# Patient Record
Sex: FEMALE | Race: WHITE | Employment: OTHER | URBAN - METROPOLITAN AREA
[De-identification: names, ages, dates, MRNs, and addresses within clinical notes are randomized per-mention and may not be internally consistent; named-entity substitution may affect disease eponyms.]

---

## 2017-01-12 ENCOUNTER — PATIENT OUTREACH (OUTPATIENT)
Dept: FAMILY MEDICINE CLINIC | Age: 67
End: 2017-01-12

## 2017-01-12 ENCOUNTER — OFFICE VISIT (OUTPATIENT)
Dept: ORTHOPEDIC SURGERY | Facility: CLINIC | Age: 67
End: 2017-01-12

## 2017-01-12 VITALS — DIASTOLIC BLOOD PRESSURE: 86 MMHG | SYSTOLIC BLOOD PRESSURE: 140 MMHG | HEART RATE: 70 BPM | TEMPERATURE: 98 F

## 2017-01-12 DIAGNOSIS — M25.512 LEFT SHOULDER PAIN, UNSPECIFIED CHRONICITY: ICD-10-CM

## 2017-01-12 DIAGNOSIS — M19.012 PRIMARY OSTEOARTHRITIS OF LEFT SHOULDER: ICD-10-CM

## 2017-01-12 DIAGNOSIS — M48.061 LUMBAR SPINAL STENOSIS: Primary | ICD-10-CM

## 2017-01-12 DIAGNOSIS — G89.29 CHRONIC BILATERAL LOW BACK PAIN WITHOUT SCIATICA: ICD-10-CM

## 2017-01-12 DIAGNOSIS — Z98.890 S/P ROTATOR CUFF REPAIR: ICD-10-CM

## 2017-01-12 DIAGNOSIS — M75.52 SHOULDER BURSITIS, LEFT: ICD-10-CM

## 2017-01-12 DIAGNOSIS — M25.552 LEFT HIP PAIN: ICD-10-CM

## 2017-01-12 DIAGNOSIS — Z98.1 S/P LUMBAR FUSION: ICD-10-CM

## 2017-01-12 DIAGNOSIS — M54.50 CHRONIC BILATERAL LOW BACK PAIN WITHOUT SCIATICA: ICD-10-CM

## 2017-01-12 RX ORDER — BUPIVACAINE HYDROCHLORIDE 2.5 MG/ML
4 INJECTION, SOLUTION EPIDURAL; INFILTRATION; INTRACAUDAL ONCE
Qty: 4 ML | Refills: 0
Start: 2017-01-12 | End: 2017-01-12

## 2017-01-12 RX ORDER — BETAMETHASONE SODIUM PHOSPHATE AND BETAMETHASONE ACETATE 3; 3 MG/ML; MG/ML
3 INJECTION, SUSPENSION INTRA-ARTICULAR; INTRALESIONAL; INTRAMUSCULAR; SOFT TISSUE ONCE
Qty: 0.5 ML | Refills: 0
Start: 2017-01-12 | End: 2017-01-12
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
## 2017-01-12 NOTE — PROGRESS NOTES
Patient call for status update. Mrs. Livia Carter states that she is still experiencing a lot of back and hip pain. She is scheduled to see an orthopedic physician today at 4:00 PM.  She is not yet able to exercise as she wishes and is using her walker to get around the house. Mrs. Livia Carter states that she would like to increase her mobility so that she can spend quality time with her grandchildren. The patient states that her blood sugars have been high over the past few weeks but are \"coming down\". She states her FBS this morning was 240 which she recognizes as still too high, but she feels that medications recently completed (prednisone) and her stress level due to chronic pain has kept them high. She states she is taking all of her medication as prescribed other than the Ellipta inhaler which insurance would not cover. Mrs. Livia Carter says that her breathing is fine. She is using her breathing medications and does not feel unusually short of breath. The patient asked about a referral for bariatric surgery that she discussed with her PCP at her visit on 12/30/2015. The referral is in place so I recommended she call the surgeon's office if she does not hear from them within another few days. She has had surgery with Dr. Natasha Morales in the past and I informed her that Dr. Natasha Morales does bariatric surgery so she will call his office to schedule an appointment. Mrs. Livia Carter agreed to another call in a few weeks for an update.

## 2017-01-12 NOTE — PATIENT INSTRUCTIONS
Learning About How to Have a Healthy Back  What causes back pain? Back pain is often caused by overuse, strain, or injury. For example, people often hurt their backs playing sports or working in the yard, being jolted in a car accident, or lifting something too heavy. Aging plays a part too. Your bones and muscles tend to lose strength as you age, which makes injury more likely. The spongy discs between the bones of the spine (vertebrae) may suffer from wear and tear and no longer provide enough cushion between the bones. A disc that bulges or breaks open (herniated disc) can press on nerves, causing back pain. In some people, back pain is the result of arthritis, broken vertebrae caused by bone loss (osteoporosis), illness, or a spine problem. Although most people have back pain at one time or another, there are steps you can take to make it less likely. How can you have a healthy back? Reduce stress on your back through good posture  Slumping or slouching alone may not cause low back pain. But after the back has been strained or injured, bad posture can make pain worse. · Sleep in a position that maintains your back's normal curves and on a mattress that feels comfortable. Sleep on your side with a pillow between your knees, or sleep on your back with a pillow under your knees. These positions can reduce strain on your back. · Stand and sit up straight. \"Good posture\" generally means your ears, shoulders, and hips are in a straight line. · If you must stand for a long time, put one foot on a stool, ledge, or box. Switch feet every now and then. · Sit in a chair that is low enough to let you place both feet flat on the floor with both knees nearly level with your hips. If your chair or desk is too high, use a footrest to raise your knees. Place a small pillow, a rolled-up towel, or a lumbar roll in the curve of your back if you need extra support.   · Try a kneeling chair, which helps tilt your hips forward. This takes pressure off your lower back. · Try sitting on an exercise ball. It can rock from side to side, which helps keep your back loose. · When driving, keep your knees nearly level with your hips. Sit straight, and drive with both hands on the steering wheel. Your arms should be in a slightly bent position. Reduce stress on your back through careful lifting  · Squat down, bending at the hips and knees only. If you need to, put one knee to the floor and extend your other knee in front of you, bent at a right angle (half kneeling). · Press your chest straight forward. This helps keep your upper back straight while keeping a slight arch in your low back. · Hold the load as close to your body as possible, at the level of your belly button (navel). · Use your feet to change direction, taking small steps. · Lead with your hips as you change direction. Keep your shoulders in line with your hips as you move. · Set down your load carefully, squatting with your knees and hips only. Exercise and stretch your back  · Do some exercise on most days of the week, if your doctor says it is okay. You can walk, run, swim, or cycle. · Stretch your back muscles. Here are a few exercises to try:  Josephus Phlegm on your back, and gently pull one bent knee to your chest. Put that foot back on the floor, and then pull the other knee to your chest.  ¨ Do pelvic tilts. Lie on your back with your knees bent. Tighten your stomach muscles. Pull your belly button (navel) in and up toward your ribs. You should feel like your back is pressing to the floor and your hips and pelvis are slightly lifting off the floor. Hold for 6 seconds while breathing smoothly. ¨ Sit with your back flat against a wall. · Keep your core muscles strong. The muscles of your back, belly (abdomen), and buttocks support your spine. ¨ Pull in your belly and imagine pulling your navel toward your spine. Hold this for 6 seconds, then relax.  Remember to keep breathing normally as you tense your muscles. ¨ Do curl-ups. Always do them with your knees bent. Keep your low back on the floor, and curl your shoulders toward your knees using a smooth, slow motion. Keep your arms folded across your chest. If this bothers your neck, try putting your hands behind your neck (not your head), with your elbows spread apart. ¨ Lie on your back with your knees bent and your feet flat on the floor. Tighten your belly muscles, and then push with your feet and raise your buttocks up a few inches. Hold this position 6 seconds as you continue to breathe normally, then lower yourself slowly to the floor. Repeat 8 to 12 times. ¨ If you like group exercise, try Pilates or yoga. These classes have poses that strengthen the core muscles. Lead a healthy lifestyle  · Stay at a healthy weight to avoid strain on your back. · Do not smoke. Smoking increases the risk of osteoporosis, which weakens the spine. If you need help quitting, talk to your doctor about stop-smoking programs and medicines. These can increase your chances of quitting for good. Where can you learn more? Go to http://flores-kendell.info/. Enter L315 in the search box to learn more about \"Learning About How to Have a Healthy Back. \"  Current as of: May 23, 2016  Content Version: 11.1  © 7129-3423 [employer]. Care instructions adapted under license by [employer] (which disclaims liability or warranty for this information). If you have questions about a medical condition or this instruction, always ask your healthcare professional. [place] [address] any warranty or liability for your use of this information.

## 2017-01-16 ENCOUNTER — OFFICE VISIT (OUTPATIENT)
Dept: ORTHOPEDIC SURGERY | Age: 67
End: 2017-01-16

## 2017-01-16 VITALS
OXYGEN SATURATION: 97 % | HEIGHT: 61 IN | HEART RATE: 88 BPM | SYSTOLIC BLOOD PRESSURE: 137 MMHG | RESPIRATION RATE: 16 BRPM | BODY MASS INDEX: 32.1 KG/M2 | DIASTOLIC BLOOD PRESSURE: 76 MMHG | TEMPERATURE: 97.7 F | WEIGHT: 170 LBS

## 2017-01-16 DIAGNOSIS — Z98.1 S/P LUMBAR FUSION: Primary | ICD-10-CM

## 2017-01-16 DIAGNOSIS — M48.061 LUMBAR SPINAL STENOSIS: ICD-10-CM

## 2017-01-16 RX ORDER — HYDROCODONE BITARTRATE AND ACETAMINOPHEN 5; 325 MG/1; MG/1
1 TABLET ORAL
Qty: 60 TAB | Refills: 0 | Status: SHIPPED | OUTPATIENT
Start: 2017-01-16 | End: 2017-01-30 | Stop reason: ALTCHOICE
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
## 2017-01-16 NOTE — PATIENT INSTRUCTIONS

## 2017-01-20 ENCOUNTER — HOSPITAL ENCOUNTER (OUTPATIENT)
Dept: MRI IMAGING | Age: 67
Discharge: HOME | End: 2017-01-20
Attending: PHYSICAL MEDICINE & REHABILITATION
Payer: MEDICARE

## 2017-01-20 ENCOUNTER — DOCUMENTATION ONLY (OUTPATIENT)
Dept: ORTHOPEDIC SURGERY | Age: 67
End: 2017-01-20

## 2017-01-20 VITALS — BODY MASS INDEX: 32.88 KG/M2 | WEIGHT: 174 LBS

## 2017-01-20 DIAGNOSIS — M48.061 LUMBAR SPINAL STENOSIS: ICD-10-CM

## 2017-01-20 DIAGNOSIS — Z98.1 S/P LUMBAR FUSION: ICD-10-CM

## 2017-01-20 PROCEDURE — 74011250636 HC RX REV CODE- 250/636: Performed by: PHYSICAL MEDICINE & REHABILITATION

## 2017-01-20 PROCEDURE — 72158 MRI LUMBAR SPINE W/O & W/DYE: CPT

## 2017-01-20 PROCEDURE — A9585 GADOBUTROL INJECTION: HCPCS | Performed by: PHYSICAL MEDICINE & REHABILITATION

## 2017-01-20 RX ADMIN — GADOBUTROL 8 ML: 604.72 INJECTION INTRAVENOUS at 09:04

## 2017-01-20 NOTE — PROGRESS NOTES
Why does pt have an appt with me and Dr Mary Mullins in the same month? She needs to be seen for an MRI f/u so that needs to be done with Dr Mary Mullins. If not needed. Cancel the appt with me.

## 2017-01-24 ENCOUNTER — DOCUMENTATION ONLY (OUTPATIENT)
Dept: ORTHOPEDIC SURGERY | Age: 67
End: 2017-01-24

## 2017-01-25 ENCOUNTER — OFFICE VISIT (OUTPATIENT)
Dept: ORTHOPEDIC SURGERY | Age: 67
End: 2017-01-25

## 2017-01-25 VITALS
RESPIRATION RATE: 18 BRPM | WEIGHT: 176 LBS | BODY MASS INDEX: 33.23 KG/M2 | HEART RATE: 83 BPM | SYSTOLIC BLOOD PRESSURE: 132 MMHG | HEIGHT: 61 IN | DIASTOLIC BLOOD PRESSURE: 80 MMHG | TEMPERATURE: 97.9 F | OXYGEN SATURATION: 97 %

## 2017-01-25 DIAGNOSIS — E11.43 TYPE 2 DIABETES MELLITUS WITH AUTONOMIC NEUROPATHY (HCC): ICD-10-CM

## 2017-01-25 DIAGNOSIS — M81.0 OSTEOPOROSIS: ICD-10-CM

## 2017-01-25 DIAGNOSIS — Z98.1 S/P LUMBAR FUSION: ICD-10-CM

## 2017-01-25 DIAGNOSIS — S32.000A LUMBAR COMPRESSION FRACTURE, CLOSED, INITIAL ENCOUNTER (HCC): Primary | ICD-10-CM

## 2017-01-25 RX ORDER — FENTANYL 12.5 UG/1
1 PATCH TRANSDERMAL
Qty: 10 PATCH | Refills: 0 | Status: SHIPPED | OUTPATIENT
Start: 2017-01-25 | End: 2017-01-30 | Stop reason: ALTCHOICE

## 2017-01-25 RX ORDER — FENTANYL 12.5 UG/1
1 PATCH TRANSDERMAL
Qty: 10 PATCH | Refills: 0 | Status: SHIPPED | OUTPATIENT
Start: 2017-02-24 | End: 2017-07-17 | Stop reason: ALTCHOICE

## 2017-01-25 RX ORDER — HYDROCODONE BITARTRATE AND ACETAMINOPHEN 5; 325 MG/1; MG/1
1 TABLET ORAL
Qty: 60 TAB | Refills: 0 | Status: CANCELLED | OUTPATIENT
Start: 2017-02-15

## 2017-01-25 RX ORDER — HYDROCODONE BITARTRATE AND ACETAMINOPHEN 10; 325 MG/1; MG/1
TABLET ORAL
COMMUNITY
Start: 2017-01-08 | End: 2017-01-30 | Stop reason: ALTCHOICE

## 2017-01-25 RX ORDER — METFORMIN HYDROCHLORIDE 1000 MG/1
TABLET ORAL
Qty: 180 TAB | Refills: 0 | Status: SHIPPED | OUTPATIENT
Start: 2017-01-25 | End: 2017-04-14 | Stop reason: SDUPTHER

## 2017-01-25 NOTE — PATIENT INSTRUCTIONS
Compression Fracture of the Spine: Care Instructions  Your Care Instructions    A compression fracture happens when the front part of a spinal bone breaks and collapses. A fall or other accident can cause it. A minor injury or moving the wrong way can cause a break if you have thin or brittle bones (osteoporosis). These types of breaks will heal in 8 to 10 weeks. You will need rest and pain medicines. Your doctor may recommend physical therapy. Some doctors recommend that certain people with compression fractures wear braces. Your doctor also may treat thin or brittle bones. You may need surgery if you have lasting pain or if the bone presses on the spinal cord or nerves. You heal best when you take good care of yourself. Eat a variety of healthy foods, and don't smoke. Follow-up care is a key part of your treatment and safety. Be sure to make and go to all appointments, and call your doctor if you are having problems. It's also a good idea to know your test results and keep a list of the medicines you take. How can you care for yourself at home? · Be safe with medicines. Read and follow all instructions on the label. ¨ If the doctor gave you a prescription medicine for pain, take it as prescribed. ¨ If you are not taking a prescription pain medicine, ask your doctor if you can take an over-the-counter medicine. · Talk to your doctor about how to make your bones stronger. You may need medicines or a change in what you eat. · Be active only as directed by your doctor. When should you call for help? Call 911 anytime you think you may need emergency care. For example, call if:  · You are unable to move an arm or a leg at all. Call your doctor now or seek immediate medical care if:  · You have new or worse symptoms in your arms, chest, legs, belly, or buttocks. Symptoms may include:  ¨ Numbness or tingling. ¨ Weakness. ¨ Pain. · You lose bladder or bowel control.   · You have belly pain, bloating, vomiting, or nausea. Watch closely for changes in your health, and be sure to contact your doctor if:  · You are not getting better as expected. Where can you learn more? Go to http://flores-kendell.info/. Enter P445 in the search box to learn more about \"Compression Fracture of the Spine: Care Instructions. \"  Current as of: August 10, 2016  Content Version: 11.1  © 3827-2918 [employer]. Care instructions adapted under license by [employer] (which disclaims liability or warranty for this information). If you have questions about a medical condition or this instruction, always ask your healthcare professional. [address] any warranty or liability for your use of this information.
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
## 2017-01-25 NOTE — PROGRESS NOTES
WILBUR ENTERED PER DR. To Concepcion AS DOCUMENTED ON BLUE SHEET: REFERRAL TO ENDOCRINOLOGY FOR DR. GRAJEDA FOR EVAL FOR OSTEOPOROSIS, HX OF BREAST CANCER. FAXED [phone]. FAX CONFIRMATION RECEIVED.

## 2017-01-30 ENCOUNTER — PATIENT OUTREACH (OUTPATIENT)
Dept: FAMILY MEDICINE CLINIC | Age: 67
End: 2017-01-30

## 2017-01-30 ENCOUNTER — DOCUMENTATION ONLY (OUTPATIENT)
Dept: ORTHOPEDIC SURGERY | Age: 67
End: 2017-01-30

## 2017-01-30 ENCOUNTER — OFFICE VISIT (OUTPATIENT)
Dept: FAMILY MEDICINE CLINIC | Age: 67
End: 2017-01-30

## 2017-01-30 VITALS
SYSTOLIC BLOOD PRESSURE: 106 MMHG | DIASTOLIC BLOOD PRESSURE: 64 MMHG | HEART RATE: 84 BPM | RESPIRATION RATE: 20 BRPM | HEIGHT: 61 IN | TEMPERATURE: 98 F | OXYGEN SATURATION: 99 % | WEIGHT: 180.4 LBS | BODY MASS INDEX: 34.06 KG/M2

## 2017-01-30 DIAGNOSIS — Z79.4 TYPE 2 DIABETES MELLITUS WITH DIABETIC AUTONOMIC NEUROPATHY, WITH LONG-TERM CURRENT USE OF INSULIN (HCC): Primary | ICD-10-CM

## 2017-01-30 DIAGNOSIS — J40 BRONCHITIS: ICD-10-CM

## 2017-01-30 DIAGNOSIS — E11.43 TYPE 2 DIABETES MELLITUS WITH DIABETIC AUTONOMIC NEUROPATHY, WITH LONG-TERM CURRENT USE OF INSULIN (HCC): Primary | ICD-10-CM

## 2017-01-30 DIAGNOSIS — M81.0 OSTEOPOROSIS: ICD-10-CM

## 2017-01-30 DIAGNOSIS — S32.020B: ICD-10-CM

## 2017-01-30 RX ORDER — LIDOCAINE 50 MG/G
PATCH TOPICAL
Qty: 30 EACH | Refills: 0 | Status: ON HOLD | OUTPATIENT
Start: 2017-01-30 | End: 2017-02-02

## 2017-01-30 RX ORDER — AZITHROMYCIN 250 MG/1
TABLET, FILM COATED ORAL
Qty: 6 TAB | Refills: 0 | Status: SHIPPED | OUTPATIENT
Start: 2017-01-30 | End: 2017-02-04

## 2017-01-30 RX ORDER — ALENDRONATE SODIUM 70 MG/1
70 TABLET ORAL
Qty: 30 TAB | Refills: 0 | Status: SHIPPED | OUTPATIENT
Start: 2017-01-30 | End: 2017-11-13 | Stop reason: SDUPTHER

## 2017-01-30 NOTE — PROGRESS NOTES
Nolan Graff is a 73 y.o. female  Chief Complaint   Patient presents with    COPD     1 month follow up     1. Have you been to the ER, urgent care clinic since your last visit? Hospitalized since your last visit? No    2. Have you seen or consulted any other health care providers outside of the [facility] since your last visit? Include any pap smears or colon screening.  No

## 2017-01-30 NOTE — MR AVS SNAPSHOT
Visit Information Date & Time Provider Department Dept. Phone Encounter #  
 1/30/2017  7:45 AM Kameron Lynn, [facility] [phone] [id number] Your Appointments 3/1/2017  1:50 PM  
Follow Up with Justin Downs MD  
[facility] ([facility]) Appt Note: 1 month back fu  no copay [address] [facility] [id number] [phone]  
  
   
 [address] [facility] [address] Upcoming Health Maintenance Date Due  
 EYE EXAM RETINAL OR DILATED Q1 4/5/2015 GLAUCOMA SCREENING Q2Y 4/7/2016 FOOT EXAM Q1 4/18/2017 MICROALBUMIN Q1 4/18/2017 LIPID PANEL Q1 4/18/2017 HEMOGLOBIN A1C Q6M 6/30/2017 MEDICARE YEARLY EXAM 7/27/2017 BREAST CANCER SCRN MAMMOGRAM 8/11/2018 COLONOSCOPY 10/6/2019 DTaP/Tdap/Td series (2 - Td) 7/26/2026 Allergies as of 1/30/2017  Review Complete On: 1/30/2017 By: Kameron Lynn MD  
  
 Severity Noted Reaction Type Reactions Percocet [Oxycodone-acetaminophen]  10/08/2010    Rash, Itching Can Take Percocet but must take Benadryl for itching Perfume Ht52  02/05/2015    Rash Perfume specific:  MUSK Current Immunizations  Reviewed on 4/18/2016 Name Date Influenza High Dose Vaccine PF 10/19/2016  9:33 AM  
 Influenza Vaccine 10/19/2015  8:47 AM, 10/17/2013 Influenza Vaccine Whole 12/1/2008 Pneumococcal Polysaccharide (PPSV-23) 4/18/2016  1:50 PM  
 Pneumococcal Vaccine (Unspecified Type) 12/1/2008 Not reviewed this visit You Were Diagnosed With   
  
 Codes Comments Type 2 diabetes mellitus with diabetic autonomic neuropathy, with long-term current use of insulin (HCC)    -  Primary ICD-10-CM: E11.43, Z79.4 ICD-9-CM: 250.60, 337.1, V58.67 Compression fracture of L2, open, [facility] encounter [facility]     ICD-10-CM: S32.020B ICD-9-CM: 805.5 Osteoporosis     ICD-10-CM: M81.0 ICD-9-CM: 733.00   
 Bronchitis     ICD-10-CM: J40 ICD-9-CM: 084 Vitals BP Pulse Temp Resp Height(growth percentile) Weight(growth percentile) 106/64 (BP 1 Location: Right arm, BP Patient Position: Sitting) 84 98 °F (36.7 °C) 20 5' 1\" (1.549 m) 180 lb 6.4 oz (81.8 kg) SpO2 BMI OB Status Smoking Status 99% 34.09 kg/m2 Hysterectomy Current Every Day Smoker BMI and BSA Data Body Mass Index Body Surface Area 34.09 kg/m 2 1.88 m 2 Preferred Pharmacy Pharmacy Name Phone [facility] [address] [phone] Your Updated Medication List  
  
   
This list is accurate as of: 1/30/17  8:06 AM.  Always use your most recent med list.  
  
  
  
  
 albuterol 90 mcg/actuation inhaler Commonly known as:  PROVENTIL HFA, VENTOLIN HFA, PROAIR HFA Take 2 Puffs by inhalation every four (4) hours as needed for Wheezing. Indications: Chronic Obstructive Pulmonary Disease  
  
 alendronate 70 mg tablet Commonly known as:  FOSAMAX Take 1 Tab by mouth every seven (7) days. aspirin 81 mg chewable tablet Take 1 Tab by mouth daily. azithromycin 250 mg tablet Commonly known as:  Steffanie Brain Take 2 tablets today, then take 1 tablet daily CALCIUM-MAGNESIUM-ZINC PO Take  by mouth daily. fentaNYL 12 mcg/hr patch Commonly known as:  DURAGESIC  
1 Patch by TransDERmal route every seventy-two (72) hours. Max Daily Amount: 1 Patch. Start taking on:  2/24/2017  
  
 glipiZIDE 10 mg tablet Commonly known as:  Tamiko Limb Take 1 Tab by mouth two (2) times a day. insulin regular 100 unit/mL injection Commonly known as:  NOVOLIN R, HUMULIN R  
15 Units by SubCUTAneous route three (3) times daily. insulin syringe-needle U-100 Dispense ultrafine 0.5 mL syringes with 31 gauge needle  
  
 ipratropium 0.02 % nebulizer solution Commonly known as:  ATROVENT  
 2.5 mL by Nebulization route every four (4) hours (while awake). Indications: PREVENTION OF BRONCHOSPASM WITH CHRONIC BRONCHITIS Iron 325 mg (65 mg iron) tablet Generic drug:  ferrous sulfate Take  by mouth Daily (before breakfast). lidocaine 5 % Commonly known as:  Sheran Alert Apply patch to the affected area for 12 hours a day and remove for 12 hours a day. lisinopril-hydroCHLOROthiazide 20-25 mg per tablet Commonly known as:  Warner Spark Take 1 Tab by mouth daily. metFORMIN 1,000 mg tablet Commonly known as:  GLUCOPHAGE  
TAKE ONE TABLET BY MOUTH TWICE DAILY WITH FOOD  
  
 pravastatin 40 mg tablet Commonly known as:  PRAVACHOL Take 1 Tab by mouth daily. umeclidinium 62.5 mcg/actuation inhaler Commonly known as:  INCRUSE ELLIPTA Take 1 Puff by inhalation daily. Prescriptions Sent to Pharmacy Refills  
 alendronate (FOSAMAX) 70 mg tablet 0 Sig: Take 1 Tab by mouth every seven (7) days. Class: Normal  
 Pharmacy: [facility] [address] [place] [address] Ph #: [phone] Route: Oral  
 lidocaine (LIDODERM) 5 % 0 Sig: Apply patch to the affected area for 12 hours a day and remove for 12 hours a day. Class: Normal  
 Pharmacy: [facility] [address] [place] [address]  Ph #: [phone]  
 azithromycin (ZITHROMAX) 250 mg tablet 0 Sig: Take 2 tablets today, then take 1 tablet daily Class: Normal  
 Pharmacy: [facility] [address] Ph #: [phone] We Performed the Following REFERRAL TO INTERVENTIONAL RADIOLOGY [MIA36 Custom] Comments:  
 Please evaluate patient for L2 compression fracture. eval for kyphoplasty. REFERRAL TO OPHTHALMOLOGY [REF57 Custom] Comments:  
 Please evaluate patient for diabetic eye exam.  Dr. Mortimer Grade eye Referral Information Referral ID Referred By Referred To [id number] 104 Janie Lunsford Not Available Visits Status Start Date End Date 1 New Request 1/30/17 1/30/18 If your referral has a status of pending review or denied, additional information will be sent to support the outcome of this decision. Referral ID Referred By Referred To  
 [id number] Ronnie Gifford V Not Available Visits Status Start Date End Date 1 New Request 1/30/17 1/30/18 If your referral has a status of pending review or denied, additional information will be sent to support the outcome of this decision. Patient Instructions Compression Fracture of the Spine: Care Instructions Your Care Instructions A compression fracture happens when the front part of a spinal bone breaks and collapses. A fall or other accident can cause it. A minor injury or moving the wrong way can cause a break if you have thin or brittle bones (osteoporosis). These types of breaks will heal in 8 to 10 weeks. You will need rest and pain medicines. Your doctor may recommend physical therapy. Some doctors recommend that certain people with compression fractures wear braces. Your doctor also may treat thin or brittle bones. You may need surgery if you have lasting pain or if the bone presses on the spinal cord or nerves. You heal best when you take good care of yourself. Eat a variety of healthy foods, and don't smoke. Follow-up care is a key part of your treatment and safety. Be sure to make and go to all appointments, and call your doctor if you are having problems. It's also a good idea to know your test results and keep a list of the medicines you take. How can you care for yourself at home? · Be safe with medicines. Read and follow all instructions on the label. ¨ If the doctor gave you a prescription medicine for pain, take it as prescribed. ¨ If you are not taking a prescription pain medicine, ask your doctor if you can take an over-the-counter medicine. · Talk to your doctor about how to make your bones stronger. You may need medicines or a change in what you eat. · Be active only as directed by your doctor. When should you call for help? Call 911 anytime you think you may need emergency care. For example, call if: 
· You are unable to move an arm or a leg at all. Call your doctor now or seek immediate medical care if: 
· You have new or worse symptoms in your arms, chest, legs, belly, or buttocks. Symptoms may include: ¨ Numbness or tingling. ¨ Weakness. ¨ Pain. · You lose bladder or bowel control. · You have belly pain, bloating, vomiting, or nausea. Watch closely for changes in your health, and be sure to contact your doctor if: 
· You are not getting better as expected. Where can you learn more? Go to http://flores-kendell.info/. Enter P445 in the search box to learn more about \"Compression Fracture of the Spine: Care Instructions. \" Current as of: August 10, 2016 Content Version: 11.1 © 9255-4422 [employer]. Care instructions adapted under license by [employer] (which disclaims liability or warranty for this information). If you have questions about a medical condition or this instruction, always ask your healthcare professional. [address] any warranty or liability for your use of this information. Introducing [facility]! Dear Lazaro Alcala: 
Thank you for requesting a [employer] account. Our records indicate that you already have an active [employer] account. You can access your account anytime at [email or web address] Did you know that you can access your hospital and ER discharge instructions at any time in [employer]? You can also review all of your test results from your hospital stay or ER visit. Additional Information If you have questions, please visit the Frequently Asked Questions section of the [employer] website at [email or web address] [email or web address]. [email or web address] Remember, [employer] is NOT to be used for urgent needs. For medical emergencies, dial 911. Now available from your iPhone and Android! Please provide this summary of care documentation to your next provider. Your primary care clinician is listed as Norma Emanuel. If you have any questions after today's visit, please call [phone].

## 2017-01-30 NOTE — PROGRESS NOTES
Assessment/Plan:    1. Type 2 diabetes mellitus with diabetic autonomic neuropathy, with long-term current use of insulin ([facility]  -pt didn't bring bs log. Discussed I couldn't titrate meds b/c she hasn't brought bs log. F/u in 1 mo with bs log.  - REFERRAL TO OPHTHALMOLOGY    2. Compression fracture of L2, open, initial encounter ([facility]- will refer to IR for kyphoplasty eval.  - REFERRAL TO INTERVENTIONAL RADIOLOGY  - lidocaine (LIDODERM) 5 %; Apply patch to the affected area for 12 hours a day and remove for 12 hours a day. Dispense: 30 Each; Refill: 0    3. Osteoporosis  -start fosamax. Has referral to endo pending.  - alendronate (FOSAMAX) 70 mg tablet; Take 1 Tab by mouth every seven (7) days. Dispense: 30 Tab; Refill: 0    4. Bronchitis  -pt to get incruse that was previously prescribed. Stop smoking. zpack. - azithromycin (ZITHROMAX) 250 mg tablet; Take 2 tablets today, then take 1 tablet daily  Dispense: 6 Tab; Refill: 0    The plan was discussed with the patient. The patient verbalized understanding and is in agreement with the plan. All medication potential side effects were discussed with the patient.     Health Maintenance:   Health Maintenance   Topic Date Due    EYE EXAM RETINAL OR DILATED Q1  04/05/2015    GLAUCOMA SCREENING Q2Y  04/07/2016    FOOT EXAM Q1  04/18/2017    MICROALBUMIN Q1  04/18/2017    LIPID PANEL Q1  04/18/2017    HEMOGLOBIN A1C Q6M  06/30/2017    MEDICARE YEARLY EXAM  07/27/2017    BREAST CANCER SCRN MAMMOGRAM  08/11/2018    COLONOSCOPY  10/06/2019    DTaP/Tdap/Td series (2 - Td) 07/26/2026    Hepatitis C Screening  Completed    OSTEOPOROSIS SCREENING (DEXA)  Completed    ZOSTER VACCINE AGE 60>  Addressed    Pneumococcal 65+ Low/Medium Risk  Completed    INFLUENZA AGE 9 TO ADULT  Addressed       Iggy Gar is a 73 y.o. female and presents with COPD (1 month follow up)     Subjective:  DM2 - on metformin 1000 units bid, glipizide and regular insulin 15 units TID.  A1c last month was 10.3. She is not checking her bs as requested and didn't bring in her bs log. Hasn't had eye exam.    Notes worsening back pain since 12/2016. MRI this month showed L2 compression fracture. She has known osteoporosis from dexa 9/2016, but never responded to request to start fosamax. She is already taking calcium + D supplement. Pain management started her on a norco pain patch for this. Was treated for bronchitis last month with augmentin and prednisone. States she's still coughing stuff up. She was prescribed spiriva, but insurance didn't cover it so it was changed to incruze. Which she hasn't started. ROS:  Constitutional: No recent weight change. No weakness/fatigue. No f/c. Cardiovascular: No CP/palpitations. No NINA/orthopnea/PND. Respiratory: No cough/sputum, dyspnea, wheezing. Gastointestinal: No dysphagia, reflux. No n/v. No constipation/diarrhea. No melena/rectal bleeding. Neurological: No seizures/numbness/weakness. No paresthesias. The problem list was updated as a part of today's visit.   Patient Active Problem List   Diagnosis Code    Gastroparesis K31.84    Vitamin D deficiency E55.9    Hx of breast cancer Z85.3    Chronic pain syndrome G89.4    Osteoarthrosis, unspecified whether generalized or localized, unspecified site M19.90    Hepatomegaly R83.5    Diastolic congestive heart failure (HCC) I50.30    COPD (chronic obstructive pulmonary disease) (HCC) J44.9    GERD (gastroesophageal reflux disease) K21.9    Rectal bleeding K62.5    Lumbar radiculopathy M54.16    Tobacco dependence F17.200    Chronic radicular lumbar pain M54.16, G89.29    Scoliosis M41.9    Essential hypertension I10    Pure hypercholesterolemia E78.00    Type II diabetes mellitus (HCC) E11.9    CAD (coronary artery disease) I25.10    Spinal stenosis of lumbar region with neurogenic claudication M48.06    Lumbar spinal stenosis M48.06    S/P lumbar fusion Z98.1    Osteoporosis M81.0    Chronic bilateral low back pain without sciatica M54.5, G89.29    Left hip pain M25.552    Left upper arm pain M79.622    Lumbar compression fracture (HCC) S32.000A       The PSH,  were reviewed. SH:  Social History   Substance Use Topics    Smoking status: Current Every Day Smoker     Packs/day: 0.50     Years: 35.00     Types: Cigarettes    Smokeless tobacco: Never Used    Alcohol use No       Medications/Allergies:  Current Outpatient Prescriptions on File Prior to Visit   Medication Sig Dispense Refill    metFORMIN (GLUCOPHAGE) 1,000 mg tablet TAKE ONE TABLET BY MOUTH TWICE DAILY WITH FOOD 180 Tab 0    [START ON 2/24/2017] fentaNYL (DURAGESIC) 12 mcg/hr patch 1 Patch by TransDERmal route every seventy-two (72) hours. Max Daily Amount: 1 Patch. 10 Patch 0    insulin regular (NOVOLIN R, HUMULIN R) 100 unit/mL injection 15 Units by SubCUTAneous route three (3) times daily. 4 Vial 0    ipratropium (ATROVENT) 0.02 % nebulizer solution 2.5 mL by Nebulization route every four (4) hours (while awake). Indications: PREVENTION OF BRONCHOSPASM WITH CHRONIC BRONCHITIS 60 mL 1    albuterol (PROVENTIL HFA, VENTOLIN HFA, PROAIR HFA) 90 mcg/actuation inhaler Take 2 Puffs by inhalation every four (4) hours as needed for Wheezing. Indications: Chronic Obstructive Pulmonary Disease 1 Inhaler 1    umeclidinium (INCRUSE ELLIPTA) 62.5 mcg/actuation inhaler Take 1 Puff by inhalation daily. 1 Inhaler 1    lisinopril-hydrochlorothiazide (PRINZIDE, ZESTORETIC) 20-25 mg per tablet Take 1 Tab by mouth daily. 90 Tab 1    pravastatin (PRAVACHOL) 40 mg tablet Take 1 Tab by mouth daily. 90 Tab 1    glipiZIDE (GLUCOTROL) 10 mg tablet Take 1 Tab by mouth two (2) times a day. 180 Tab 0    insulin syringe-needle U-100 Dispense ultrafine 0.5 mL syringes with 31 gauge needle 100 Syringe 11    CALCIUM-MAGNESIUM-ZINC PO Take  by mouth daily.       ferrous sulfate (IRON) 325 mg (65 mg iron) tablet Take  by mouth Daily (before breakfast).  aspirin 81 mg chewable tablet Take 1 Tab by mouth daily.  HYDROcodone-acetaminophen (NORCO)  mg tablet       fentaNYL (DURAGESIC) 12 mcg/hr patch 1 Patch by TransDERmal route every seventy-two (72) hours. Max Daily Amount: 1 Patch. 10 Patch 0    HYDROcodone-acetaminophen (NORCO) 5-325 mg per tablet Take 1 Tab by mouth two (2) times daily as needed for Pain. Max Daily Amount: 2 Tabs. 60 Tab 0    clotrimazole (LOTRIMIN) 1 % topical cream Apply  to affected area two (2) times a day. 15 g 0     No current facility-administered medications on file prior to visit. Allergies   Allergen Reactions    Percocet [Oxycodone-Acetaminophen] Rash and Itching     Can Take Percocet but must take Benadryl for itching     Perfume Ht52 Rash     Perfume specific:  MUSK       Objective:  Visit Vitals    /64 (BP 1 Location: Right arm, BP Patient Position: Sitting)    Pulse 84    Temp 98 °F (36.7 °C)    Resp 20    Ht 5' 1\" (1.549 m)    Wt 180 lb 6.4 oz (81.8 kg)    SpO2 99%    BMI 34.09 kg/m2      Constitutional: Well developed, nourished, no distress, alert, obese habitus   CV: S1, S2.  RRR. No murmurs/rubs. No thrills palpated. No carotid bruits. Intact distal pulses. No edema. Pulm: No abnormalities on inspection. Clear to auscultation bilaterally. No wheezing/rhonchi. Normal effort. GI: Soft, nontender, nondistended. Normal active bowel sounds. Neuro: A/O x 3. No focal motor or sensory deficits. Speech normal.   Psych: Appropriate affect, judgement and insight. Short-term memory intact.        Labwork and Ancillary Studies:    CBC w/Diff  Lab Results   Component Value Date/Time    WBC 9.7 12/27/2016 08:24 AM    HGB 11.7 12/27/2016 08:24 AM    PLATELET 089 51/01/8053 08:24 AM         Basic Metabolic Profile/LFTs  Lab Results   Component Value Date/Time    Sodium 134 12/27/2016 08:24 AM    Potassium 4.4 12/27/2016 08:24 AM    Chloride 101 12/27/2016 08:24 AM    CO2 26 12/27/2016 08:24 AM    Anion gap 12 06/01/2016 11:05 AM    Glucose 264 12/27/2016 08:24 AM    BUN 28 12/27/2016 08:24 AM    Creatinine 1.1 12/27/2016 08:24 AM    BUN/Creatinine ratio 23 06/01/2016 11:05 AM    GFR est AA >60.0 12/27/2016 08:24 AM    GFR est non-AA 53 12/27/2016 08:24 AM    Calcium 8.8 12/27/2016 08:24 AM      Lab Results   Component Value Date/Time    ALT 22 12/27/2016 08:24 AM    AST 14 12/27/2016 08:24 AM    Alk.  phosphatase 138 12/27/2016 08:24 AM    Bilirubin, direct <0.1 04/28/2016 03:42 AM    Bilirubin, total 0.3 12/27/2016 08:24 AM       Cholesterol  Lab Results   Component Value Date/Time    Cholesterol, total 179 04/18/2016 02:02 PM    HDL Cholesterol 43 04/18/2016 02:02 PM    LDL, calculated 102.2 04/18/2016 02:02 PM    Triglyceride 169 04/18/2016 02:02 PM    CHOL/HDL Ratio 4.2 04/18/2016 02:02 PM

## 2017-01-30 NOTE — PROGRESS NOTES
Patient has completed 30 days since [facility] ED visit on 12/27/16 for c/o left flank pain. Ms. Mira Washington has attended visits with both orthopedics and PCP in the past 30 days. This episode is closed.

## 2017-01-30 NOTE — PATIENT INSTRUCTIONS
Compression Fracture of the Spine: Care Instructions  Your Care Instructions    A compression fracture happens when the front part of a spinal bone breaks and collapses. A fall or other accident can cause it. A minor injury or moving the wrong way can cause a break if you have thin or brittle bones (osteoporosis). These types of breaks will heal in 8 to 10 weeks. You will need rest and pain medicines. Your doctor may recommend physical therapy. Some doctors recommend that certain people with compression fractures wear braces. Your doctor also may treat thin or brittle bones. You may need surgery if you have lasting pain or if the bone presses on the spinal cord or nerves. You heal best when you take good care of yourself. Eat a variety of healthy foods, and don't smoke. Follow-up care is a key part of your treatment and safety. Be sure to make and go to all appointments, and call your doctor if you are having problems. It's also a good idea to know your test results and keep a list of the medicines you take. How can you care for yourself at home? · Be safe with medicines. Read and follow all instructions on the label. ¨ If the doctor gave you a prescription medicine for pain, take it as prescribed. ¨ If you are not taking a prescription pain medicine, ask your doctor if you can take an over-the-counter medicine. · Talk to your doctor about how to make your bones stronger. You may need medicines or a change in what you eat. · Be active only as directed by your doctor. When should you call for help? Call 911 anytime you think you may need emergency care. For example, call if:  · You are unable to move an arm or a leg at all. Call your doctor now or seek immediate medical care if:  · You have new or worse symptoms in your arms, chest, legs, belly, or buttocks. Symptoms may include:  ¨ Numbness or tingling. ¨ Weakness. ¨ Pain. · You lose bladder or bowel control.   · You have belly pain, bloating, vomiting, or nausea. Watch closely for changes in your health, and be sure to contact your doctor if:  · You are not getting better as expected. Where can you learn more? Go to http://flores-kendell.info/. Enter P445 in the search box to learn more about \"Compression Fracture of the Spine: Care Instructions. \"  Current as of: August 10, 2016  Content Version: 11.1  © 6893-7736 [employer]. Care instructions adapted under license by [employer] (which disclaims liability or warranty for this information). If you have questions about a medical condition or this instruction, always ask your healthcare professional. [address] any warranty or liability for your use of this information.

## 2017-02-02 ENCOUNTER — HOSPITAL ENCOUNTER (OUTPATIENT)
Dept: CARDIAC CATH/INVASIVE PROCEDURES | Age: 67
Discharge: HOME | End: 2017-02-02
Attending: RADIOLOGY | Admitting: RADIOLOGY
Payer: MEDICARE

## 2017-02-02 VITALS
BODY MASS INDEX: 33.7 KG/M2 | TEMPERATURE: 97.8 F | DIASTOLIC BLOOD PRESSURE: 83 MMHG | OXYGEN SATURATION: 94 % | WEIGHT: 178.5 LBS | HEIGHT: 61 IN | RESPIRATION RATE: 16 BRPM | SYSTOLIC BLOOD PRESSURE: 135 MMHG | HEART RATE: 100 BPM

## 2017-02-02 LAB
ANION GAP BLD CALC-SCNC: 10 MMOL/L (ref 3–18)
BUN SERPL-MCNC: 22 MG/DL (ref 7–18)
BUN/CREAT SERPL: 22 (ref 12–20)
CALCIUM SERPL-MCNC: 8.7 MG/DL (ref 8.5–10.1)
CHLORIDE SERPL-SCNC: 103 MMOL/L (ref 100–108)
CO2 SERPL-SCNC: 24 MMOL/L (ref 21–32)
CREAT SERPL-MCNC: 1 MG/DL (ref 0.6–1.3)
ERYTHROCYTE [DISTWIDTH] IN BLOOD BY AUTOMATED COUNT: 14.8 % (ref 11.6–14.5)
GLUCOSE BLD STRIP.AUTO-MCNC: 123 MG/DL (ref 70–110)
GLUCOSE BLD STRIP.AUTO-MCNC: 154 MG/DL (ref 70–110)
GLUCOSE SERPL-MCNC: 129 MG/DL (ref 74–99)
HCT VFR BLD AUTO: 32.3 % (ref 35–45)
HGB BLD-MCNC: 10.9 G/DL (ref 12–16)
INR PPP: 0.9 (ref 0.8–1.2)
MCH RBC QN AUTO: 29.3 PG (ref 24–34)
MCHC RBC AUTO-ENTMCNC: 33.7 G/DL (ref 31–37)
MCV RBC AUTO: 86.8 FL (ref 74–97)
PLATELET # BLD AUTO: 235 K/UL (ref 135–420)
PMV BLD AUTO: 8.7 FL (ref 9.2–11.8)
POTASSIUM SERPL-SCNC: 4.4 MMOL/L (ref 3.5–5.5)
PROTHROMBIN TIME: 12.3 SEC (ref 11.5–15.2)
RBC # BLD AUTO: 3.72 M/UL (ref 4.2–5.3)
SODIUM SERPL-SCNC: 137 MMOL/L (ref 136–145)
WBC # BLD AUTO: 8.6 K/UL (ref 4.6–13.2)

## 2017-02-02 PROCEDURE — 22514 PERQ VERTEBRAL AUGMENTATION: CPT

## 2017-02-02 PROCEDURE — 74011000250 HC RX REV CODE- 250: Performed by: RADIOLOGY

## 2017-02-02 PROCEDURE — 85027 COMPLETE CBC AUTOMATED: CPT | Performed by: RADIOLOGY

## 2017-02-02 PROCEDURE — 77030029562 MISC ANGIO PROCEDURE

## 2017-02-02 PROCEDURE — 80048 BASIC METABOLIC PNL TOTAL CA: CPT | Performed by: RADIOLOGY

## 2017-02-02 PROCEDURE — 85610 PROTHROMBIN TIME: CPT | Performed by: RADIOLOGY

## 2017-02-02 PROCEDURE — 74011250636 HC RX REV CODE- 250/636: Performed by: RADIOLOGY

## 2017-02-02 PROCEDURE — 82962 GLUCOSE BLOOD TEST: CPT

## 2017-02-02 PROCEDURE — 74011250637 HC RX REV CODE- 250/637: Performed by: RADIOLOGY

## 2017-02-02 PROCEDURE — 74011636320 HC RX REV CODE- 636/320: Performed by: RADIOLOGY

## 2017-02-02 RX ORDER — SODIUM CHLORIDE 0.9 % (FLUSH) 0.9 %
5-10 SYRINGE (ML) INJECTION EVERY 8 HOURS
Status: DISCONTINUED | OUTPATIENT
Start: 2017-02-02 | End: 2017-02-02 | Stop reason: HOSPADM

## 2017-02-02 RX ORDER — BUPIVACAINE HYDROCHLORIDE 2.5 MG/ML
1-20 INJECTION, SOLUTION EPIDURAL; INFILTRATION; INTRACAUDAL
Status: DISCONTINUED | OUTPATIENT
Start: 2017-02-02 | End: 2017-02-02 | Stop reason: HOSPADM

## 2017-02-02 RX ORDER — FENTANYL CITRATE 50 UG/ML
25-100 INJECTION, SOLUTION INTRAMUSCULAR; INTRAVENOUS
Status: DISCONTINUED | OUTPATIENT
Start: 2017-02-02 | End: 2017-02-02 | Stop reason: HOSPADM

## 2017-02-02 RX ORDER — SODIUM CHLORIDE 0.9 % (FLUSH) 0.9 %
5-10 SYRINGE (ML) INJECTION AS NEEDED
Status: DISCONTINUED | OUTPATIENT
Start: 2017-02-02 | End: 2017-02-02 | Stop reason: HOSPADM

## 2017-02-02 RX ORDER — HYDROCODONE BITARTRATE AND ACETAMINOPHEN 5; 325 MG/1; MG/1
1 TABLET ORAL
Status: DISCONTINUED | OUTPATIENT
Start: 2017-02-02 | End: 2017-02-02 | Stop reason: HOSPADM

## 2017-02-02 RX ORDER — MIDAZOLAM HYDROCHLORIDE 1 MG/ML
.5-2 INJECTION, SOLUTION INTRAMUSCULAR; INTRAVENOUS
Status: DISCONTINUED | OUTPATIENT
Start: 2017-02-02 | End: 2017-02-02 | Stop reason: HOSPADM

## 2017-02-02 RX ORDER — SODIUM CHLORIDE 9 MG/ML
50 INJECTION, SOLUTION INTRAVENOUS CONTINUOUS
Status: DISCONTINUED | OUTPATIENT
Start: 2017-02-02 | End: 2017-02-02 | Stop reason: HOSPADM

## 2017-02-02 RX ORDER — ACETAMINOPHEN 325 MG/1
650 TABLET ORAL
Status: DISCONTINUED | OUTPATIENT
Start: 2017-02-02 | End: 2017-02-02 | Stop reason: HOSPADM

## 2017-02-02 RX ORDER — CEFAZOLIN SODIUM 2 G/50ML
2 SOLUTION INTRAVENOUS ONCE
Status: COMPLETED | OUTPATIENT
Start: 2017-02-02 | End: 2017-02-02

## 2017-02-02 RX ADMIN — MIDAZOLAM HYDROCHLORIDE 1 MG: 1 INJECTION, SOLUTION INTRAMUSCULAR; INTRAVENOUS at 09:59

## 2017-02-02 RX ADMIN — FENTANYL CITRATE 25 MCG: 50 INJECTION, SOLUTION INTRAMUSCULAR; INTRAVENOUS at 10:24

## 2017-02-02 RX ADMIN — FENTANYL CITRATE 25 MCG: 50 INJECTION, SOLUTION INTRAMUSCULAR; INTRAVENOUS at 10:12

## 2017-02-02 RX ADMIN — IOPAMIDOL 20 ML: 408 INJECTION, SOLUTION INTRATHECAL at 10:46

## 2017-02-02 RX ADMIN — BUPIVACAINE HYDROCHLORIDE 40 ML: 2.5 INJECTION, SOLUTION EPIDURAL; INFILTRATION; INTRACAUDAL; PERINEURAL at 10:45

## 2017-02-02 RX ADMIN — MIDAZOLAM HYDROCHLORIDE 1 MG: 1 INJECTION, SOLUTION INTRAMUSCULAR; INTRAVENOUS at 10:07

## 2017-02-02 RX ADMIN — FENTANYL CITRATE 25 MCG: 50 INJECTION, SOLUTION INTRAMUSCULAR; INTRAVENOUS at 10:07

## 2017-02-02 RX ADMIN — FENTANYL CITRATE 25 MCG: 50 INJECTION, SOLUTION INTRAMUSCULAR; INTRAVENOUS at 09:59

## 2017-02-02 RX ADMIN — MIDAZOLAM HYDROCHLORIDE 1 MG: 1 INJECTION, SOLUTION INTRAMUSCULAR; INTRAVENOUS at 10:12

## 2017-02-02 RX ADMIN — HYDROCODONE BITARTRATE AND ACETAMINOPHEN 1 TABLET: 5; 325 TABLET ORAL at 14:07

## 2017-02-02 RX ADMIN — CEFAZOLIN SODIUM 2 G: 2 SOLUTION INTRAVENOUS at 09:53

## 2017-02-02 RX ADMIN — FENTANYL CITRATE 25 MCG: 50 INJECTION, SOLUTION INTRAMUSCULAR; INTRAVENOUS at 10:26

## 2017-02-02 RX ADMIN — SODIUM CHLORIDE 50 ML/HR: 900 INJECTION, SOLUTION INTRAVENOUS at 08:00

## 2017-02-02 NOTE — PROCEDURES
Neuroradiology Brief Procedure Note    Interventional Radiologist: Brett Borden    Pre-operative Diagnosis:  Osteoporosis, Acute Compression Fracture L2    Post-operative Diagnosis: Same as pre-operative diagnosis    Procedure(s) Performed:  Kyphoplasty    Anesthesia:  Local and conscious sedation    Findings:  Informed consent. Kyphoplasty performed at L2 level. Please see final dictated report for full details.     Complications: None immediate    Estimated Blood Loss:  Minimal    Specimens: None    Chelsy Flores MD  [facility]  Neuroradiology  2/2/2017

## 2017-02-02 NOTE — IP AVS SNAPSHOT
De Mcintosh 
 
 
 [address] 
[phone] Patient: Jovanna Cid MRN: [id number] BZP:[id number] You are allergic to the following Allergen Reactions Percocet (Oxycodone-Acetaminophen) Rash Itching Can Take Percocet but must take Benadryl for itching Perfume Ht52 Rash Perfume specific:  MUSK Recent Documentation Height Weight BMI OB Status Smoking Status 1.549 m 81 kg 33.73 kg/m2 Hysterectomy Current Every Day Smoker Emergency Contacts Name Discharge Info Relation Home Work Mobile [facility] DISCHARGE CAREGIVER [3] Spouse [3] [phone] About your hospitalization You were admitted on:  February 2, 2017 [facility] You were discharged on:  February 2, 2017 Unit phone number:  [phone] Why you were hospitalized Your primary diagnosis was:  Not on File Providers Seen During Your Hospitalizations Provider Role Specialty Primary office phone Marly Seymour MD Attending Provider Radiology [phone] Your Primary Care Physician (PCP) Primary Care Physician Office Phone Office Fax Neno Smallwood [phone] Follow-up Information None Your Appointments Tuesday February 28, 2017  7:45 AM EST Follow Up with Christy Bower MD  
[address] [place] [address]) [address] [place] [address] [address] [place] [id number] [phone] Wednesday March 01, 2017  1:50 PM EST Follow Up with Cristopher Miller MD  
[facility] [address] [place] [address]  
[phone] Current Discharge Medication List  
  
ASK your doctor about these medications Dose & Instructions Dispensing Information Comments Morning Noon Evening Bedtime  
 albuterol 90 mcg/actuation inhaler Commonly known as:  PROVENTIL HFA, VENTOLIN HFA, PROAIR HFA Your next dose is: Today, Tomorrow Other:  _________ Dose:  2 Puff Take 2 Puffs by inhalation every four (4) hours as needed for Wheezing. Indications: Chronic Obstructive Pulmonary Disease Quantity:  1 Inhaler Refills:  1  
 J44.9  
    
   
   
   
  
 alendronate 70 mg tablet Commonly known as:  FOSAMAX Your next dose is: Today, Tomorrow Other:  _________ Dose:  70 mg Take 1 Tab by mouth every seven (7) days. Quantity:  30 Tab Refills:  0  
     
   
   
   
  
 aspirin 81 mg chewable tablet Your next dose is: Today, Tomorrow Other:  _________ Dose:  81 mg Take 1 Tab by mouth daily. Refills:  0  
     
   
   
   
  
 azithromycin 250 mg tablet Commonly known as:  Unice Messing Your next dose is: Today, Tomorrow Other:  _________ Take 2 tablets today, then take 1 tablet daily Quantity:  6 Tab Refills:  0  
     
   
   
   
  
 CALCIUM-MAGNESIUM-ZINC PO Your next dose is: Today, Tomorrow Other:  _________ Take  by mouth daily. Refills:  0  
     
   
   
   
  
 fentaNYL 12 mcg/hr patch Commonly known as:  Nick Mote Start taking on:  2/24/2017 Your next dose is: Today, Tomorrow Other:  _________ Dose:  1 Patch 1 Patch by TransDERmal route every seventy-two (72) hours. Max Daily Amount: 1 Patch. Quantity:  10 Patch Refills:  0  
     
   
   
   
  
 glipiZIDE 10 mg tablet Commonly known as:  Mikel Wright Your next dose is: Today, Tomorrow Other:  _________ Dose:  10 mg Take 1 Tab by mouth two (2) times a day. Quantity:  180 Tab Refills:  0  
     
   
   
   
  
 insulin regular 100 unit/mL injection Commonly known as:  MICHAELLE Herron  
   
 Your next dose is: Today, Tomorrow Other:  _________ Dose:  15 Units 15 Units by SubCUTAneous route three (3) times daily. Quantity:  4 Vial  
Refills:  0  
     
   
   
   
  
 insulin syringe-needle U-100 Your next dose is: Today, Tomorrow Other:  _________ Dispense ultrafine 0.5 mL syringes with 31 gauge needle Quantity:  100 Syringe Refills:  11  
     
   
   
   
  
 ipratropium 0.02 % nebulizer solution Commonly known as:  ATROVENT Your next dose is: Today, Tomorrow Other:  _________ Dose:  0.5 mg  
2.5 mL by Nebulization route every four (4) hours (while awake). Indications: PREVENTION OF BRONCHOSPASM WITH CHRONIC BRONCHITIS Quantity:  60 mL Refills:  1  
 J44.9 Iron 325 mg (65 mg iron) tablet Generic drug:  ferrous sulfate Your next dose is: Today, Tomorrow Other:  _________ Take  by mouth Daily (before breakfast). Refills:  0  
     
   
   
   
  
 lisinopril-hydroCHLOROthiazide 20-25 mg per tablet Commonly known as:  Denae Schilder Your next dose is: Today, Tomorrow Other:  _________ Dose:  1 Tab Take 1 Tab by mouth daily. Quantity:  90 Tab Refills:  1  
     
   
   
   
  
 metFORMIN 1,000 mg tablet Commonly known as:  GLUCOPHAGE Your next dose is: Today, Tomorrow Other:  _________ TAKE ONE TABLET BY MOUTH TWICE DAILY WITH FOOD Quantity:  180 Tab Refills:  0  
     
   
   
   
  
 pravastatin 40 mg tablet Commonly known as:  PRAVACHOL Your next dose is: Today, Tomorrow Other:  _________ Dose:  40 mg Take 1 Tab by mouth daily. Quantity:  90 Tab Refills:  1  
     
   
   
   
  
 umeclidinium 62.5 mcg/actuation inhaler Commonly known as:  INCRUSE ELLIPTA Your next dose is: Today, Tomorrow Other:  _________ Dose:  1 Puff Take 1 Puff by inhalation daily. Quantity:  1 Inhaler Refills:  1 Discharge Instructions Kyphoplasty: What to Expect at [facility] Your Recovery After kyphoplasty to relieve pain from compression fractures, your back may feel sore where the hollow needle (trocar) went into your back. This should go away in a few days. Most people are able to return to their daily activities within a day after kyphoplasty. This care sheet gives you a general idea about how long it will take for you to recover. But each person recovers at a different pace. Follow the steps below to get better as quickly as possible. How can you care for yourself at home? Activity · Take it easy for the first 24 hours. Rest when you feel tired. Getting enough sleep will help you recover. · For the first day after the procedure, avoid lifting anything that would make you strain. This may include heavy grocery bags and milk containers, a heavy briefcase or backpack, cat litter or dog food bags, a vacuum , or a child. Diet · You can eat your normal diet. If your stomach is upset, try bland, low-fat foods like plain rice, broiled chicken, toast, and yogurt. Medicines · Your doctor will tell you if and when you can restart your medicines. He or she will also give you instructions about taking any new medicines. · If you take blood thinners, such as warfarin (Coumadin), clopidogrel (Plavix), or aspirin, be sure to talk to your doctor. He or she will tell you if and when to start taking those medicines again. Make sure that you understand exactly what your doctor wants you to do. · Be safe with medicines. Take pain medicines exactly as directed. ¨ If the doctor gave you a prescription medicine for pain, take it as prescribed. ¨ If you are not taking a prescription pain medicine, ask your doctor if you can take an over-the-counter medicine. ¨ Do not take two or more pain medicines at the same time unless your doctor told you to. Many pain medicines have acetaminophen, which is Tylenol. Too much acetaminophen (Tylenol) can be harmful. Incision care · You will have a dressing over the cut (incision). A dressing helps the incision heal and protects it. Your doctor will tell you how to take care of this. Ice 
· If you are sore where the needle was inserted, put ice or a cold pack on your back for 10 to 20 minutes at a time. Try to do this every 1 to 2 hours for the next 3 days (when you are awake) or until the swelling goes down. Put a thin cloth between the ice and your skin. Follow-up care is a key part of your treatment and safety. Be sure to make and go to all appointments, and call your doctor if you are having problems. It's also a good idea to know your test results and keep a list of the medicines you take. When should you call for help? Call 911 anytime you think you may need emergency care. For example, call if: 
· You passed out (lost consciousness). · You have severe trouble breathing. · You have sudden chest pain and shortness of breath, or you cough up blood. · You are unable to move a leg at all. Call your doctor now or seek immediate medical care if: 
· You have new or worse symptoms in your legs, belly, or buttocks. Symptoms may include: ¨ Numbness or tingling. ¨ Weakness. ¨ Pain. · You lose bladder or bowel control. · You have signs of infection, such as: 
¨ Increased pain, swelling, warmth, or redness. ¨ Red streaks leading from the incision. ¨ Pus draining from the incision. ¨ Swollen lymph nodes in your neck, armpits, or groin. ¨ A fever. Watch closely for any changes in your health, and be sure to contact your doctor if: 
· You do not get better as expected. Where can you learn more? Go to http://flores-kendell.info/.  
Enter [phone] in the search box to learn more about \"Kyphoplasty: What to Expect at Home. \" Current as of: June 30, 2016 Content Version: 11.1 © 7491-9127 [employer]. Care instructions adapted under license by [employer] (which disclaims liability or warranty for this information). If you have questions about a medical condition or this instruction, always ask your healthcare professional. [address] any warranty or liability for your use of this information. DISCHARGE SUMMARY from Nurse The following personal items are in your possession at time of discharge: 
 
Dental Appliances: Lowers, With patient Home Medications: None Jewelry: With patient Clothing: Pants, Shirt, Jacket/Coat, Footwear Other Valuables: Debi Carlos PATIENT INSTRUCTIONS: 
 
After general anesthesia or intravenous sedation, for 24 hours or while taking prescription Narcotics: · Limit your activities · Do not drive and operate hazardous machinery · Do not make important personal or business decisions · Do  not drink alcoholic beverages · If you have not urinated within 8 hours after discharge, please contact your surgeon on call. Report the following to your surgeon: 
· Excessive pain, swelling, redness or odor of or around the surgical area · Temperature over 100.5 · Nausea and vomiting lasting longer than 4 hours or if unable to take medications · Any signs of decreased circulation or nerve impairment to extremity: change in color, persistent  numbness, tingling, coldness or increase pain · Any questions What to do at Home: These are general instructions for a healthy lifestyle: No smoking/ No tobacco products/ Avoid exposure to second hand smoke Surgeon General's Warning:  Quitting smoking now greatly reduces serious risk to your health. Obesity, smoking, and sedentary lifestyle greatly increases your risk for illness A healthy diet, regular physical exercise & weight monitoring are important for maintaining a healthy lifestyle You may be retaining fluid if you have a history of heart failure or if you experience any of the following symptoms:  Weight gain of 3 pounds or more overnight or 5 pounds in a week, increased swelling in our hands or feet or shortness of breath while lying flat in bed. Please call your doctor as soon as you notice any of these symptoms; do not wait until your next office visit. Recognize signs and symptoms of STROKE: 
 
F-face looks uneven A-arms unable to move or move unevenly S-speech slurred or non-existent T-time-call 911 as soon as signs and symptoms begin-DO NOT go Back to bed or wait to see if you get better-TIME IS BRAIN. Warning Signs of HEART ATTACK Call 911 if you have these symptoms: 
? Chest discomfort. Most heart attacks involve discomfort in the center of the chest that lasts more than a few minutes, or that goes away and comes back. It can feel like uncomfortable pressure, squeezing, fullness, or pain. ? Discomfort in other areas of the upper body. Symptoms can include pain or discomfort in one or both arms, the back, neck, jaw, or stomach. ? Shortness of breath with or without chest discomfort. ? Other signs may include breaking out in a cold sweat, nausea, or lightheadedness. Don't wait more than five minutes to call [address]! Fast action can save your life. Calling 911 is almost always the fastest way to get lifesaving treatment. Emergency Medical Services staff can begin treatment when they arrive  up to an hour sooner than if someone gets to the hospital by car. The discharge information has been reviewed with the patient. The patient verbalized understanding. Discharge medications reviewed with the patient and appropriate educational materials and side effects teaching were provided. Patient armband removed and given to patient to take home.   Patient was informed of the privacy risks if armband lost or stolen Discharge Orders None Introducing [facility]! Dear Susan Du: 
Thank you for requesting a [employer] account. Our records indicate that you already have an active [employer] account. You can access your account anytime at [email or web address] Did you know that you can access your hospital and ER discharge instructions at any time in [employer]? You can also review all of your test results from your hospital stay or ER visit. Additional Information If you have questions, please visit the Frequently Asked Questions section of the [employer] website at [email or web address] Remember, [employer] is NOT to be used for urgent needs. For medical emergencies, dial 911. Now available from your iPhone and Android! General Information Please provide this summary of care documentation to your next provider. Patient Signature:  ____________________________________________________________ Date:  ____________________________________________________________  
  
[employer] Provider Signature:  ____________________________________________________________ Date:  ____________________________________________________________

## 2017-02-02 NOTE — ROUTINE PROCESS
TRANSFER - OUT REPORT:    Verbal report given to [address] [place] [address] on Yung Crespo  being transferred to [facility] for routine progression of care       Report consisted of patients Situation, Background, Assessment and   Recommendations(SBAR). Information from the following report(s) SBAR was reviewed with the receiving nurse. Lines:   Peripheral IV 05/01/16 Right Arm (Active)       Peripheral IV 02/02/17 Right Arm (Active)   Site Assessment Clean, dry, & intact 2/2/2017  7:00 AM   Phlebitis Assessment 0 2/2/2017  7:00 AM   Infiltration Assessment 0 2/2/2017  7:00 AM   Dressing Status Clean, dry, & intact 2/2/2017  7:00 AM   Dressing Type Transparent;Tape 2/2/2017  7:00 AM   Hub Color/Line Status Pink; Infusing 2/2/2017  7:00 AM        Opportunity for questions and clarification was provided.       Patient transported with:   [employer]

## 2017-02-02 NOTE — DISCHARGE INSTRUCTIONS
Kyphoplasty: What to Expect at [address] [place] [address]  After kyphoplasty to relieve pain from compression fractures, your back may feel sore where the hollow needle (trocar) went into your back. This should go away in a few days. Most people are able to return to their daily activities within a day after kyphoplasty. This care sheet gives you a general idea about how long it will take for you to recover. But each person recovers at a different pace. Follow the steps below to get better as quickly as possible. How can you care for yourself at home? Activity  · Take it easy for the first 24 hours. Rest when you feel tired. Getting enough sleep will help you recover. · For the first day after the procedure, avoid lifting anything that would make you strain. This may include heavy grocery bags and milk containers, a heavy briefcase or backpack, cat litter or dog food bags, a vacuum , or a child. Diet  · You can eat your normal diet. If your stomach is upset, try bland, low-fat foods like plain rice, broiled chicken, toast, and yogurt. Medicines  · Your doctor will tell you if and when you can restart your medicines. He or she will also give you instructions about taking any new medicines. · If you take blood thinners, such as warfarin (Coumadin), clopidogrel (Plavix), or aspirin, be sure to talk to your doctor. He or she will tell you if and when to start taking those medicines again. Make sure that you understand exactly what your doctor wants you to do. · Be safe with medicines. Take pain medicines exactly as directed. ¨ If the doctor gave you a prescription medicine for pain, take it as prescribed. ¨ If you are not taking a prescription pain medicine, ask your doctor if you can take an over-the-counter medicine. ¨ Do not take two or more pain medicines at the same time unless your doctor told you to. Many pain medicines have acetaminophen, which is Tylenol.  Too much acetaminophen (Tylenol) can be harmful. Incision care  · You will have a dressing over the cut (incision). A dressing helps the incision heal and protects it. Your doctor will tell you how to take care of this. Ice  · If you are sore where the needle was inserted, put ice or a cold pack on your back for 10 to 20 minutes at a time. Try to do this every 1 to 2 hours for the next 3 days (when you are awake) or until the swelling goes down. Put a thin cloth between the ice and your skin. Follow-up care is a key part of your treatment and safety. Be sure to make and go to all appointments, and call your doctor if you are having problems. It's also a good idea to know your test results and keep a list of the medicines you take. When should you call for help? Call 911 anytime you think you may need emergency care. For example, call if:  · You passed out (lost consciousness). · You have severe trouble breathing. · You have sudden chest pain and shortness of breath, or you cough up blood. · You are unable to move a leg at all. Call your doctor now or seek immediate medical care if:  · You have new or worse symptoms in your legs, belly, or buttocks. Symptoms may include:  ¨ Numbness or tingling. ¨ Weakness. ¨ Pain. · You lose bladder or bowel control. · You have signs of infection, such as:  ¨ Increased pain, swelling, warmth, or redness. ¨ Red streaks leading from the incision. ¨ Pus draining from the incision. ¨ Swollen lymph nodes in your neck, armpits, or groin. ¨ A fever. Watch closely for any changes in your health, and be sure to contact your doctor if:  · You do not get better as expected. Where can you learn more? Go to http://flores-kendell.info/. Enter [phone] in the search box to learn more about \"Kyphoplasty: What to Expect at Home. \"  Current as of: June 30, 2016  Content Version: 11.1  © 3580-5007 [employer].  Care instructions adapted under license by [employer] (which disclaims liability or warranty for this information). If you have questions about a medical condition or this instruction, always ask your healthcare professional. [place] [address] any warranty or liability for your use of this information. DISCHARGE SUMMARY from Nurse    The following personal items are in your possession at time of discharge:    Dental Appliances: Lowers, With patient        Home Medications: None  Jewelry: With patient  Clothing: Pants, Shirt, Jacket/Coat, Footwear  Other Valuables: Cane, Eyeglasses             PATIENT INSTRUCTIONS:    After general anesthesia or intravenous sedation, for 24 hours or while taking prescription Narcotics:  · Limit your activities  · Do not drive and operate hazardous machinery  · Do not make important personal or business decisions  · Do  not drink alcoholic beverages  · If you have not urinated within 8 hours after discharge, please contact your surgeon on call. Report the following to your surgeon:  · Excessive pain, swelling, redness or odor of or around the surgical area  · Temperature over 100.5  · Nausea and vomiting lasting longer than 4 hours or if unable to take medications  · Any signs of decreased circulation or nerve impairment to extremity: change in color, persistent  numbness, tingling, coldness or increase pain  · Any questions        What to do at Home:  These are general instructions for a healthy lifestyle:    No smoking/ No tobacco products/ Avoid exposure to second hand smoke    Surgeon General's Warning:  Quitting smoking now greatly reduces serious risk to your health.     Obesity, smoking, and sedentary lifestyle greatly increases your risk for illness    A healthy diet, regular physical exercise & weight monitoring are important for maintaining a healthy lifestyle    You may be retaining fluid if you have a history of heart failure or if you experience any of the following symptoms:  Weight gain of 3 pounds or more overnight or 5 pounds in a week, increased swelling in our hands or feet or shortness of breath while lying flat in bed. Please call your doctor as soon as you notice any of these symptoms; do not wait until your next office visit. Recognize signs and symptoms of STROKE:    F-face looks uneven    A-arms unable to move or move unevenly    S-speech slurred or non-existent    T-time-call 911 as soon as signs and symptoms begin-DO NOT go       Back to bed or wait to see if you get better-TIME IS BRAIN. Warning Signs of HEART ATTACK     Call 911 if you have these symptoms:   Chest discomfort. Most heart attacks involve discomfort in the center of the chest that lasts more than a few minutes, or that goes away and comes back. It can feel like uncomfortable pressure, squeezing, fullness, or pain.  Discomfort in other areas of the upper body. Symptoms can include pain or discomfort in one or both arms, the back, neck, jaw, or stomach.  Shortness of breath with or without chest discomfort.  Other signs may include breaking out in a cold sweat, nausea, or lightheadedness. Don't wait more than five minutes to call 911 - MINUTES MATTER! Fast action can save your life. Calling 911 is almost always the fastest way to get lifesaving treatment. Emergency Medical Services staff can begin treatment when they arrive -- up to an hour sooner than if someone gets to the hospital by car. The discharge information has been reviewed with the patient. The patient verbalized understanding. Discharge medications reviewed with the patient and appropriate educational materials and side effects teaching were provided. Patient armband removed and given to patient to take home.   Patient was informed of the privacy risks if armband lost or stolen
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
## 2017-02-03 ENCOUNTER — PATIENT OUTREACH (OUTPATIENT)
Dept: FAMILY MEDICINE CLINIC | Age: 67
End: 2017-02-03

## 2017-02-03 ENCOUNTER — TELEPHONE (OUTPATIENT)
Dept: FAMILY MEDICINE CLINIC | Age: 67
End: 2017-02-03

## 2017-02-03 NOTE — TELEPHONE ENCOUNTER
Pt called to inform Dr. Levert Opitz that she had the Kyphoplasty procedure done yesterday and her blood sugars are doing good.

## 2017-02-17 ENCOUNTER — PATIENT OUTREACH (OUTPATIENT)
Dept: FAMILY MEDICINE CLINIC | Age: 67
End: 2017-02-17

## 2017-02-17 NOTE — PROGRESS NOTES
Contacted patient to follow up kyphoplasty completed at [facility] on 2/2/17. Mrs. South Dickson states she is still experiencing a lot of back pain although it has changed since prior to the procedure. She states that is was excruciating- to the point where she could not walk, and now is still very painful but not near the pre procedure level. She states that using the fentanyl patch helps control the pain. I advised that she call her surgeon to let her know what she is experiencing. She states she will see Dr. Ever Leong on 3/1/17 and will tell her then. Mrs. South Dickson states that she is coughing a lot and it has been going on for about two months. I advised that she call [facility] to schedule a visit to be seen for the cough. Her primary care provider does not have any appointments available early next week and she will need to be seen by another provider. She agreed to call and schedule an appointment. She is also scheduled for a follow up on 2/28/2017 with Dr. Libertad Carcamo.

## 2017-02-18 PROBLEM — I63.9 CVA (CEREBRAL VASCULAR ACCIDENT) (HCC): Status: ACTIVE | Noted: 2017-02-18

## 2017-02-18 PROBLEM — H53.47 HEMIANOPSIA: Status: ACTIVE | Noted: 2017-02-18

## 2017-02-18 PROBLEM — I63.9 OCCIPITAL STROKE (HCC): Status: ACTIVE | Noted: 2017-02-18

## 2017-02-22 ENCOUNTER — PATIENT OUTREACH (OUTPATIENT)
Dept: FAMILY MEDICINE CLINIC | Age: 67
End: 2017-02-22
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
## 2017-02-28 ENCOUNTER — OFFICE VISIT (OUTPATIENT)
Dept: FAMILY MEDICINE CLINIC | Age: 67
End: 2017-02-28

## 2017-02-28 VITALS
HEIGHT: 61 IN | BODY MASS INDEX: 33.3 KG/M2 | OXYGEN SATURATION: 97 % | DIASTOLIC BLOOD PRESSURE: 80 MMHG | TEMPERATURE: 97.6 F | SYSTOLIC BLOOD PRESSURE: 138 MMHG | RESPIRATION RATE: 20 BRPM | WEIGHT: 176.4 LBS | HEART RATE: 106 BPM

## 2017-02-28 DIAGNOSIS — F32.9 REACTIVE DEPRESSION: ICD-10-CM

## 2017-02-28 DIAGNOSIS — E11.43 TYPE 2 DIABETES MELLITUS WITH DIABETIC AUTONOMIC NEUROPATHY, WITH LONG-TERM CURRENT USE OF INSULIN (HCC): ICD-10-CM

## 2017-02-28 DIAGNOSIS — I63.9 OCCIPITAL STROKE (HCC): ICD-10-CM

## 2017-02-28 DIAGNOSIS — Z09 HOSPITAL DISCHARGE FOLLOW-UP: Primary | ICD-10-CM

## 2017-02-28 DIAGNOSIS — Z79.4 TYPE 2 DIABETES MELLITUS WITH DIABETIC AUTONOMIC NEUROPATHY, WITH LONG-TERM CURRENT USE OF INSULIN (HCC): ICD-10-CM

## 2017-02-28 DIAGNOSIS — H65.93 MIDDLE EAR EFFUSION, BILATERAL: ICD-10-CM

## 2017-02-28 NOTE — PROGRESS NOTES
Assessment/Plan:    1. Hospital discharge follow-up    2. Occipital stroke (HCC)  -cont plavix, statin. 3. Type 2 diabetes mellitus with diabetic autonomic neuropathy, with long-term current use of insulin (HCC)  -bs reported appear to be controlled. F/u in 1 mo for a1c.    4. Middle ear effusion, bilateral  -start flonase. 5. Depression  -reactive. Monitor over next few months. If persists, will consider wellbutrin. The plan was discussed with the patient. The patient verbalized understanding and is in agreement with the plan. All medication potential side effects were discussed with the patient. Health Maintenance:   Health Maintenance   Topic Date Due    EYE EXAM RETINAL OR DILATED Q1  04/05/2015    GLAUCOMA SCREENING Q2Y  04/07/2016    FOOT EXAM Q1  04/18/2017    MICROALBUMIN Q1  04/18/2017    MEDICARE YEARLY EXAM  07/27/2017    HEMOGLOBIN A1C Q6M  08/19/2017    LIPID PANEL Q1  02/19/2018    BREAST CANCER SCRN MAMMOGRAM  08/11/2018    COLONOSCOPY  10/06/2019    DTaP/Tdap/Td series (2 - Td) 07/26/2026    Hepatitis C Screening  Completed    OSTEOPOROSIS SCREENING (DEXA)  Completed    ZOSTER VACCINE AGE 60>  Addressed    Pneumococcal 65+ Low/Medium Risk  Completed    INFLUENZA AGE 9 TO ADULT  Addressed       Delfina Mckoy is a 73 y.o. female and presents with Hospital Follow Up     Subjective:  Ms. Miranda Welsh is a 73y.o. year old female, she is seen today for Transition of Care services following a hospital discharge for occipital stroke on 2/20  Our office Nurse Navigator performed an outreach to Ms. Raul Almonte on 2/22 (within 2 business days of discharge) to complete medication reconciliation and a telephonic assessment of her condition. She was started on plavix for occipital stroke causing R sided homonymous hemianopsia. She hasn't had any improvement in her visual field. She stopped smoking during the hospitalization. Notes worsening depression sx since the stroke. States she's been unable to work b/c she needs work note. She used to be on prozac in the past.     DM2 - uncontrolled. She hadn't been monitoring bs regularly at last visit and was instructed to bring in bs log to next visit. She again failed to bring in her bs log. She states her bs in am is 140. States her pre-lunch and dinner bs are in 140s. She is following a diabetic diet and compliant with meds. Notes ear fullness x several weeks. States she can't hear well. Has popping in her ears. ROS:  Constitutional: No recent weight change. No weakness/fatigue. No f/c. HENT: No HA, dizziness. No hearing loss/tinnitus. No nasal congestion/discharge. Eyes: + change in vision,no  double/blurred vision or eye pain/redness. Cardiovascular: No CP/palpitations. No NINA/orthopnea/PND. Respiratory: No cough/sputum, dyspnea, wheezing. Gastointestinal: No dysphagia, reflux. No n/v. No constipation/diarrhea. No melena/rectal bleeding. Genitourinary: No dysuria, urinary hesitancy, nocturia, hematuria. No incontinence. Psychiatric:  + depression, no anxiety. The problem list was updated as a part of today's visit.   Patient Active Problem List   Diagnosis Code    Gastroparesis K31.84    Vitamin D deficiency E55.9    Hx of breast cancer Z85.3    Chronic pain syndrome G89.4    Osteoarthrosis, unspecified whether generalized or localized, unspecified site J64.19    Diastolic congestive heart failure (HCC) I50.30    COPD (chronic obstructive pulmonary disease) (HCC) J44.9    GERD (gastroesophageal reflux disease) K21.9    Tobacco dependence F17.200    Chronic radicular lumbar pain M54.16, G89.29    Scoliosis M41.9    Essential hypertension I10    Pure hypercholesterolemia E78.00    Type II diabetes mellitus (HCC) E11.9    CAD (coronary artery disease) I25.10    Spinal stenosis of lumbar region with neurogenic claudication M48.06    Lumbar spinal stenosis M48.06    S/P lumbar fusion Z98.1    Osteoporosis M81.0    Left hip pain M25.552    Lumbar compression fracture (HCC) S32.000A    Hemianopsia H53.47    Occipital stroke (HCC) I63.9       The PSH, FH were reviewed. SH:  Social History   Substance Use Topics    Smoking status: Former Smoker     Packs/day: 0.50     Years: 35.00     Types: Cigarettes     Quit date: 2/20/2017    Smokeless tobacco: Never Used    Alcohol use No       Medications/Allergies:  Current Outpatient Prescriptions on File Prior to Visit   Medication Sig Dispense Refill    clopidogrel (PLAVIX) 75 mg tab Take 1 Tab by mouth daily. 30 Tab 0    cyanocobalamin (VITAMIN B12) 2,500 mcg sublingual tablet Take 1 Tab by mouth daily. 100 Tab 3    ergocalciferol (ERGOCALCIFEROL) 50,000 unit capsule Take 1 Cap by mouth every Sunday. 4 Cap 3    HYDROcodone-acetaminophen (NORCO) 5-325 mg per tablet Take 1 Tab by mouth every eight (8) hours as needed. Max Daily Amount: 3 Tabs. 20 Tab 0    alendronate (FOSAMAX) 70 mg tablet Take 1 Tab by mouth every seven (7) days. 30 Tab 0    metFORMIN (GLUCOPHAGE) 1,000 mg tablet TAKE ONE TABLET BY MOUTH TWICE DAILY WITH FOOD 180 Tab 0    fentaNYL (DURAGESIC) 12 mcg/hr patch 1 Patch by TransDERmal route every seventy-two (72) hours. Max Daily Amount: 1 Patch. 10 Patch 0    insulin regular (NOVOLIN R, HUMULIN R) 100 unit/mL injection 15 Units by SubCUTAneous route three (3) times daily. 4 Vial 0    ipratropium (ATROVENT) 0.02 % nebulizer solution 2.5 mL by Nebulization route every four (4) hours (while awake). Indications: PREVENTION OF BRONCHOSPASM WITH CHRONIC BRONCHITIS 60 mL 1    albuterol (PROVENTIL HFA, VENTOLIN HFA, PROAIR HFA) 90 mcg/actuation inhaler Take 2 Puffs by inhalation every four (4) hours as needed for Wheezing. Indications: Chronic Obstructive Pulmonary Disease 1 Inhaler 1    umeclidinium (INCRUSE ELLIPTA) 62.5 mcg/actuation inhaler Take 1 Puff by inhalation daily.  1 Inhaler 1    lisinopril-hydrochlorothiazide (PRINZIDE, ZESTORETIC) 20-25 mg per tablet Take 1 Tab by mouth daily. 90 Tab 1    pravastatin (PRAVACHOL) 40 mg tablet Take 1 Tab by mouth daily. 90 Tab 1    glipiZIDE (GLUCOTROL) 10 mg tablet Take 1 Tab by mouth two (2) times a day. 180 Tab 0    insulin syringe-needle U-100 Dispense ultrafine 0.5 mL syringes with 31 gauge needle 100 Syringe 11    CALCIUM-MAGNESIUM-ZINC PO Take  by mouth daily.  ferrous sulfate (IRON) 325 mg (65 mg iron) tablet Take  by mouth Daily (before breakfast).  aspirin 81 mg chewable tablet Take 1 Tab by mouth daily. No current facility-administered medications on file prior to visit. Allergies   Allergen Reactions    Percocet [Oxycodone-Acetaminophen] Rash and Itching     Can Take Percocet but must take Benadryl for itching     Perfume Ht52 Rash     Perfume specific:  MUSK       Objective:  Visit Vitals    /80 (BP 1 Location: Right arm, BP Patient Position: Sitting)    Pulse (!) 106    Temp 97.6 °F (36.4 °C) (Temporal)    Resp 20    Ht 5' 1\" (1.549 m)    Wt 176 lb 6.4 oz (80 kg)    SpO2 97%    BMI 33.33 kg/m2      Constitutional: Well developed, nourished, no distress, alert, obese habitus   HENT: Exterior ears and tympanic membranes normal bilaterally. Supple neck. No thyromegaly or lymphadenopathy. Oropharynx clear and moist mucous membranes. Eyes: Conjunctiva normal. PERRL. CV: S1, S2.  RRR. No murmurs/rubs. No thrills palpated. No carotid bruits. Intact distal pulses. No edema. Pulm: No abnormalities on inspection. Clear to auscultation bilaterally. No wheezing/rhonchi. Normal effort. Neuro: A/O x 3. No focal motor or sensory deficits.  Speech normal.     Labwork and Ancillary Studies:    CBC w/Diff  Lab Results   Component Value Date/Time    WBC 9.3 02/19/2017 05:01 AM    HGB 10.6 02/19/2017 05:01 AM    PLATELET 812 16/69/8034 05:01 AM         Basic Metabolic Profile/LFTs  Lab Results   Component Value Date/Time    Sodium 136 02/20/2017 05:39 AM    Potassium 3.8 02/20/2017 05:39 AM    Chloride 102 02/20/2017 05:39 AM    CO2 24 02/20/2017 05:39 AM    Anion gap 10 02/02/2017 07:58 AM    Glucose 209 02/20/2017 05:39 AM    BUN 20 02/20/2017 05:39 AM    Creatinine 0.9 02/20/2017 05:39 AM    BUN/Creatinine ratio 22 02/02/2017 07:58 AM    GFR est AA >60.0 02/20/2017 05:39 AM    GFR est non-AA >60 02/20/2017 05:39 AM    Calcium 8.3 02/20/2017 05:39 AM      Lab Results   Component Value Date/Time    ALT (SGPT) 22 02/19/2017 05:01 AM    AST (SGOT) 17 02/19/2017 05:01 AM    Alk.  phosphatase 136 02/19/2017 05:01 AM    Bilirubin, direct <0.1 04/28/2016 03:42 AM    Bilirubin, total 0.6 02/19/2017 05:01 AM       Cholesterol  Lab Results   Component Value Date/Time    Cholesterol, total 146 02/19/2017 05:01 AM    HDL Cholesterol 39 02/19/2017 05:01 AM    LDL, calculated 64 02/19/2017 05:01 AM    Triglyceride 216 02/19/2017 05:01 AM    CHOL/HDL Ratio 3.7 02/19/2017 05:01 AM

## 2017-02-28 NOTE — MR AVS SNAPSHOT
Visit Information Date & Time Provider Department Dept. Phone Encounter #  
 2/28/2017  2:35 PM Angelica De MD [address] [place] [phone] [id number] Your Appointments 3/30/2017 10:45 AM  
Follow Up with Venancio Goodwin MD  
[facility] ([facility]) Appt Note: 1 month back fu  no copay; PT CALLED AND RS 03/01/17 FOR THIS NEW DATE. [address] [facility] [address]  
[phone]  
  
   
 [address] [facility] [address] Upcoming Health Maintenance Date Due  
 EYE EXAM RETINAL OR DILATED Q1 4/5/2015 GLAUCOMA SCREENING Q2Y 4/7/2016 FOOT EXAM Q1 4/18/2017 MICROALBUMIN Q1 4/18/2017 MEDICARE YEARLY EXAM 7/27/2017 HEMOGLOBIN A1C Q6M 8/19/2017 LIPID PANEL Q1 2/19/2018 BREAST CANCER SCRN MAMMOGRAM 8/11/2018 COLONOSCOPY 10/6/2019 DTaP/Tdap/Td series (2 - Td) 7/26/2026 Allergies as of 2/28/2017  Review Complete On: 2/28/2017 By: Angelica De MD  
  
 Severity Noted Reaction Type Reactions Percocet [Oxycodone-acetaminophen]  10/08/2010    Rash, Itching Can Take Percocet but must take Benadryl for itching Perfume Ht52  02/05/2015    Rash Perfume specific:  MUSK Current Immunizations  Reviewed on 4/18/2016 Name Date Influenza High Dose Vaccine PF 10/19/2016  9:33 AM  
 Influenza Vaccine 10/19/2015  8:47 AM, 10/17/2013 Influenza Vaccine Whole 12/1/2008 Pneumococcal Polysaccharide (PPSV-23) 4/18/2016  1:50 PM  
 Pneumococcal Vaccine (Unspecified Type) 12/1/2008 Not reviewed this visit You Were Diagnosed With   
  
 Codes Comments Hospital discharge follow-up    -  Primary ICD-10-CM: [address] ICD-9-CM: V67.59 Occipital stroke ([facility] [address]     ICD-10-CM: I63.9 ICD-9-CM: 434.91 Type 2 diabetes mellitus with diabetic autonomic neuropathy, with long-term current use of insulin (HCC)     ICD-10-CM: E11.43, Z79.4 ICD-9-CM: 250.60, 337.1, V58.67 Middle ear effusion, bilateral     ICD-10-CM: H65.93 
ICD-9-CM: 381.4 Reactive depression     ICD-10-CM: F32.9 ICD-9-CM: 300.4 Vitals BP  
  
  
  
  
  
 138/80 (BP 1 Location: Right arm, BP Patient Position: Sitting) BMI and BSA Data Body Mass Index Body Surface Area  
 33.33 kg/m 2 1.86 m 2 Preferred Pharmacy Pharmacy Name Phone [facility]  [phone] Your Updated Medication List  
  
   
This list is accurate as of: 2/28/17  3:16 PM.  Always use your most recent med list.  
  
  
  
  
 albuterol 90 mcg/actuation inhaler Commonly known as:  PROVENTIL HFA, VENTOLIN HFA, PROAIR HFA Take 2 Puffs by inhalation every four (4) hours as needed for Wheezing. Indications: Chronic Obstructive Pulmonary Disease  
  
 alendronate 70 mg tablet Commonly known as:  FOSAMAX Take 1 Tab by mouth every seven (7) days. aspirin 81 mg chewable tablet Take 1 Tab by mouth daily. clopidogrel 75 mg Tab Commonly known as:  PLAVIX Take 1 Tab by mouth daily. cyanocobalamin 2,500 mcg sublingual tablet Commonly known as:  VITAMIN B12 Take 1 Tab by mouth daily. ergocalciferol 50,000 unit capsule Commonly known as:  ERGOCALCIFEROL Take 1 Cap by mouth every Sunday. fentaNYL 12 mcg/hr patch Commonly known as:  DURAGESIC  
1 Patch by TransDERmal route every seventy-two (72) hours. Max Daily Amount: 1 Patch. glipiZIDE 10 mg tablet Commonly known as:  Mikayla Nordmann Take 1 Tab by mouth two (2) times a day. HYDROcodone-acetaminophen 5-325 mg per tablet Commonly known as:  Екатерина Ludwig Take 1 Tab by mouth every eight (8) hours as needed. Max Daily Amount: 3 Tabs. insulin regular 100 unit/mL injection Commonly known as:  NOVOLIN R, HUMULIN R  
15 Units by SubCUTAneous route three (3) times daily. insulin syringe-needle U-100 Dispense ultrafine 0.5 mL syringes with 31 gauge needle  
  
 ipratropium 0.02 % nebulizer solution Commonly known as:  ATROVENT  
2.5 mL by Nebulization route every four (4) hours (while awake). Indications: PREVENTION OF BRONCHOSPASM WITH CHRONIC BRONCHITIS  
  
 lisinopril-hydroCHLOROthiazide 20-25 mg per tablet Commonly known as:  Ashley Mcardle Take 1 Tab by mouth daily. metFORMIN 1,000 mg tablet Commonly known as:  GLUCOPHAGE  
TAKE ONE TABLET BY MOUTH TWICE DAILY WITH FOOD  
  
 pravastatin 40 mg tablet Commonly known as:  PRAVACHOL Take 1 Tab by mouth daily. umeclidinium 62.5 mcg/actuation inhaler Commonly known as:  INCRUSE ELLIPTA Take 1 Puff by inhalation daily. Patient Instructions Learning About Antiplatelet Medicines After a Stroke Introduction If you have had a stroke, you may have concerns about having another one. You want to do all you can do to avoid this. If your stroke was caused by a blood clot, one of the best things you can do is to take antiplatelet medicines. They can help prevent another stroke. In most cases, you don't take them if you had a stroke caused by a leak in an artery. These medicines are often called blood thinners. But they don't thin your blood. They work to keep platelets from sticking together and forming blood clots. (A platelet is a type of blood cell.) Blood clots can cause a stroke if they block a blood vessel in the brain. So by preventing blood clots, you are helping to prevent a stroke. Examples · Aspirin (Beata, Bufferin, Ecotrin) · Aspirin with dipyridamole (Aggrenox) · Clopidogrel (Plavix) Possible side effects These medicines make your blood take longer than normal to clot. This can cause bleeding, and you may bruise easily. In rare cases, they can cause you to bleed inside your body without an injury. If you have an injury, you might have bleeding that is hard to control. These medicines may have other side effects. Depending on which one you take, you may: 
· Have diarrhea. · Feel sick to your stomach. · Have a headache. · Have some mild belly pain. You may have other side effects or reactions not listed here. Check the information that comes with your medicine. What to know about taking this medicine · Be sure you get instructions about how to take your medicine safely. Blood thinners can cause serious bleeding problems. · Be safe with medicines. Take your medicines exactly as prescribed. Call your doctor if you think you are having a problem with your medicine. · Check with your doctor or pharmacist before you use any other medicines, including over-the-counter medicines. Make sure your doctor knows all of the medicines, vitamins, herbal products, and supplements you take. Taking some medicines together can cause problems. Where can you learn more? Go to http://flores-kendell.info/. Enter B386 in the search box to learn more about \"Learning About Antiplatelet Medicines After a Stroke. \" Current as of: January 27, 2016 Content Version: 11.1 © 4635-6633 Opbeat. Care instructions adapted under license by InStore Audio Network (which disclaims liability or warranty for this information). If you have questions about a medical condition or this instruction, always ask your healthcare professional. [address] any warranty or liability for your use of this information. Introducing [facility] & HEALTH SERVICES! Dear Tyler Mcghee: 
Thank you for requesting a Sqor Sports account. Our records indicate that you already have an active Sqor Sports account. You can access your account anytime at [email or web address] Did you know that you can access your hospital and ER discharge instructions at any time in Sqor Sports? You can also review all of your test results from your hospital stay or ER visit. Additional Information If you have questions, please visit the Frequently Asked Questions section of the Cook Taste Eatt website at [email or web address] Remember, ZenHub is NOT to be used for urgent needs. For medical emergencies, dial 911. Now available from your iPhone and Android! Please provide this summary of care documentation to your next provider. Your primary care clinician is listed as Norma Emanuel. If you have any questions after today's visit, please call [phone].

## 2017-02-28 NOTE — LETTER
NOTIFICATION RETURN TO WORK / SCHOOL 
 
2/28/2017 3:11 PM 
 
Ms. Sandy Panchal [address] To Whom It May Concern: 
 
Sandy Panchal is currently under the care of [address] [place] [address]. She will return to work/school on: 2/29/17. If there are questions or concerns please have the patient contact our office. Sincerely, Brandon Arnold MD

## 2017-02-28 NOTE — PATIENT INSTRUCTIONS
Learning About Antiplatelet Medicines After a Stroke  Introduction  If you have had a stroke, you may have concerns about having another one. You want to do all you can do to avoid this. If your stroke was caused by a blood clot, one of the best things you can do is to take antiplatelet medicines. They can help prevent another stroke. In most cases, you don't take them if you had a stroke caused by a leak in an artery. These medicines are often called blood thinners. But they don't thin your blood. They work to keep platelets from sticking together and forming blood clots. (A platelet is a type of blood cell.) Blood clots can cause a stroke if they block a blood vessel in the brain. So by preventing blood clots, you are helping to prevent a stroke. Examples  · Aspirin (Beata, Bufferin, Ecotrin)  · Aspirin with dipyridamole (Aggrenox)  · Clopidogrel (Plavix)  Possible side effects  These medicines make your blood take longer than normal to clot. This can cause bleeding, and you may bruise easily. In rare cases, they can cause you to bleed inside your body without an injury. If you have an injury, you might have bleeding that is hard to control. These medicines may have other side effects. Depending on which one you take, you may:  · Have diarrhea. · Feel sick to your stomach. · Have a headache. · Have some mild belly pain. You may have other side effects or reactions not listed here. Check the information that comes with your medicine. What to know about taking this medicine  · Be sure you get instructions about how to take your medicine safely. Blood thinners can cause serious bleeding problems. · Be safe with medicines. Take your medicines exactly as prescribed. Call your doctor if you think you are having a problem with your medicine. · Check with your doctor or pharmacist before you use any other medicines, including over-the-counter medicines.  Make sure your doctor knows all of the medicines, vitamins, herbal products, and supplements you take. Taking some medicines together can cause problems. Where can you learn more? Go to http://flores-kendell.info/. Enter P526 in the search box to learn more about \"Learning About Antiplatelet Medicines After a Stroke. \"  Current as of: January 27, 2016  Content Version: 11.1  © 5293-0325 TownSquared. Care instructions adapted under license by Sanivation (which disclaims liability or warranty for this information). If you have questions about a medical condition or this instruction, always ask your healthcare professional. [place] [address] any warranty or liability for your use of this information.

## 2017-03-01 ENCOUNTER — PATIENT OUTREACH (OUTPATIENT)
Dept: FAMILY MEDICINE CLINIC | Age: 67
End: 2017-03-01

## 2017-03-01 ENCOUNTER — TELEPHONE (OUTPATIENT)
Dept: FAMILY MEDICINE CLINIC | Age: 67
End: 2017-03-01

## 2017-03-01 NOTE — PROGRESS NOTES
Incoming call from Mrs. Nalini Castellanos requesting additional information for her work return letter. Her employers require the letter to state that there are no restrictions before she will be allowed to return to her job. Message sent to Dr. Alexis Hackett with the request- also advised Mrs. Nalini Castellanos to call the office directly to make the request.    Mrs. Nalini Castellanos attended her post hospital visit with Dr. Alexis Hackett yesterday.

## 2017-03-01 NOTE — TELEPHONE ENCOUNTER
Pt called today; states she needs the letter to return to work to say \"without limitations\";    Odessa Beebe  Fax # [phone]

## 2017-03-08 DIAGNOSIS — E11.43 TYPE 2 DIABETES MELLITUS WITH DIABETIC AUTONOMIC NEUROPATHY, WITH LONG-TERM CURRENT USE OF INSULIN (HCC): ICD-10-CM

## 2017-03-08 DIAGNOSIS — Z79.4 TYPE 2 DIABETES MELLITUS WITH DIABETIC AUTONOMIC NEUROPATHY, WITH LONG-TERM CURRENT USE OF INSULIN (HCC): ICD-10-CM

## 2017-03-08 RX ORDER — GLIPIZIDE 10 MG/1
TABLET ORAL
Qty: 180 TAB | Refills: 0 | Status: SHIPPED | OUTPATIENT
Start: 2017-03-08 | End: 2017-05-18 | Stop reason: SDUPTHER

## 2017-03-20 ENCOUNTER — PATIENT OUTREACH (OUTPATIENT)
Dept: FAMILY MEDICINE CLINIC | Age: 67
End: 2017-03-20

## 2017-03-20 RX ORDER — FLUOXETINE HYDROCHLORIDE 20 MG/1
20 CAPSULE ORAL DAILY
Qty: 30 CAP | Refills: 0 | Status: SHIPPED | OUTPATIENT
Start: 2017-03-20 | End: 2017-07-17 | Stop reason: ALTCHOICE

## 2017-03-20 NOTE — PROGRESS NOTES
Incoming call from Mrs. Castro Ramirez requesting a prescription for Prozac. She states that she and Dr. Beverly Stahl talked about something for depression at her last office visit and she had been on Prozac in the past.  Mrs. Castro Ramirez describes feeling very anxious with her work situation- both in terms of driving to and from the site as well as having to adjust to a new work site where the conditions are different than what she had been used to. The patient states she is very nervous about driving and then bursts into tears when she arrives at her destination, even driving to and from work. Mrs. Castro Ramirez states she is checking her blood pressure and it averages between 120-130 over 80-85. She questions the accuracy of her blood pressure cuff and I suggested she stop at a local fire department and request they check her blood pressure. Mrs. Castro Ramirez saw her eye doctor recently. He told her she had some bleeding in one eye and that she would need cataract surgery in the future. The doctor wants to wait at least six months before the surgery to give the eye time to recover. She also saw Dr. 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 She will be seeing a neuro specialist in [place] on 3/28 for follow up. Dr. Stefania Shea said she would need a stent. Patient has completed 30 days since [facility] discharge on 2/20/17 for CVA. Patient attended PCP follow up visit on 2/28/17. [facility] nurse navigator notified of ongoing issues and potential for case management. This episode is closed.

## 2017-03-28 ENCOUNTER — OFFICE VISIT (OUTPATIENT)
Dept: FAMILY MEDICINE CLINIC | Age: 67
End: 2017-03-28

## 2017-03-28 VITALS
DIASTOLIC BLOOD PRESSURE: 72 MMHG | OXYGEN SATURATION: 96 % | WEIGHT: 176 LBS | SYSTOLIC BLOOD PRESSURE: 110 MMHG | HEIGHT: 61 IN | BODY MASS INDEX: 33.23 KG/M2 | RESPIRATION RATE: 22 BRPM | TEMPERATURE: 97.8 F | HEART RATE: 107 BPM

## 2017-03-28 DIAGNOSIS — E11.49 TYPE 2 DIABETES MELLITUS WITH OTHER NEUROLOGIC COMPLICATION, WITH LONG-TERM CURRENT USE OF INSULIN (HCC): Primary | ICD-10-CM

## 2017-03-28 DIAGNOSIS — Z79.4 TYPE 2 DIABETES MELLITUS WITH OTHER NEUROLOGIC COMPLICATION, WITH LONG-TERM CURRENT USE OF INSULIN (HCC): Primary | ICD-10-CM

## 2017-03-28 DIAGNOSIS — I65.22 STENOSIS OF LEFT CAROTID ARTERY: ICD-10-CM

## 2017-03-28 DIAGNOSIS — G56.01 CARPAL TUNNEL SYNDROME OF RIGHT WRIST: ICD-10-CM

## 2017-03-28 DIAGNOSIS — K52.9 GASTROENTERITIS: ICD-10-CM

## 2017-03-28 DIAGNOSIS — I10 ESSENTIAL HYPERTENSION: ICD-10-CM

## 2017-03-28 LAB
ALBUMIN UR QL STRIP: NORMAL MG/L
CREATININE, URINE POC: NORMAL MG/DL
HBA1C MFR BLD HPLC: 6.9 %
MICROALBUMIN/CREAT RATIO POC: NORMAL MG/G

## 2017-03-28 RX ORDER — ONDANSETRON 4 MG/1
4 TABLET, FILM COATED ORAL
Qty: 60 TAB | Refills: 0 | Status: SHIPPED | OUTPATIENT
Start: 2017-03-28 | End: 2017-04-18 | Stop reason: SDUPTHER

## 2017-03-28 RX ORDER — ONDANSETRON 4 MG/1
4 TABLET, FILM COATED ORAL
COMMUNITY
End: 2017-03-28 | Stop reason: SDUPTHER

## 2017-03-28 RX ORDER — DICYCLOMINE HYDROCHLORIDE 20 MG/1
20 TABLET ORAL EVERY 6 HOURS
COMMUNITY
End: 2017-09-26 | Stop reason: ALTCHOICE

## 2017-03-28 NOTE — MR AVS SNAPSHOT
Visit Information Date & Time Provider Department Dept. Phone Encounter #  
 3/28/2017  7:45 AM Geeta Amaya, 3 [facility] [phone] [id number] Your Appointments 3/30/2017 10:45 AM  
Follow Up with Justin Downs MD  
[facility] [facility]) Appt Note: 1 month back fu  no copay; PT CALLED AND RS 03/01/17 FOR THIS NEW DATE. [address] [facility] [address]  
[phone]  
  
   
 [address] [facility] [address] 5/18/2017  1:00 PM  
Follow Up with Geeta Amaya MD  
3 [facility]) Appt Note: f/u per mychart [address] [facility] [address]  
[phone]  
  
   
 [address] [facility] [address] [facility] Upcoming Health Maintenance Date Due MICROALBUMIN Q1 4/18/2017 MEDICARE YEARLY EXAM 7/27/2017 HEMOGLOBIN A1C Q6M 8/19/2017 EYE EXAM RETINAL OR DILATED Q1 2/7/2018 LIPID PANEL Q1 2/19/2018 FOOT EXAM Q1 3/28/2018 BREAST CANCER SCRN MAMMOGRAM 8/11/2018 GLAUCOMA SCREENING Q2Y 2/7/2019 COLONOSCOPY 10/6/2019 DTaP/Tdap/Td series (2 - Td) 7/26/2026 Allergies as of 3/28/2017  Review Complete On: 3/28/2017 By: Geeta Amaya MD  
  
 Severity Noted Reaction Type Reactions Percocet [Oxycodone-acetaminophen]  10/08/2010    Rash, Itching Can Take Percocet but must take Benadryl for itching Perfume Ht52  02/05/2015    Rash Perfume specific:  MUSK Current Immunizations  Reviewed on 4/18/2016 Name Date Influenza High Dose Vaccine PF 10/19/2016  9:33 AM  
 Influenza Vaccine 10/19/2015  8:47 AM, 10/17/2013 Influenza Vaccine Whole 12/1/2008 Pneumococcal Polysaccharide (PPSV-23) 4/18/2016  1:50 PM  
 Pneumococcal Vaccine (Unspecified Type) 12/1/2008 Not reviewed this visit You Were Diagnosed With   
  
 Codes Comments Type 2 diabetes mellitus with other neurologic complication, with long-term current use of insulin (HCC)    -  Primary ICD-10-CM: E11.49, Z79.4 Essential hypertension     ICD-10-CM: I10 
ICD-9-CM: 401.9 Stenosis of left carotid artery     ICD-10-CM: I31.21 
ICD-9-CM: 433.10 Gastroenteritis     ICD-10-CM: K52.9 ICD-9-CM: 558. 9 Carpal tunnel syndrome of right wrist     ICD-10-CM: G56.01 
ICD-9-CM: 354.0 Vitals BP Pulse Temp Resp Height(growth percentile) Weight(growth percentile) 110/72 (!) 107 97.8 °F (36.6 °C) (Oral) 22 5' 1\" (1.549 m) 176 lb (79.8 kg) SpO2 BMI OB Status Smoking Status 96% 33.25 kg/m2 Hysterectomy Former Smoker BMI and BSA Data Body Mass Index Body Surface Area  
 33.25 kg/m 2 1.85 m 2 Preferred Pharmacy Pharmacy Name Phone [facility] [address] [phone] Your Updated Medication List  
  
   
This list is accurate as of: 3/28/17  8:00 AM.  Always use your most recent med list.  
  
  
  
  
 albuterol 90 mcg/actuation inhaler Commonly known as:  PROVENTIL HFA, VENTOLIN HFA, PROAIR HFA Take 2 Puffs by inhalation every four (4) hours as needed for Wheezing. Indications: Chronic Obstructive Pulmonary Disease  
  
 alendronate 70 mg tablet Commonly known as:  FOSAMAX Take 1 Tab by mouth every seven (7) days. clopidogrel 75 mg Tab Commonly known as:  PLAVIX Take 1 Tab by mouth daily. cyanocobalamin 2,500 mcg sublingual tablet Commonly known as:  VITAMIN B12 Take 1 Tab by mouth daily. dicyclomine 20 mg tablet Commonly known as:  BENTYL Take 20 mg by mouth every six (6) hours. ergocalciferol 50,000 unit capsule Commonly known as:  ERGOCALCIFEROL Take 1 Cap by mouth every Sunday. fentaNYL 12 mcg/hr patch Commonly known as:  DURAGESIC  
1 Patch by TransDERmal route every seventy-two (72) hours. Max Daily Amount: 1 Patch. FLUoxetine 20 mg capsule Commonly known as:  PROzac Take 1 Cap by mouth daily. glipiZIDE 10 mg tablet Commonly known as:  GLUCOTROL  
TAKE ONE TABLET BY MOUTH TWICE DAILY HYDROcodone-acetaminophen 5-325 mg per tablet Commonly known as:  Femi [place] Take 1 Tab by mouth every eight (8) hours as needed. Max Daily Amount: 3 Tabs. insulin regular 100 unit/mL injection Commonly known as:  NOVOLIN R, HUMULIN R  
15 Units by SubCUTAneous route three (3) times daily. insulin syringe-needle U-100 Dispense ultrafine 0.5 mL syringes with 31 gauge needle  
  
 ipratropium 0.02 % nebulizer solution Commonly known as:  ATROVENT  
2.5 mL by Nebulization route every four (4) hours (while awake). Indications: PREVENTION OF BRONCHOSPASM WITH CHRONIC BRONCHITIS  
  
 lisinopril-hydroCHLOROthiazide 20-25 mg per tablet Commonly known as:  Lucetta Clayton Take 1 Tab by mouth daily. metFORMIN 1,000 mg tablet Commonly known as:  GLUCOPHAGE  
TAKE ONE TABLET BY MOUTH TWICE DAILY WITH FOOD  
  
 ondansetron hcl 4 mg tablet Commonly known as:  ZOFRAN (AS HYDROCHLORIDE) Take 1 Tab by mouth every eight (8) hours as needed for Nausea. pravastatin 40 mg tablet Commonly known as:  PRAVACHOL Take 1 Tab by mouth daily. umeclidinium 62.5 mcg/actuation inhaler Commonly known as:  INCRUSE ELLIPTA Take 1 Puff by inhalation daily. Prescriptions Sent to Pharmacy Refills  
 ondansetron hcl (ZOFRAN, AS HYDROCHLORIDE,) 4 mg tablet 0 Sig: Take 1 Tab by mouth every eight (8) hours as needed for Nausea. Class: Normal  
 Pharmacy: [address], [address] [place] [address] Ph #: [phone] Route: Oral  
  
We Performed the Following AMB POC HEMOGLOBIN A1C [11957 CPT(R)] AMB POC URINE, MICROALBUMIN, SEMIQUANT (3 RESULTS) [12855 CPT(R)]  DIABETES FOOT EXAM [7 Custom] Patient Instructions Carpal Tunnel Syndrome: Exercises Your Care Instructions Here are some examples of typical rehabilitation exercises for your condition. Start each exercise slowly. Ease off the exercise if you start to have pain. Your doctor or your physical or occupational therapist will tell you when you can start these exercises and which ones will work best for you. How to do the exercises Note: When you no longer have pain or numbness, you can do exercises to help prevent carpal tunnel syndrome from coming back. Do not do any stretch or movement that is uncomfortable or painful. Warm-up stretches 1. Rotate your wrist up, down, and from side to side. Repeat 4 times. 2. Stretch your fingers far apart. Relax them, and then stretch them again. Repeat 4 times. 3. Stretch your thumb by pulling it back gently, holding it, and then releasing it. Repeat 4 times. Prayer stretch 1. Start with your palms together in front of your chest just below your chin. 2. Slowly lower your hands toward your waistline, keeping your hands close to your stomach and your palms together until you feel a mild to moderate stretch under your forearms. 3. Hold for at least 15 to 30 seconds. Repeat 2 to 4 times. Wrist flexor stretch 1. Extend your arm in front of you with your palm up. 2. Bend your wrist, pointing your hand toward the floor. 3. With your other hand, gently bend your wrist farther until you feel a mild to moderate stretch in your forearm. 4. Hold for at least 15 to 30 seconds. Repeat 2 to 4 times. Wrist extensor stretch Repeat steps 1 through 4 of the stretch above, but begin with your extended hand palm down. Follow-up care is a key part of your treatment and safety. Be sure to make and go to all appointments, and call your doctor if you are having problems. It's also a good idea to know your test results and keep a list of the medicines you take. Where can you learn more? Go to http://flores-kendell.info/. Enter B221 in the search box to learn more about \"Carpal Tunnel Syndrome: Exercises. \" Current as of: May 23, 2016 Content Version: 11.1 © 8443-4207 Pax Worldwide. Care instructions adapted under license by BULX (which disclaims liability or warranty for this information). If you have questions about a medical condition or this instruction, always ask your healthcare professional. [address] any warranty or liability for your use of this information. Introducing [facility] & HEALTH SERVICES! Dear Daphnie Ny: 
Thank you for requesting a Playfire account. Our records indicate that you already have an active Playfire account. You can access your account anytime at [email or web address] Did you know that you can access your hospital and ER discharge instructions at any time in Playfire? You can also review all of your test results from your hospital stay or ER visit. Additional Information If you have questions, please visit the Frequently Asked Questions section of the Playfire website at [email or web address] Remember, Playfire is NOT to be used for urgent needs. For medical emergencies, dial 911. Now available from your iPhone and Android! Please provide this summary of care documentation to your next provider. Your primary care clinician is listed as Norma Emanuel. If you have any questions after today's visit, please call [phone].

## 2017-03-28 NOTE — PROGRESS NOTES
Sandy Rodriguez is a 73 y.o. female  Patient here for ER follow up. 1. Have you been to the ER, urgent care clinic since your last visit? Hospitalized since your last visit? Yes Where: [facility]    2. Have you seen or consulted any other health care providers outside of the [address] [place] [address] since your last visit? Include any pap smears or colon screening.  No

## 2017-03-28 NOTE — PATIENT INSTRUCTIONS
Carpal Tunnel Syndrome: Exercises  Your Care Instructions  Here are some examples of typical rehabilitation exercises for your condition. Start each exercise slowly. Ease off the exercise if you start to have pain. Your doctor or your physical or occupational therapist will tell you when you can start these exercises and which ones will work best for you. How to do the exercises  Note: When you no longer have pain or numbness, you can do exercises to help prevent carpal tunnel syndrome from coming back. Do not do any stretch or movement that is uncomfortable or painful. Warm-up stretches  1. Rotate your wrist up, down, and from side to side. Repeat 4 times. 2. Stretch your fingers far apart. Relax them, and then stretch them again. Repeat 4 times. 3. Stretch your thumb by pulling it back gently, holding it, and then releasing it. Repeat 4 times. Prayer stretch    1. Start with your palms together in front of your chest just below your chin. 2. Slowly lower your hands toward your waistline, keeping your hands close to your stomach and your palms together until you feel a mild to moderate stretch under your forearms. 3. Hold for at least 15 to 30 seconds. Repeat 2 to 4 times. Wrist flexor stretch    1. Extend your arm in front of you with your palm up. 2. Bend your wrist, pointing your hand toward the floor. 3. With your other hand, gently bend your wrist farther until you feel a mild to moderate stretch in your forearm. 4. Hold for at least 15 to 30 seconds. Repeat 2 to 4 times. Wrist extensor stretch    Repeat steps 1 through 4 of the stretch above, but begin with your extended hand palm down. Follow-up care is a key part of your treatment and safety. Be sure to make and go to all appointments, and call your doctor if you are having problems. It's also a good idea to know your test results and keep a list of the medicines you take. Where can you learn more?   Go to http://flores-kendell.info/. Enter X022 in the search box to learn more about \"Carpal Tunnel Syndrome: Exercises. \"  Current as of: May 23, 2016  Content Version: 11.1  © 6695-4429 [employer]. Care instructions adapted under license by [employer] (which disclaims liability or warranty for this information). If you have questions about a medical condition or this instruction, always ask your healthcare professional. [place] [address] any warranty or liability for your use of this information.

## 2017-03-28 NOTE — PROGRESS NOTES
Assessment/Plan:    1. Type 2 diabetes mellitus with other neurologic complication, with long-term current use of insulin (HCC)  -A1c 6.9. Cont current regimen. - AMB POC HEMOGLOBIN A1C  - AMB POC URINE, MICROALBUMIN, SEMIQUANT (3 RESULTS)  -  DIABETES FOOT EXAM    2. Essential hypertension  -cont current. 3. Stenosis of left carotid artery  -has appt with vascular upcoming    4. Gastroenteritis- if not improved over next several days, RTO  - ondansetron hcl (ZOFRAN, AS HYDROCHLORIDE,) 4 mg tablet; Take 1 Tab by mouth every eight (8) hours as needed for Nausea. Dispense: 60 Tab; Refill: 0    5. Carpal tunnel syndrome of right wrist  -pt to wear wrist splint. Home exercises. If not improved over 1 mo, will refer to hand surgery. The plan was discussed with the patient. The patient verbalized understanding and is in agreement with the plan. All medication potential side effects were discussed with the patient. Health Maintenance:   Health Maintenance   Topic Date Due    MICROALBUMIN Q1  04/18/2017    MEDICARE YEARLY EXAM  07/27/2017    HEMOGLOBIN A1C Q6M  08/19/2017    EYE EXAM RETINAL OR DILATED Q1  02/07/2018    LIPID PANEL Q1  02/19/2018    FOOT EXAM Q1  03/28/2018    BREAST CANCER SCRN MAMMOGRAM  08/11/2018    GLAUCOMA SCREENING Q2Y  02/07/2019    COLONOSCOPY  10/06/2019    DTaP/Tdap/Td series (2 - Td) 07/26/2026    Hepatitis C Screening  Completed    OSTEOPOROSIS SCREENING (DEXA)  Completed    ZOSTER VACCINE AGE 60>  Addressed    Pneumococcal 65+ Low/Medium Risk  Completed    INFLUENZA AGE 9 TO ADULT  Addressed       Sandy Rodriguez is a 73 y.o. female and presents with Hospital Follow Up     Subjective:  DM2 - on regular insulin 15 units TID, metformin and glucotrol. States her fasting bs are running 180s. Having lows of 80 with symptoms. Happening 3-4x/week. A1c is 6.9. HTN - in setting of CAD (medically managed) - bp controlled.   On ACEI    Pt was recently in ER 3 days ago.  Dx with viral gastroenteritis. Having continued nausea, vomiting has resolved. Having diarrhea. Having sx x 5 days. No f/c. Has generalized abd pain. CT abd didn't how anything acute. Has L carotid artery high grade stenosis. Has appt with vascular later today. She notes paresthesias over first 3 fingers of R hand. She is R handed and works as a . Happens intermittently over the course of the day. ROS:  Constitutional: No recent weight change. No weakness/fatigue. No f/c. Cardiovascular: No CP/palpitations. No NINA/orthopnea/PND. Respiratory: No cough/sputum, dyspnea, wheezing. Gastointestinal: No dysphagia, reflux.  + nausea. No constipation/diarrhea. No melena/rectal bleeding. Genitourinary: No dysuria, urinary hesitancy, nocturia, hematuria. No incontinence. Endo: No heat/cold intolerance, no polyuria/polydypsia. Heme: No h/o anemia. No easy bleeding/bruising. Neurological: No seizures/numbness/weakness. + paresthesias. The problem list was updated as a part of today's visit.   Patient Active Problem List   Diagnosis Code    Gastroparesis K31.84    Vitamin D deficiency E55.9    Hx of breast cancer Z85.3    Chronic pain syndrome G89.4    Osteoarthrosis, unspecified whether generalized or localized, unspecified site C49.62    Diastolic congestive heart failure ([facility]) I50.30    COPD (chronic obstructive pulmonary disease) ([facility]) J44.9    GERD (gastroesophageal reflux disease) K21.9    Tobacco dependence F17.200    Chronic radicular lumbar pain M54.16, G89.29    Scoliosis M41.9    Essential hypertension I10    Pure hypercholesterolemia E78.00    Type II diabetes mellitus ([facility]) E11.9    CAD (coronary artery disease) I25.10    Spinal stenosis of lumbar region with neurogenic claudication M48.06    Lumbar spinal stenosis M48.06    S/P lumbar fusion Z98.1    Osteoporosis M81.0    Left hip pain M25.552    Lumbar compression fracture ([facility]) S32.000A  Hemianopsia H53.47    Occipital stroke (HCC) I63.9    Reactive depression F32.9       The PSH, FH were reviewed. SH:  Social History   Substance Use Topics    Smoking status: Former Smoker     Packs/day: 0.50     Years: 35.00     Types: Cigarettes     Quit date: 2/20/2017    Smokeless tobacco: Never Used    Alcohol use No       Medications/Allergies:  Current Outpatient Prescriptions on File Prior to Visit   Medication Sig Dispense Refill    clopidogrel (PLAVIX) 75 mg tab Take 1 Tab by mouth daily. 90 Tab 3    FLUoxetine (PROZAC) 20 mg capsule Take 1 Cap by mouth daily. 30 Cap 0    glipiZIDE (GLUCOTROL) 10 mg tablet TAKE ONE TABLET BY MOUTH TWICE DAILY 180 Tab 0    cyanocobalamin (VITAMIN B12) 2,500 mcg sublingual tablet Take 1 Tab by mouth daily. 100 Tab 3    ergocalciferol (ERGOCALCIFEROL) 50,000 unit capsule Take 1 Cap by mouth every Sunday. 4 Cap 3    HYDROcodone-acetaminophen (NORCO) 5-325 mg per tablet Take 1 Tab by mouth every eight (8) hours as needed. Max Daily Amount: 3 Tabs. 20 Tab 0    alendronate (FOSAMAX) 70 mg tablet Take 1 Tab by mouth every seven (7) days. 30 Tab 0    metFORMIN (GLUCOPHAGE) 1,000 mg tablet TAKE ONE TABLET BY MOUTH TWICE DAILY WITH FOOD 180 Tab 0    fentaNYL (DURAGESIC) 12 mcg/hr patch 1 Patch by TransDERmal route every seventy-two (72) hours. Max Daily Amount: 1 Patch. 10 Patch 0    insulin regular (NOVOLIN R, HUMULIN R) 100 unit/mL injection 15 Units by SubCUTAneous route three (3) times daily. 4 Vial 0    ipratropium (ATROVENT) 0.02 % nebulizer solution 2.5 mL by Nebulization route every four (4) hours (while awake). Indications: PREVENTION OF BRONCHOSPASM WITH CHRONIC BRONCHITIS 60 mL 1    albuterol (PROVENTIL HFA, VENTOLIN HFA, PROAIR HFA) 90 mcg/actuation inhaler Take 2 Puffs by inhalation every four (4) hours as needed for Wheezing.  Indications: Chronic Obstructive Pulmonary Disease 1 Inhaler 1    umeclidinium (INCRUSE ELLIPTA) 62.5 mcg/actuation inhaler Take 1 Puff by inhalation daily. 1 Inhaler 1    lisinopril-hydrochlorothiazide (PRINZIDE, ZESTORETIC) 20-25 mg per tablet Take 1 Tab by mouth daily. 90 Tab 1    pravastatin (PRAVACHOL) 40 mg tablet Take 1 Tab by mouth daily. 90 Tab 1    insulin syringe-needle U-100 Dispense ultrafine 0.5 mL syringes with 31 gauge needle 100 Syringe 11    aspirin 81 mg chewable tablet Take 1 Tab by mouth daily. No current facility-administered medications on file prior to visit. Allergies   Allergen Reactions    Percocet [Oxycodone-Acetaminophen] Rash and Itching     Can Take Percocet but must take Benadryl for itching     Perfume Ht52 Rash     Perfume specific:  MUSK       Objective:  Visit Vitals    /72    Pulse (!) 107    Temp 97.8 °F (36.6 °C) (Oral)    Resp 22    Ht 5' 1\" (1.549 m)    Wt 176 lb (79.8 kg)    SpO2 96%    BMI 33.25 kg/m2      Constitutional: Well developed, nourished, no distress, alert, obese habitus   CV: S1, S2.  RRR. No murmurs/rubs. No thrills palpated. No carotid bruits. Intact distal pulses. No edema. Pulm: No abnormalities on inspection. Clear to auscultation bilaterally. No wheezing/rhonchi. Normal effort. GI: Soft, nontender, nondistended. Normal active bowel sounds. No rebound or guarding. Neuro: A/O x 3. No focal motor or sensory deficits. Speech normal. +tinnel sign on R   Skin: No lesions/rashes on inspection. Psych: Appropriate affect, judgement and insight. Short-term memory intact.      Diabetic foot exam:     bilat: Filament test normal sensation with micro filament       Labwork and Ancillary Studies:    CBC w/Diff  Lab Results   Component Value Date/Time    WBC 9.3 02/19/2017 05:01 AM    HGB 10.6 02/19/2017 05:01 AM    PLATELET 492 45/30/6715 05:01 AM         Basic Metabolic Profile/LFTs  Lab Results   Component Value Date/Time    Sodium 136 02/20/2017 05:39 AM    Potassium 3.8 02/20/2017 05:39 AM    Chloride 102 02/20/2017 05:39 AM CO2 24 02/20/2017 05:39 AM    Anion gap 10 02/02/2017 07:58 AM    Glucose 209 02/20/2017 05:39 AM    BUN 20 02/20/2017 05:39 AM    Creatinine 0.9 02/20/2017 05:39 AM    BUN/Creatinine ratio 22 02/02/2017 07:58 AM    GFR est AA >60.0 02/20/2017 05:39 AM    GFR est non-AA >60 02/20/2017 05:39 AM    Calcium 8.3 02/20/2017 05:39 AM      Lab Results   Component Value Date/Time    ALT (SGPT) 22 02/19/2017 05:01 AM    AST (SGOT) 17 02/19/2017 05:01 AM    Alk.  phosphatase 136 02/19/2017 05:01 AM    Bilirubin, direct <0.1 04/28/2016 03:42 AM    Bilirubin, total 0.6 02/19/2017 05:01 AM       Cholesterol  Lab Results   Component Value Date/Time    Cholesterol, total 146 02/19/2017 05:01 AM    HDL Cholesterol 39 02/19/2017 05:01 AM    LDL, calculated 64 02/19/2017 05:01 AM    Triglyceride 216 02/19/2017 05:01 AM    CHOL/HDL Ratio 3.7 02/19/2017 05:01 AM

## 2017-03-30 ENCOUNTER — TELEPHONE (OUTPATIENT)
Dept: FAMILY MEDICINE CLINIC | Age: 67
End: 2017-03-30

## 2017-03-30 NOTE — TELEPHONE ENCOUNTER
Pt called stating she was told to call back if she is not feeling better in a few days. She states she is feeling worse.

## 2017-03-30 NOTE — TELEPHONE ENCOUNTER
Is she still having primarily diarrhea symptoms? If so, i'm going to put labs in for stool test.  She can  stool kit from lab.

## 2017-04-10 ENCOUNTER — TELEPHONE (OUTPATIENT)
Dept: FAMILY MEDICINE CLINIC | Age: 67
End: 2017-04-10

## 2017-04-14 DIAGNOSIS — E11.43 TYPE 2 DIABETES MELLITUS WITH AUTONOMIC NEUROPATHY (HCC): ICD-10-CM

## 2017-04-14 RX ORDER — METFORMIN HYDROCHLORIDE 1000 MG/1
TABLET ORAL
Qty: 180 TAB | Refills: 0 | Status: SHIPPED | OUTPATIENT
Start: 2017-04-14 | End: 2017-12-06 | Stop reason: SDUPTHER

## 2017-04-18 ENCOUNTER — HOSPITAL ENCOUNTER (OUTPATIENT)
Dept: LAB | Age: 67
Discharge: HOME | End: 2017-04-18
Payer: MEDICARE

## 2017-04-18 ENCOUNTER — OFFICE VISIT (OUTPATIENT)
Dept: FAMILY MEDICINE CLINIC | Age: 67
End: 2017-04-18

## 2017-04-18 VITALS
TEMPERATURE: 97 F | HEART RATE: 110 BPM | RESPIRATION RATE: 24 BRPM | SYSTOLIC BLOOD PRESSURE: 102 MMHG | HEIGHT: 61 IN | DIASTOLIC BLOOD PRESSURE: 60 MMHG | WEIGHT: 173 LBS | OXYGEN SATURATION: 97 % | BODY MASS INDEX: 32.66 KG/M2

## 2017-04-18 DIAGNOSIS — R19.7 DIARRHEA, UNSPECIFIED TYPE: ICD-10-CM

## 2017-04-18 DIAGNOSIS — R10.32 LEFT LOWER QUADRANT PAIN: ICD-10-CM

## 2017-04-18 DIAGNOSIS — R11.10 VOMITING, INTRACTABILITY OF VOMITING NOT SPECIFIED, PRESENCE OF NAUSEA NOT SPECIFIED, UNSPECIFIED VOMITING TYPE: Primary | ICD-10-CM

## 2017-04-18 DIAGNOSIS — H91.90 HEARING LOSS, UNSPECIFIED HEARING LOSS TYPE, UNSPECIFIED LATERALITY: ICD-10-CM

## 2017-04-18 DIAGNOSIS — R11.10 VOMITING, INTRACTABILITY OF VOMITING NOT SPECIFIED, PRESENCE OF NAUSEA NOT SPECIFIED, UNSPECIFIED VOMITING TYPE: ICD-10-CM

## 2017-04-18 DIAGNOSIS — H65.90 MIDDLE EAR EFFUSION, UNSPECIFIED LATERALITY: ICD-10-CM

## 2017-04-18 LAB
ALBUMIN SERPL BCP-MCNC: 3.8 G/DL (ref 3.4–5)
ALBUMIN/GLOB SERPL: 1.2 {RATIO} (ref 0.8–1.7)
ALP SERPL-CCNC: 104 U/L (ref 45–117)
ALT SERPL-CCNC: 25 U/L (ref 13–56)
ANION GAP BLD CALC-SCNC: 11 MMOL/L (ref 3–18)
AST SERPL W P-5'-P-CCNC: 15 U/L (ref 15–37)
BILIRUB SERPL-MCNC: 0.2 MG/DL (ref 0.2–1)
BUN SERPL-MCNC: 25 MG/DL (ref 7–18)
BUN/CREAT SERPL: 20 (ref 12–20)
CALCIUM SERPL-MCNC: 9.4 MG/DL (ref 8.5–10.1)
CHLORIDE SERPL-SCNC: 97 MMOL/L (ref 100–108)
CO2 SERPL-SCNC: 26 MMOL/L (ref 21–32)
CREAT SERPL-MCNC: 1.23 MG/DL (ref 0.6–1.3)
ERYTHROCYTE [DISTWIDTH] IN BLOOD BY AUTOMATED COUNT: 14.3 % (ref 11.6–14.5)
GLOBULIN SER CALC-MCNC: 3.3 G/DL (ref 2–4)
GLUCOSE SERPL-MCNC: 170 MG/DL (ref 74–99)
HCT VFR BLD AUTO: 32.8 % (ref 35–45)
HGB BLD-MCNC: 10.8 G/DL (ref 12–16)
LIPASE SERPL-CCNC: 373 U/L (ref 73–393)
MCH RBC QN AUTO: 29.4 PG (ref 24–34)
MCHC RBC AUTO-ENTMCNC: 32.9 G/DL (ref 31–37)
MCV RBC AUTO: 89.4 FL (ref 74–97)
PLATELET # BLD AUTO: 279 K/UL (ref 135–420)
PMV BLD AUTO: 8.5 FL (ref 9.2–11.8)
POTASSIUM SERPL-SCNC: 4.3 MMOL/L (ref 3.5–5.5)
PROT SERPL-MCNC: 7.1 G/DL (ref 6.4–8.2)
RBC # BLD AUTO: 3.67 M/UL (ref 4.2–5.3)
SODIUM SERPL-SCNC: 134 MMOL/L (ref 136–145)
WBC # BLD AUTO: 7.6 K/UL (ref 4.6–13.2)

## 2017-04-18 PROCEDURE — 80053 COMPREHEN METABOLIC PANEL: CPT | Performed by: INTERNAL MEDICINE

## 2017-04-18 PROCEDURE — 85027 COMPLETE CBC AUTOMATED: CPT | Performed by: INTERNAL MEDICINE

## 2017-04-18 PROCEDURE — 36415 COLL VENOUS BLD VENIPUNCTURE: CPT | Performed by: INTERNAL MEDICINE

## 2017-04-18 PROCEDURE — 83690 ASSAY OF LIPASE: CPT | Performed by: INTERNAL MEDICINE

## 2017-04-18 RX ORDER — ONDANSETRON 4 MG/1
4 TABLET, FILM COATED ORAL
Qty: 60 TAB | Refills: 0 | Status: SHIPPED | OUTPATIENT
Start: 2017-04-18 | End: 2017-07-17 | Stop reason: ALTCHOICE

## 2017-04-18 RX ORDER — AZITHROMYCIN 500 MG/1
500 TABLET, FILM COATED ORAL DAILY
Qty: 5 TAB | Refills: 0 | Status: SHIPPED | OUTPATIENT
Start: 2017-04-18 | End: 2017-04-23

## 2017-04-18 RX ORDER — METRONIDAZOLE 500 MG/1
500 TABLET ORAL 2 TIMES DAILY
Qty: 14 TAB | Refills: 0 | Status: SHIPPED | OUTPATIENT
Start: 2017-04-18 | End: 2017-04-25

## 2017-04-18 NOTE — MR AVS SNAPSHOT
Visit Information Date & Time Provider Department Dept. Phone Encounter #  
 4/18/2017 11:15 AM Coty Rodriguez, 3 [facility] [phone] [id number] Your Appointments 4/18/2017 11:15 AM  
SICK VISIT with Coty Rodriguez MD  
3 [facility] [address] [place] [address]) Appt Note: stomach problems [address] [facility] [address] [phone]  
  
   
 [address] [facility] [address] [facility] 5/17/2017  1:45 PM  
Follow Up with Giancarlo Donohue MD  
[facility] ([address] [place] [address]) Appt Note: 1 month back fu  no copay; PT CALLED AND RS 03/01/17 FOR THIS NEW DATE.; 2 month back fu no copay [address] [facility] [address] [phone]  
  
   
 [address] [facility] [address] 7/17/2017  7:45 AM  
Follow Up with Coty Rodriguez MD  
3 [address]) Appt Note: f/u per mychart; r/s for later [address] [facility] [address] [phone]  
  
   
 [address] [facility] [address] [facility] Upcoming Health Maintenance Date Due  
 MEDICARE YEARLY EXAM 7/27/2017 HEMOGLOBIN A1C Q6M 9/28/2017 EYE EXAM RETINAL OR DILATED Q1 2/7/2018 LIPID PANEL Q1 2/19/2018 FOOT EXAM Q1 3/28/2018 MICROALBUMIN Q1 3/28/2018 BREAST CANCER SCRN MAMMOGRAM 8/11/2018 GLAUCOMA SCREENING Q2Y 2/7/2019 COLONOSCOPY 10/6/2019 DTaP/Tdap/Td series (2 - Td) 7/26/2026 Allergies as of 4/18/2017  Review Complete On: 4/18/2017 By: Coty Rodriguez MD  
  
 Severity Noted Reaction Type Reactions Percocet [Oxycodone-acetaminophen]  10/08/2010    Rash, Itching Can Take Percocet but must take Benadryl for itching Perfume Ht52  02/05/2015    Rash Perfume specific:  MUSK Current Immunizations  Reviewed on 4/18/2016 Name Date Influenza High Dose Vaccine PF 10/19/2016  9:33 AM  
 Influenza Vaccine 10/19/2015  8:47 AM, 10/17/2013 Influenza Vaccine Whole 12/1/2008 Pneumococcal Polysaccharide (PPSV-23) 4/18/2016  1:50 PM  
 Pneumococcal Vaccine (Unspecified Type) 12/1/2008 Not reviewed this visit You Were Diagnosed With   
  
 Codes Comments Vomiting, intractability of vomiting not specified, presence of nausea not specified, unspecified vomiting type    -  Primary ICD-10-CM: R11.10 ICD-9-CM: 787.03 Diarrhea, unspecified type     ICD-10-CM: R19.7 ICD-9-CM: 787.91 Left lower quadrant pain     ICD-10-CM: R10.32 
ICD-9-CM: 789.04 Vitals BP Pulse Temp Resp Height(growth percentile) Weight(growth percentile) 102/60 (BP 1 Location: Right arm, BP Patient Position: Sitting) (!) 110 97 °F (36.1 °C) (Temporal) 24 5' 1\" (1.549 m) 173 lb (78.5 kg) SpO2 BMI OB Status Smoking Status 97% 32.69 kg/m2 Hysterectomy Former Smoker BMI and BSA Data Body Mass Index Body Surface Area  
 32.69 kg/m 2 1.84 m 2 Preferred Pharmacy Pharmacy Name Phone [place] [id number] [phone] Your Updated Medication List  
  
   
This list is accurate as of: 4/18/17 10:39 AM.  Always use your most recent med list.  
  
  
  
  
 albuterol 90 mcg/actuation inhaler Commonly known as:  PROVENTIL HFA, VENTOLIN HFA, PROAIR HFA Take 2 Puffs by inhalation every four (4) hours as needed for Wheezing. Indications: Chronic Obstructive Pulmonary Disease  
  
 alendronate 70 mg tablet Commonly known as:  FOSAMAX Take 1 Tab by mouth every seven (7) days. azithromycin 500 mg Tab Commonly known as:  Mateus Trevor Take 1 Tab by mouth daily for 5 days. clopidogrel 75 mg Tab Commonly known as:  PLAVIX Take 1 Tab by mouth daily. cyanocobalamin 2,500 mcg sublingual tablet Commonly known as:  VITAMIN B12 Take 1 Tab by mouth daily. dicyclomine 20 mg tablet Commonly known as:  BENTYL Take 20 mg by mouth every six (6) hours. ergocalciferol 50,000 unit capsule Commonly known as:  ERGOCALCIFEROL Take 1 Cap by mouth every Sunday. fentaNYL 12 mcg/hr patch Commonly known as:  DURAGESIC  
1 Patch by TransDERmal route every seventy-two (72) hours. Max Daily Amount: 1 Patch. FLUoxetine 20 mg capsule Commonly known as:  PROzac Take 1 Cap by mouth daily. glipiZIDE 10 mg tablet Commonly known as:  GLUCOTROL  
TAKE ONE TABLET BY MOUTH TWICE DAILY HYDROcodone-acetaminophen 5-325 mg per tablet Commonly known as:  Ciaran Barbeau Take 1 Tab by mouth every eight (8) hours as needed. Max Daily Amount: 3 Tabs. insulin regular 100 unit/mL injection Commonly known as:  NOVOLIN R, HUMULIN R  
15 Units by SubCUTAneous route three (3) times daily. insulin syringe-needle U-100 Dispense ultrafine 0.5 mL syringes with 31 gauge needle  
  
 ipratropium 0.02 % nebulizer solution Commonly known as:  ATROVENT  
2.5 mL by Nebulization route every four (4) hours (while awake). Indications: PREVENTION OF BRONCHOSPASM WITH CHRONIC BRONCHITIS  
  
 lisinopril-hydroCHLOROthiazide 20-25 mg per tablet Commonly known as:  Lauraine Lass Take 1 Tab by mouth daily. metFORMIN 1,000 mg tablet Commonly known as:  GLUCOPHAGE  
TAKE ONE TABLET BY MOUTH TWICE DAILY WITH FOOD  
  
 metroNIDAZOLE 500 mg tablet Commonly known as:  FLAGYL Take 1 Tab by mouth two (2) times a day for 7 days. ondansetron hcl 4 mg tablet Commonly known as:  ZOFRAN (AS HYDROCHLORIDE) Take 1 Tab by mouth every eight (8) hours as needed for Nausea. pravastatin 40 mg tablet Commonly known as:  PRAVACHOL Take 1 Tab by mouth daily. umeclidinium 62.5 mcg/actuation inhaler Commonly known as:  INCRUSE ELLIPTA Take 1 Puff by inhalation daily. Prescriptions Sent to Pharmacy Refills metroNIDAZOLE (FLAGYL) 500 mg tablet 0 Sig: Take 1 Tab by mouth two (2) times a day for 7 days. Class: Normal  
 Pharmacy: [address], [facility] [address] Ph #: [phone] Route: Oral  
 azithromycin (ZITHROMAX) 500 mg tab 0 Sig: Take 1 Tab by mouth daily for 5 days. Class: Normal  
 Pharmacy: [address], [facility] [address] Ph #: [phone] Route: Oral  
  
To-Do List   
 04/18/2017 Microbiology:  C. DIFFICILE/EPI PCR   
  
 04/18/2017 Lab:  CBC W/O DIFF   
  
 04/18/2017 Imaging:  DUPLEX ABD VISC ART ORGANS COMPLETE   
  
 04/18/2017 Lab:  LIPASE   
  
 04/18/2017 Lab:  METABOLIC PANEL, COMPREHENSIVE Introducing hospitals & HEALTH SERVICES! Dear Ian Inch: 
Thank you for requesting a Berg account. Our records indicate that you already have an active Berg account. You can access your account anytime at [email or web address] Did you know that you can access your hospital and ER discharge instructions at any time in Berg? You can also review all of your test results from your hospital stay or ER visit. Additional Information If you have questions, please visit the Frequently Asked Questions section of the Berg website at [email or web address] Remember, Berg is NOT to be used for urgent needs. For medical emergencies, dial 911. Now available from your iPhone and Android! Please provide this summary of care documentation to your next provider. Your primary care clinician is listed as Norma Emanuel. If you have any questions after today's visit, please call [phone].

## 2017-04-18 NOTE — PROGRESS NOTES
Assessment/Plan:    1. Vomiting, intractability of vomiting not specified, presence of nausea not specified, unspecified vomiting type and diarrhea - ddx includes infectious (c diff, etc) vs mesenteric ischemia vs other. [facility] treat empirically for colitis with azithro and flagyl.  - METABOLIC PANEL, COMPREHENSIVE; Future  - LIPASE; Future  - DUPLEX ABD VISC ART ORGANS COMPLETE; Future  - C. DIFFICILE/EPI PCR ([facility] Only); Future  - DUPLEX ABD VISC ART ORGANS COMPLETE; Future  - metroNIDAZOLE (FLAGYL) 500 mg tablet; Take 1 Tab by mouth two (2) times a day for 7 days. Dispense: 14 Tab; Refill: 0  - ondansetron hcl (ZOFRAN, AS HYDROCHLORIDE,) 4 mg tablet; Take 1 Tab by mouth every eight (8) hours as needed for Nausea. Dispense: 60 Tab; Refill: 0      2. Left lower quadrant pain  - METABOLIC PANEL, COMPREHENSIVE; Future  - CBC W/O DIFF; Future  - C. DIFFICILE/EPI PCR ([facility] Only); Future  - DUPLEX ABD VISC ART ORGANS COMPLETE; Future      The plan was discussed with the patient. The patient verbalized understanding and is in agreement with the plan. All medication potential side effects were discussed with the patient.     Health Maintenance:   Health Maintenance   Topic Date Due    MEDICARE YEARLY EXAM  07/27/2017    HEMOGLOBIN A1C Q6M  09/28/2017    EYE EXAM RETINAL OR DILATED Q1  02/07/2018    LIPID PANEL Q1  02/19/2018    FOOT EXAM Q1  03/28/2018    MICROALBUMIN Q1  03/28/2018    BREAST CANCER SCRN MAMMOGRAM  08/11/2018    GLAUCOMA SCREENING Q2Y  02/07/2019    COLONOSCOPY  10/06/2019    DTaP/Tdap/Td series (2 - Td) 07/26/2026    Hepatitis C Screening  Completed    OSTEOPOROSIS SCREENING (DEXA)  Completed    ZOSTER VACCINE AGE 60>  Addressed    Pneumococcal 65+ Low/Medium Risk  Completed    INFLUENZA AGE 9 TO ADULT  Addressed       Tesas Soto is a 78 y.o. female and presents with Abdominal Pain     Subjective:  Pt presents with c/o continued nausea/vomiting x 1 mo. Had CT last month in ER, which didn't show anything acute. States she is only able to tolerate gingerale and dry toast.  Has periumbilical pain (previously described as generalized abd pain). +diarrhea (watery). States she will get cramping and diarrhea if she eats. ROS:  Constitutional: No recent weight change. No weakness/fatigue. No f/c. Cardiovascular: No CP/palpitations. No NINA/orthopnea/PND. Respiratory: No cough/sputum, dyspnea, wheezing. Gastointestinal: No dysphagia, reflux.  + n/v.  +diarrhea. No melena/rectal bleeding. The problem list was updated as a part of today's visit. Patient Active Problem List   Diagnosis Code    Gastroparesis K31.84    Vitamin D deficiency E55.9    Hx of breast cancer Z85.3    Chronic pain syndrome G89.4    Osteoarthrosis, unspecified whether generalized or localized, unspecified site C37.79    Diastolic congestive heart failure ([facility]) I50.30    COPD (chronic obstructive pulmonary disease) ([facility]) J44.9    GERD (gastroesophageal reflux disease) K21.9    Tobacco dependence F17.200    Chronic radicular lumbar pain M54.16, G89.29    Scoliosis M41.9    Essential hypertension I10    Pure hypercholesterolemia E78.00    Type II diabetes mellitus ([facility]) E11.9    CAD (coronary artery disease) I25.10    Spinal stenosis of lumbar region with neurogenic claudication M48.06    Lumbar spinal stenosis M48.06    S/P lumbar fusion Z98.1    Osteoporosis M81.0    Left hip pain M25.552    Lumbar compression fracture ([facility]) S32.000A    Hemianopsia H53.47    Occipital stroke ([facility]) I63.9    Reactive depression F32.9    Stenosis of left carotid artery I65.22    Carpal tunnel syndrome of right wrist G56.01       The PSH, FH were reviewed.     SH:  Social History   Substance Use Topics    Smoking status: Former Smoker     Packs/day: 0.50     Years: 35.00     Types: Cigarettes     Quit date: 2/20/2017    Smokeless tobacco: Never Used   Arabella March Alcohol use No       Medications/Allergies:  Current Outpatient Prescriptions on File Prior to Visit   Medication Sig Dispense Refill    metFORMIN (GLUCOPHAGE) 1,000 mg tablet TAKE ONE TABLET BY MOUTH TWICE DAILY WITH FOOD 180 Tab 0    clopidogrel (PLAVIX) 75 mg tab Take 1 Tab by mouth daily. 90 Tab 3    FLUoxetine (PROZAC) 20 mg capsule Take 1 Cap by mouth daily. 30 Cap 0    glipiZIDE (GLUCOTROL) 10 mg tablet TAKE ONE TABLET BY MOUTH TWICE DAILY 180 Tab 0    cyanocobalamin (VITAMIN B12) 2,500 mcg sublingual tablet Take 1 Tab by mouth daily. 100 Tab 3    ergocalciferol (ERGOCALCIFEROL) 50,000 unit capsule Take 1 Cap by mouth every Sunday. 4 Cap 3    HYDROcodone-acetaminophen (NORCO) 5-325 mg per tablet Take 1 Tab by mouth every eight (8) hours as needed. Max Daily Amount: 3 Tabs. 20 Tab 0    alendronate (FOSAMAX) 70 mg tablet Take 1 Tab by mouth every seven (7) days. 30 Tab 0    fentaNYL (DURAGESIC) 12 mcg/hr patch 1 Patch by TransDERmal route every seventy-two (72) hours. Max Daily Amount: 1 Patch. 10 Patch 0    insulin regular (NOVOLIN R, HUMULIN R) 100 unit/mL injection 15 Units by SubCUTAneous route three (3) times daily. 4 Vial 0    ipratropium (ATROVENT) 0.02 % nebulizer solution 2.5 mL by Nebulization route every four (4) hours (while awake). Indications: PREVENTION OF BRONCHOSPASM WITH CHRONIC BRONCHITIS 60 mL 1    albuterol (PROVENTIL HFA, VENTOLIN HFA, PROAIR HFA) 90 mcg/actuation inhaler Take 2 Puffs by inhalation every four (4) hours as needed for Wheezing. Indications: Chronic Obstructive Pulmonary Disease 1 Inhaler 1    umeclidinium (INCRUSE ELLIPTA) 62.5 mcg/actuation inhaler Take 1 Puff by inhalation daily. 1 Inhaler 1    lisinopril-hydrochlorothiazide (PRINZIDE, ZESTORETIC) 20-25 mg per tablet Take 1 Tab by mouth daily. 90 Tab 1    pravastatin (PRAVACHOL) 40 mg tablet Take 1 Tab by mouth daily.  90 Tab 1    insulin syringe-needle U-100 Dispense ultrafine 0.5 mL syringes with 31 gauge needle 100 Syringe 11    dicyclomine (BENTYL) 20 mg tablet Take 20 mg by mouth every six (6) hours. No current facility-administered medications on file prior to visit. Allergies   Allergen Reactions    Percocet [Oxycodone-Acetaminophen] Rash and Itching     Can Take Percocet but must take Benadryl for itching     Perfume Ht52 Rash     Perfume specific:  MUSK       Objective:  Visit Vitals    /60 (BP 1 Location: Right arm, BP Patient Position: Sitting)    Pulse (!) 110    Temp 97 °F (36.1 °C) (Temporal)    Resp 24    Ht 5' 1\" (1.549 m)    Wt 173 lb (78.5 kg)    SpO2 97%    BMI 32.69 kg/m2      Constitutional: Well developed, nourished, no distress, alert, obese habitus   HENT: Exterior ears and tympanic membranes normal bilaterally. Supple neck. No thyromegaly or lymphadenopathy. Oropharynx clear and moist mucous membranes. Eyes: Conjunctiva normal. PERRL. CV: S1, S2.  Reg, tachy. No murmurs/rubs. No thrills palpated. No carotid bruits. Intact distal pulses. No edema. Pulm: No abnormalities on inspection. Clear to auscultation bilaterally. No wheezing/rhonchi. Normal effort. GI: Soft, bilat lower quad tenderness, nondistended. Normal active bowel sounds.  No rebound or guarding     Labwork and Ancillary Studies:    CBC w/Diff  Lab Results   Component Value Date/Time    WBC 9.3 02/19/2017 05:01 AM    HGB 10.6 02/19/2017 05:01 AM    PLATELET 191 33/59/5077 05:01 AM         Basic Metabolic Profile/LFTs  Lab Results   Component Value Date/Time    Sodium 136 02/20/2017 05:39 AM    Potassium 3.8 02/20/2017 05:39 AM    Chloride 102 02/20/2017 05:39 AM    CO2 24 02/20/2017 05:39 AM    Anion gap 10 02/02/2017 07:58 AM    Glucose 209 02/20/2017 05:39 AM    BUN 20 02/20/2017 05:39 AM    Creatinine 0.9 02/20/2017 05:39 AM    BUN/Creatinine ratio 22 02/02/2017 07:58 AM    GFR est AA >60.0 02/20/2017 05:39 AM    GFR est non-AA >60 02/20/2017 05:39 AM    Calcium 8.3 02/20/2017 05:39 AM      Lab Results   Component Value Date/Time    ALT (SGPT) 22 02/19/2017 05:01 AM    AST (SGOT) 17 02/19/2017 05:01 AM    Alk.  phosphatase 136 02/19/2017 05:01 AM    Bilirubin, direct <0.1 04/28/2016 03:42 AM    Bilirubin, total 0.6 02/19/2017 05:01 AM       Cholesterol  Lab Results   Component Value Date/Time    Cholesterol, total 146 02/19/2017 05:01 AM    HDL Cholesterol 39 02/19/2017 05:01 AM    LDL, calculated 64 02/19/2017 05:01 AM    Triglyceride 216 02/19/2017 05:01 AM    CHOL/HDL Ratio 3.7 02/19/2017 05:01 AM

## 2017-04-18 NOTE — PROGRESS NOTES
Joanne Salgado is a 78 y.o. female  Chief Complaint   Patient presents with    Abdominal Pain     1. Have you been to the ER, urgent care clinic since your last visit? Hospitalized since your last visit? No    2. Have you seen or consulted any other health care providers outside of the [address] [place] [address] since your last visit? Include any pap smears or colon screening.  No

## 2017-04-19 NOTE — PROGRESS NOTES
Tell pt her labs show she is very dehydrated and her kidney function has declined because of it. She either needs to drink a lot more water (although I know she has nausea) and we can repeat labs next week. Or she should go to ER for IVF.

## 2017-04-24 PROBLEM — R11.10 VOMITING: Status: ACTIVE | Noted: 2017-04-24

## 2017-04-24 PROBLEM — R10.10 PAIN OF UPPER ABDOMEN: Status: ACTIVE | Noted: 2017-04-24

## 2017-04-25 ENCOUNTER — TELEPHONE (OUTPATIENT)
Dept: FAMILY MEDICINE CLINIC | Age: 67
End: 2017-04-25

## 2017-04-25 NOTE — TELEPHONE ENCOUNTER
Spoke with patient, read results to her again. Patient states she is still feeling terrible, still vomiting. Can not keep anything down. Please advise.

## 2017-04-25 NOTE — TELEPHONE ENCOUNTER
Pt called during lunch for her ultrasound results. I told her the email that Dr Fritz Herrera sent. She states that she is still sick even though she finished the antibiotics. She would like a call back. Please advise.

## 2017-04-26 DIAGNOSIS — R19.7 DIARRHEA, UNSPECIFIED TYPE: ICD-10-CM

## 2017-04-26 DIAGNOSIS — R10.32 LEFT LOWER QUADRANT PAIN: ICD-10-CM

## 2017-04-26 DIAGNOSIS — R11.10 VOMITING, INTRACTABILITY OF VOMITING NOT SPECIFIED, PRESENCE OF NAUSEA NOT SPECIFIED, UNSPECIFIED VOMITING TYPE: ICD-10-CM

## 2017-05-17 ENCOUNTER — OFFICE VISIT (OUTPATIENT)
Dept: ORTHOPEDIC SURGERY | Age: 67
End: 2017-05-17

## 2017-05-17 VITALS
TEMPERATURE: 98.1 F | HEIGHT: 61 IN | RESPIRATION RATE: 18 BRPM | WEIGHT: 176 LBS | BODY MASS INDEX: 33.23 KG/M2 | DIASTOLIC BLOOD PRESSURE: 88 MMHG | HEART RATE: 116 BPM | SYSTOLIC BLOOD PRESSURE: 136 MMHG

## 2017-05-17 DIAGNOSIS — S32.000A LUMBAR COMPRESSION FRACTURE, CLOSED, INITIAL ENCOUNTER (HCC): ICD-10-CM

## 2017-05-17 DIAGNOSIS — M48.062 SPINAL STENOSIS OF LUMBAR REGION WITH NEUROGENIC CLAUDICATION: Primary | ICD-10-CM

## 2017-05-17 RX ORDER — ONDANSETRON 4 MG/1
TABLET, ORALLY DISINTEGRATING ORAL
COMMUNITY
Start: 2017-04-01 | End: 2017-07-17 | Stop reason: ALTCHOICE

## 2017-05-17 RX ORDER — LIDOCAINE HYDROCHLORIDE 20 MG/ML
0.5 INJECTION, SOLUTION EPIDURAL; INFILTRATION; INTRACAUDAL; PERINEURAL ONCE
Qty: 0.5 ML | Refills: 0
Start: 2017-05-17 | End: 2017-05-17

## 2017-05-17 RX ORDER — IPRATROPIUM BROMIDE AND ALBUTEROL SULFATE 2.5; .5 MG/3ML; MG/3ML
3 SOLUTION RESPIRATORY (INHALATION)
COMMUNITY
End: 2017-07-17 | Stop reason: ALTCHOICE

## 2017-05-17 RX ORDER — BUPIVACAINE HYDROCHLORIDE 2.5 MG/ML
0.5 INJECTION, SOLUTION INFILTRATION; PERINEURAL ONCE
Qty: 0.5 ML | Refills: 0
Start: 2017-05-17 | End: 2017-05-17

## 2017-05-17 RX ORDER — BUPROPION HYDROCHLORIDE 75 MG/1
75 TABLET ORAL
COMMUNITY
End: 2017-07-17 | Stop reason: ALTCHOICE

## 2017-05-17 RX ORDER — HYDROCODONE BITARTRATE AND ACETAMINOPHEN 5; 325 MG/1; MG/1
1 TABLET ORAL
Qty: 60 TAB | Refills: 0 | Status: SHIPPED | OUTPATIENT
Start: 2017-05-17 | End: 2017-07-07 | Stop reason: SDUPTHER

## 2017-05-17 RX ORDER — TRAMADOL HYDROCHLORIDE 50 MG/1
TABLET ORAL
COMMUNITY
Start: 2017-04-01 | End: 2017-05-17

## 2017-05-17 RX ORDER — BETAMETHASONE SODIUM PHOSPHATE AND BETAMETHASONE ACETATE 3; 3 MG/ML; MG/ML
12 INJECTION, SUSPENSION INTRA-ARTICULAR; INTRALESIONAL; INTRAMUSCULAR; SOFT TISSUE ONCE
Qty: 2 ML | Refills: 0
Start: 2017-05-17 | End: 2017-05-17

## 2017-05-17 NOTE — PATIENT INSTRUCTIONS
Compression Fracture of the Spine: Care Instructions  Your Care Instructions    A compression fracture happens when the front part of a spinal bone breaks and collapses. A fall or other accident can cause it. A minor injury or moving the wrong way can cause a break if you have thin or brittle bones (osteoporosis). These types of breaks will heal in 8 to 10 weeks. You will need rest and pain medicines. Your doctor may recommend physical therapy. Some doctors recommend that certain people with compression fractures wear braces. Your doctor also may treat thin or brittle bones. You may need surgery if you have lasting pain or if the bone presses on the spinal cord or nerves. You heal best when you take good care of yourself. Eat a variety of healthy foods, and don't smoke. Follow-up care is a key part of your treatment and safety. Be sure to make and go to all appointments, and call your doctor if you are having problems. It's also a good idea to know your test results and keep a list of the medicines you take. How can you care for yourself at home? · Be safe with medicines. Read and follow all instructions on the label. ¨ If the doctor gave you a prescription medicine for pain, take it as prescribed. ¨ If you are not taking a prescription pain medicine, ask your doctor if you can take an over-the-counter medicine. · Talk to your doctor about how to make your bones stronger. You may need medicines or a change in what you eat. · Be active only as directed by your doctor. When should you call for help? Call 911 anytime you think you may need emergency care. For example, call if:  · You are unable to move an arm or a leg at all. Call your doctor now or seek immediate medical care if:  · You have new or worse symptoms in your arms, chest, legs, belly, or buttocks. Symptoms may include:  ¨ Numbness or tingling. ¨ Weakness. ¨ Pain. · You lose bladder or bowel control.   · You have belly pain, bloating, vomiting, or nausea. Watch closely for changes in your health, and be sure to contact your doctor if:  · You are not getting better as expected. Where can you learn more? Go to http://flores-kendell.info/. Enter P445 in the search box to learn more about \"Compression Fracture of the Spine: Care Instructions. \"  Current as of: August 10, 2016  Content Version: 11.2  © 9625-8094 [employer]. Care instructions adapted under license by [employer] (which disclaims liability or warranty for this information). If you have questions about a medical condition or this instruction, always ask your healthcare professional. [address] any warranty or liability for your use of this information.
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
## 2017-05-18 DIAGNOSIS — Z79.4 TYPE 2 DIABETES MELLITUS WITH DIABETIC AUTONOMIC NEUROPATHY, WITH LONG-TERM CURRENT USE OF INSULIN (HCC): ICD-10-CM

## 2017-05-18 DIAGNOSIS — E11.43 TYPE 2 DIABETES MELLITUS WITH DIABETIC AUTONOMIC NEUROPATHY, WITH LONG-TERM CURRENT USE OF INSULIN (HCC): ICD-10-CM

## 2017-05-18 RX ORDER — GLIPIZIDE 10 MG/1
TABLET ORAL
Qty: 180 TAB | Refills: 0 | Status: SHIPPED | OUTPATIENT
Start: 2017-05-18 | End: 2018-01-02 | Stop reason: SDUPTHER

## 2017-06-07 ENCOUNTER — TELEPHONE (OUTPATIENT)
Dept: FAMILY MEDICINE CLINIC | Age: 67
End: 2017-06-07

## 2017-06-07 NOTE — TELEPHONE ENCOUNTER
Patient called stating that Dr. Jami Bolaños mentioned during last visit that \"if her hearing hadn't improved to call in and get an consult to ENT\".  Please contact at [phone] if necessary

## 2017-06-26 DIAGNOSIS — I10 ESSENTIAL HYPERTENSION WITH GOAL BLOOD PRESSURE LESS THAN 140/90: ICD-10-CM

## 2017-06-26 RX ORDER — LISINOPRIL AND HYDROCHLOROTHIAZIDE 20; 25 MG/1; MG/1
TABLET ORAL
Qty: 30 TAB | Refills: 0 | Status: SHIPPED | OUTPATIENT
Start: 2017-06-26 | End: 2017-07-17 | Stop reason: ALTCHOICE

## 2017-07-07 ENCOUNTER — OFFICE VISIT (OUTPATIENT)
Dept: ORTHOPEDIC SURGERY | Age: 67
End: 2017-07-07

## 2017-07-07 VITALS
SYSTOLIC BLOOD PRESSURE: 115 MMHG | RESPIRATION RATE: 16 BRPM | HEART RATE: 97 BPM | DIASTOLIC BLOOD PRESSURE: 69 MMHG | TEMPERATURE: 98.3 F

## 2017-07-07 DIAGNOSIS — M81.0 OSTEOPOROSIS, UNSPECIFIED OSTEOPOROSIS TYPE, UNSPECIFIED PATHOLOGICAL FRACTURE PRESENCE: ICD-10-CM

## 2017-07-07 DIAGNOSIS — M48.062 SPINAL STENOSIS OF LUMBAR REGION WITH NEUROGENIC CLAUDICATION: Primary | ICD-10-CM

## 2017-07-07 DIAGNOSIS — Z79.899 MEDICATION MANAGEMENT: ICD-10-CM

## 2017-07-07 DIAGNOSIS — G89.4 CHRONIC PAIN SYNDROME: ICD-10-CM

## 2017-07-07 DIAGNOSIS — M53.3 SACRAL PAIN: ICD-10-CM

## 2017-07-07 RX ORDER — NALOXONE HYDROCHLORIDE 2 MG/.4ML
2 INJECTION, SOLUTION INTRAMUSCULAR; SUBCUTANEOUS
Qty: 2 DEVICE | Refills: 0 | Status: SHIPPED | OUTPATIENT
Start: 2017-07-07 | End: 2017-07-17 | Stop reason: ALTCHOICE

## 2017-07-07 RX ORDER — HYDROCODONE BITARTRATE AND ACETAMINOPHEN 5; 325 MG/1; MG/1
1 TABLET ORAL
Qty: 60 TAB | Refills: 0 | Status: SHIPPED | OUTPATIENT
Start: 2017-07-07 | End: 2018-01-09 | Stop reason: ALTCHOICE

## 2017-07-07 NOTE — PATIENT INSTRUCTIONS
Osteoporosis: Care Instructions  Your Care Instructions    Osteoporosis causes bones to become thin and weak. It is much more common in women than in men. Osteoporosis may be very advanced before you know you have it. Sometimes the first sign is a broken bone in the hip, spine, or wrist or sudden pain in your middle or lower back. Follow-up care is a key part of your treatment and safety. Be sure to make and go to all appointments, and call your doctor if you are having problems. It's also a good idea to know your test results and keep a list of the medicines you take. How can you care for yourself at home? · Your doctor may prescribe a bisphosphonate, such as risedronate (Actonel) or alendronate (Fosamax), for osteoporosis. If you are taking one of these medicines by mouth:  ¨ Take your medicine with a full glass of water when you first get up in the morning. ¨ Do not lie down, eat, drink a beverage, or take any other medicine for at least 30 minutes after taking the drug. This helps prevent stomach problems. ¨ Do not take your medicine late in the day if you forgot to take it in the morning. Skip it, and take the usual dose the next morning. ¨ If you have side effects, tell your doctor. He or she may prescribe another medicine. · Get enough calcium and vitamin D. The [employer] recommends adults younger than age 46 need 1,000 mg of calcium and 600 IU of vitamin D each day. Women ages 46 to 79 need 1,200 mg of calcium and 600 IU of vitamin D each day. Men ages 46 to 79 need 1,000 mg of calcium and 600 IU of vitamin D each day. Adults 71 and older need 1,200 mg of calcium and 800 IU of vitamin D each day. ¨ Eat foods rich in calcium, like yogurt, cheese, milk, and dark green vegetables. This is a good way to get the calcium you need. You can get vitamin D from eggs, fatty fish, cereal, and milk. ¨ Talk to your doctor about taking a calcium plus vitamin D supplement. Be careful, though. Adults ages 23 to 48 should not get more than 2,500 mg of calcium and 4,000 IU of vitamin D each day, whether it is from supplements and/or food. Adults ages 46 and older should not get more than 2,000 mg of calcium and 4,000 IU of vitamin D each day from supplements and/or food. · Limit alcohol to 2 drinks a day for men and 1 drink a day for women. Too much alcohol can cause health problems. · Do not smoke. Smoking puts you at a much higher risk for osteoporosis. If you need help quitting, talk to your doctor about stop-smoking programs and medicines. These can increase your chances of quitting for good. · Get regular bone-building exercise. Weight-bearing and resistance exercises keep bones healthy by working the muscles and bones against gravity. Start out at an exercise level that feels right for you. Add a little at a time until you can do the following:  ¨ Do 30 minutes of weight-bearing exercise on most days of the week. Walking, jogging, stair climbing, and dancing are good choices. ¨ Do resistance exercises with weights or elastic bands 2 to 3 days a week. · Reduce your risk of falls:  ¨ Wear supportive shoes with low heels and nonslip soles. ¨ Use a cane or walker, if you need it. Use shower chairs and bath benches. Put in handrails on stairways, around your shower or tub area, and near the toilet. ¨ Keep stairs, porches, and walkways well lit. Use night-lights. ¨ Remove throw rugs and other objects that are in the way. ¨ Avoid icy, wet, or slippery surfaces. ¨ Keep a cordless phone and a flashlight with new batteries by your bed. When should you call for help? Watch closely for changes in your health, and be sure to contact your doctor if you have any problems. Where can you learn more? Go to http://flores-kendell.info/. Enter K100 in the search box to learn more about \"Osteoporosis: Care Instructions. \"  Current as of: August 9, 2016  Content Version: 11.3  © 3018-0932 [employer]. Care instructions adapted under license by [employer] (which disclaims liability or warranty for this information). If you have questions about a medical condition or this instruction, always ask your healthcare professional. [address] any warranty or liability for your use of this information.
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
## 2017-07-07 NOTE — MR AVS SNAPSHOT
Visit Information Date & Time Provider Department Dept. Phone Encounter #  
 7/7/2017  9:30 AM Colleen Hu NP [facility] [phone] [id number] Follow-up Instructions Return in about 2 months (around 9/7/2017). Your Appointments 7/17/2017  7:45 AM  
Follow Up with Yancy Cadet MD  
[address]) Appt Note: f/u per mychart; r/s for later [address] [facility] [id number] [phone]  
  
   
 [address] [facility] [address] [facility] Upcoming Health Maintenance Date Due  
 MEDICARE YEARLY EXAM 7/27/2017 INFLUENZA AGE 9 TO ADULT 8/1/2017 HEMOGLOBIN A1C Q6M 9/28/2017 EYE EXAM RETINAL OR DILATED Q1 2/7/2018 LIPID PANEL Q1 2/19/2018 FOOT EXAM Q1 3/28/2018 MICROALBUMIN Q1 3/28/2018 BREAST CANCER SCRN MAMMOGRAM 8/11/2018 GLAUCOMA SCREENING Q2Y 2/7/2019 COLONOSCOPY 10/6/2019 DTaP/Tdap/Td series (2 - Td) 7/26/2026 Allergies as of 7/7/2017  Review Complete On: 7/7/2017 By: Colleen Hu NP Severity Noted Reaction Type Reactions Percocet [Oxycodone-acetaminophen]  10/08/2010    Rash, Itching Can Take Percocet but must take Benadryl for itching Perfume Ht52  02/05/2015    Rash Perfume specific:  MUSK Current Immunizations  Reviewed on 4/18/2016 Name Date Influenza High Dose Vaccine PF 10/19/2016  9:33 AM  
 Influenza Vaccine 10/19/2015  8:47 AM, 10/17/2013 Influenza Vaccine Whole 12/1/2008 Pneumococcal Polysaccharide (PPSV-23) 4/18/2016  1:50 PM  
 Pneumococcal Vaccine (Unspecified Type) 12/1/2008 Not reviewed this visit You Were Diagnosed With   
  
 Codes Comments Spinal stenosis of lumbar region with neurogenic claudication    -  Primary ICD-10-CM: M48.06 
ICD-9-CM: 724.03 Chronic pain syndrome     ICD-10-CM: G89.4 ICD-9-CM: 338.4 Medication management     ICD-10-CM: Z79.899 ICD-9-CM: V58.69 Osteoporosis, unspecified osteoporosis type, unspecified pathological fracture presence     ICD-10-CM: M81.0 ICD-9-CM: 733.00 Sacral pain     ICD-10-CM: M53.3 ICD-9-CM: 724.6 Vitals OB Status Smoking Status Hysterectomy Former Smoker Preferred Pharmacy Pharmacy Name Phone [phone] #  [phone] Your Updated Medication List  
  
   
This list is accurate as of: 7/7/17 11:42 AM.  Always use your most recent med list.  
  
  
  
  
 albuterol 90 mcg/actuation inhaler Commonly known as:  PROVENTIL HFA, VENTOLIN HFA, PROAIR HFA Take 2 Puffs by inhalation every four (4) hours as needed for Wheezing. Indications: Chronic Obstructive Pulmonary Disease  
  
 albuterol-ipratropium 2.5 mg-0.5 mg/3 ml Nebu Commonly known as:  DUO-NEB  
3 mL. alendronate 70 mg tablet Commonly known as:  FOSAMAX Take 1 Tab by mouth every seven (7) days. buPROPion 75 mg tablet Commonly known as:  WELLBUTRIN  
75 mg.  
  
 clopidogrel 75 mg Tab Commonly known as:  PLAVIX Take 1 Tab by mouth daily. cyanocobalamin 2,500 mcg sublingual tablet Commonly known as:  VITAMIN B12 Take 1 Tab by mouth daily. dicyclomine 20 mg tablet Commonly known as:  BENTYL Take 20 mg by mouth every six (6) hours. ergocalciferol 50,000 unit capsule Commonly known as:  ERGOCALCIFEROL Take 1 Cap by mouth every Sunday. fentaNYL 12 mcg/hr patch Commonly known as:  DURAGESIC  
1 Patch by TransDERmal route every seventy-two (72) hours. Max Daily Amount: 1 Patch. FLUoxetine 20 mg capsule Commonly known as:  PROzac Take 1 Cap by mouth daily. glipiZIDE 10 mg tablet Commonly known as:  GLUCOTROL  
TAKE ONE TABLET BY MOUTH TWICE DAILY HYDROcodone-acetaminophen 5-325 mg per tablet Commonly known as:  Jaycee Hogan  
 Take 1 Tab by mouth two (2) times daily as needed. Max Daily Amount: 2 Tabs. For chronic back pain  
  
 insulin regular 100 unit/mL injection Commonly known as:  NOVOLIN R, HUMULIN R  
15 Units by SubCUTAneous route three (3) times daily. insulin syringe-needle U-100 Dispense ultrafine 0.5 mL syringes with 31 gauge needle  
  
 ipratropium 0.02 % nebulizer solution Commonly known as:  ATROVENT  
2.5 mL by Nebulization route every four (4) hours (while awake). Indications: PREVENTION OF BRONCHOSPASM WITH CHRONIC BRONCHITIS * lisinopril-hydroCHLOROthiazide 20-25 mg per tablet Commonly known as:  Omega  Take 1 Tab by mouth daily. * lisinopril-hydroCHLOROthiazide 20-25 mg per tablet Commonly known as:  PRINZIDE, ZESTORETIC  
TAKE ONE TABLET BY MOUTH ONCE DAILY  
  
 metFORMIN 1,000 mg tablet Commonly known as:  GLUCOPHAGE  
TAKE ONE TABLET BY MOUTH TWICE DAILY WITH FOOD  
  
 naloxone 2 mg/0.4 mL auto-injector Commonly known as:  EVZIO  
0.4 mL by IntraMUSCular route once as needed for Overdose for up to 1 dose. ondansetron 4 mg disintegrating tablet Commonly known as:  ZOFRAN ODT  
  
 ondansetron hcl 4 mg tablet Commonly known as:  ZOFRAN (AS HYDROCHLORIDE) Take 1 Tab by mouth every eight (8) hours as needed for Nausea. pravastatin 40 mg tablet Commonly known as:  PRAVACHOL  
TAKE ONE TABLET BY MOUTH ONCE DAILY  
  
 umeclidinium 62.5 mcg/actuation inhaler Commonly known as:  INCRUSE ELLIPTA Take 1 Puff by inhalation daily. * Notice: This list has 2 medication(s) that are the same as other medications prescribed for you. Read the directions carefully, and ask your doctor or other care provider to review them with you. Prescriptions Printed Refills HYDROcodone-acetaminophen (NORCO) 5-325 mg per tablet 0 Sig: Take 1 Tab by mouth two (2) times daily as needed. Max Daily Amount: 2 Tabs. For chronic back pain Class: Print Route: Oral  
  
Prescriptions Sent to Pharmacy Refills  
 naloxone (EVZIO) 2 mg/0.4 mL auto-injector 0 Si.4 mL by IntraMUSCular route once as needed for Overdose for up to 1 dose. Class: Normal  
 Pharmacy: [address] [place] [address] # [id number]  #: [phone] Route: IntraMUSCular We Performed the Following AMB POC XRAY, SPINE, LUMBOSACRAL; 4+ [id number] CPT(R)] Follow-up Instructions Return in about 2 months (around 2017). To-Do List   
 2017 Lab:  DRUG SCREEN UR - W/ CONFIRM   
  
 2017 Imaging:  MRI LUMB SPINE W WO CONT   
  
 2017 Imaging:  MRI PELV WO CONT Patient Instructions Osteoporosis: Care Instructions Your Care Instructions Osteoporosis causes bones to become thin and weak. It is much more common in women than in men. Osteoporosis may be very advanced before you know you have it. Sometimes the first sign is a broken bone in the hip, spine, or wrist or sudden pain in your middle or lower back. Follow-up care is a key part of your treatment and safety. Be sure to make and go to all appointments, and call your doctor if you are having problems. It's also a good idea to know your test results and keep a list of the medicines you take. How can you care for yourself at home? · Your doctor may prescribe a bisphosphonate, such as risedronate (Actonel) or alendronate (Fosamax), for osteoporosis. If you are taking one of these medicines by mouth: 
¨ Take your medicine with a full glass of water when you first get up in the morning. ¨ Do not lie down, eat, drink a beverage, or take any other medicine for at least 30 minutes after taking the drug. This helps prevent stomach problems. ¨ Do not take your medicine late in the day if you forgot to take it in the morning. Skip it, and take the usual dose the next morning. ¨ If you have side effects, tell your doctor. He or she may prescribe another medicine. · Get enough calcium and vitamin D. The [place] of Medicine recommends adults younger than age 46 need 1,000 mg of calcium and 600 IU of vitamin D each day. Women ages 46 to 79 need 1,200 mg of calcium and 600 IU of vitamin D each day. Men ages 46 to 79 need 1,000 mg of calcium and 600 IU of vitamin D each day. Adults 71 and older need 1,200 mg of calcium and 800 IU of vitamin D each day. ¨ Eat foods rich in calcium, like yogurt, cheese, milk, and dark green vegetables. This is a good way to get the calcium you need. You can get vitamin D from eggs, fatty fish, cereal, and milk. ¨ Talk to your doctor about taking a calcium plus vitamin D supplement. Be careful, though. Adults ages 23 to 48 should not get more than 2,500 mg of calcium and 4,000 IU of vitamin D each day, whether it is from supplements and/or food. Adults ages 46 and older should not get more than 2,000 mg of calcium and 4,000 IU of vitamin D each day from supplements and/or food. · Limit alcohol to 2 drinks a day for men and 1 drink a day for women. Too much alcohol can cause health problems. · Do not smoke. Smoking puts you at a much higher risk for osteoporosis. If you need help quitting, talk to your doctor about stop-smoking programs and medicines. These can increase your chances of quitting for good. · Get regular bone-building exercise. Weight-bearing and resistance exercises keep bones healthy by working the muscles and bones against gravity. Start out at an exercise level that feels right for you. Add a little at a time until you can do the following: ¨ Do 30 minutes of weight-bearing exercise on most days of the week. Walking, jogging, stair climbing, and dancing are good choices. ¨ Do resistance exercises with weights or elastic bands 2 to 3 days a week. · Reduce your risk of falls: 
¨ Wear supportive shoes with low heels and nonslip soles. ¨ Use a cane or walker, if you need it. Use shower chairs and bath benches. Put in handrails on stairways, around your shower or tub area, and near the toilet. ¨ Keep stairs, porches, and walkways well lit. Use night-lights. ¨ Remove throw rugs and other objects that are in the way. ¨ Avoid icy, wet, or slippery surfaces. ¨ Keep a cordless phone and a flashlight with new batteries by your bed. When should you call for help? Watch closely for changes in your health, and be sure to contact your doctor if you have any problems. Where can you learn more? Go to http://flores-kendell.info/. Enter K100 in the search box to learn more about \"Osteoporosis: Care Instructions. \" Current as of: August 9, 2016 Content Version: 11.3 © 7653-3054 [employer]. Care instructions adapted under license by [employer] (which disclaims liability or warranty for this information). If you have questions about a medical condition or this instruction, always ask your healthcare professional. [place] [address] any warranty or liability for your use of this information. Introducing [facility]! Dear Martin Fair: 
Thank you for requesting a [facility] account. Our records indicate that you already have an active [employer] account. You can access your account anytime at [email or web address] Did you know that you can access your hospital and ER discharge instructions at any time in [facility]? You can also review all of your test results from your hospital stay or ER visit. Additional Information If you have questions, please visit the Frequently Asked Questions section of the [employer] website at [email or web address] Remember, [facility] is NOT to be used for urgent needs. For medical emergencies, dial 911. Now available from your iPhone and Android! Please provide this summary of care documentation to your next provider. Your primary care clinician is listed as Norma Emanuel. If you have any questions after today's visit, please call [phone].

## 2017-07-14 ENCOUNTER — HOSPITAL ENCOUNTER (OUTPATIENT)
Dept: MRI IMAGING | Age: 67
Discharge: HOME | End: 2017-07-14
Attending: NURSE PRACTITIONER
Payer: MEDICARE

## 2017-07-14 VITALS — WEIGHT: 175 LBS | BODY MASS INDEX: 33.07 KG/M2

## 2017-07-14 DIAGNOSIS — M48.062 SPINAL STENOSIS OF LUMBAR REGION WITH NEUROGENIC CLAUDICATION: ICD-10-CM

## 2017-07-14 DIAGNOSIS — M81.0 OSTEOPOROSIS, UNSPECIFIED OSTEOPOROSIS TYPE, UNSPECIFIED PATHOLOGICAL FRACTURE PRESENCE: ICD-10-CM

## 2017-07-14 DIAGNOSIS — M53.3 SACRAL PAIN: ICD-10-CM

## 2017-07-14 LAB — CREAT UR-MCNC: 1 MG/DL (ref 0.6–1.3)

## 2017-07-14 PROCEDURE — 72158 MRI LUMBAR SPINE W/O & W/DYE: CPT

## 2017-07-14 PROCEDURE — 74011250636 HC RX REV CODE- 250/636: Performed by: NURSE PRACTITIONER

## 2017-07-14 PROCEDURE — A9585 GADOBUTROL INJECTION: HCPCS | Performed by: NURSE PRACTITIONER

## 2017-07-14 PROCEDURE — 82565 ASSAY OF CREATININE: CPT

## 2017-07-14 PROCEDURE — 72195 MRI PELVIS W/O DYE: CPT

## 2017-07-14 RX ADMIN — GADOBUTROL 7.5 ML: 604.72 INJECTION INTRAVENOUS at 13:28

## 2017-07-17 ENCOUNTER — TELEPHONE (OUTPATIENT)
Dept: FAMILY MEDICINE CLINIC | Age: 67
End: 2017-07-17

## 2017-07-17 ENCOUNTER — OFFICE VISIT (OUTPATIENT)
Dept: FAMILY MEDICINE CLINIC | Age: 67
End: 2017-07-17

## 2017-07-17 VITALS
RESPIRATION RATE: 18 BRPM | SYSTOLIC BLOOD PRESSURE: 110 MMHG | BODY MASS INDEX: 33.23 KG/M2 | DIASTOLIC BLOOD PRESSURE: 64 MMHG | WEIGHT: 176 LBS | HEART RATE: 97 BPM | HEIGHT: 61 IN | TEMPERATURE: 98.3 F | OXYGEN SATURATION: 97 %

## 2017-07-17 DIAGNOSIS — Z13.39 SCREENING FOR ALCOHOLISM: ICD-10-CM

## 2017-07-17 DIAGNOSIS — Z79.4 TYPE 2 DIABETES MELLITUS WITH OTHER NEUROLOGIC COMPLICATION, WITH LONG-TERM CURRENT USE OF INSULIN (HCC): Primary | ICD-10-CM

## 2017-07-17 DIAGNOSIS — Z12.31 ENCOUNTER FOR SCREENING MAMMOGRAM FOR HIGH-RISK PATIENT: ICD-10-CM

## 2017-07-17 DIAGNOSIS — I10 ESSENTIAL HYPERTENSION: ICD-10-CM

## 2017-07-17 DIAGNOSIS — Z12.39 SCREENING FOR BREAST CANCER: ICD-10-CM

## 2017-07-17 DIAGNOSIS — E11.49 TYPE 2 DIABETES MELLITUS WITH OTHER NEUROLOGIC COMPLICATION, WITH LONG-TERM CURRENT USE OF INSULIN (HCC): Primary | ICD-10-CM

## 2017-07-17 DIAGNOSIS — Z00.00 ROUTINE GENERAL MEDICAL EXAMINATION AT A HEALTH CARE FACILITY: ICD-10-CM

## 2017-07-17 DIAGNOSIS — I25.10 CORONARY ARTERY DISEASE INVOLVING NATIVE HEART WITHOUT ANGINA PECTORIS, UNSPECIFIED VESSEL OR LESION TYPE: ICD-10-CM

## 2017-07-17 PROBLEM — Z79.899 MEDICATION MANAGEMENT: Status: RESOLVED | Noted: 2017-07-07 | Resolved: 2017-07-17

## 2017-07-17 RX ORDER — CARVEDILOL 3.12 MG/1
3.12 TABLET ORAL 2 TIMES DAILY WITH MEALS
Qty: 180 TAB | Refills: 0 | Status: SHIPPED | OUTPATIENT
Start: 2017-07-17 | End: 2017-10-16 | Stop reason: SDUPTHER

## 2017-07-17 RX ORDER — LISINOPRIL 10 MG/1
10 TABLET ORAL DAILY
Qty: 90 TAB | Refills: 0 | Status: SHIPPED | OUTPATIENT
Start: 2017-07-17 | End: 2017-10-16 | Stop reason: SDUPTHER
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
## 2017-07-17 NOTE — TELEPHONE ENCOUNTER
Pt called to ask if we had received her mri redults. When she called we had just received it about 5 mins ago. Please review her results and give her a call back with the results at earliest convenience.

## 2017-07-17 NOTE — MR AVS SNAPSHOT
Visit Information Date & Time Provider Department Dept. Phone Encounter #  
 7/17/2017  7:45 AM Nicolasa Calvillo, 3 [facility] [phone] [id number] Your Appointments 8/31/2017  1:15 PM  
DIAG TEST F/U with Emani Dorman MD  
[facility] ([facility]) Appt Note: MRI F/U [facility] DISC  
 [address] [facility] [address]  
[phone]  
  
   
 [address] [facility] [address] Upcoming Health Maintenance Date Due INFLUENZA AGE 9 TO ADULT 8/1/2017 HEMOGLOBIN A1C Q6M 9/28/2017 EYE EXAM RETINAL OR DILATED Q1 2/7/2018 LIPID PANEL Q1 2/19/2018 FOOT EXAM Q1 3/28/2018 MICROALBUMIN Q1 3/28/2018 MEDICARE YEARLY EXAM 7/18/2018 BREAST CANCER SCRN MAMMOGRAM 8/11/2018 GLAUCOMA SCREENING Q2Y 2/7/2019 COLONOSCOPY 10/6/2019 DTaP/Tdap/Td series (2 - Td) 7/26/2026 Allergies as of 7/17/2017  Review Complete On: 7/17/2017 By: Nicolasa Calvillo MD  
  
 Severity Noted Reaction Type Reactions Percocet [Oxycodone-acetaminophen]  10/08/2010    Rash, Itching Can Take Percocet but must take Benadryl for itching Perfume Ht52  02/05/2015    Rash Perfume specific:  MUSK Current Immunizations  Reviewed on 4/18/2016 Name Date Influenza High Dose Vaccine PF 10/19/2016  9:33 AM  
 Influenza Vaccine 10/19/2015  8:47 AM, 10/17/2013 Influenza Vaccine Whole 12/1/2008 Pneumococcal Polysaccharide (PPSV-23) 4/18/2016  1:50 PM  
 Pneumococcal Vaccine (Unspecified Type) 12/1/2008 Not reviewed this visit You Were Diagnosed With   
  
 Codes Comments Type 2 diabetes mellitus with other neurologic complication, with long-term current use of insulin (HCC)    -  Primary ICD-10-CM: E11.49, Z79.4 ICD-9-CM: 250.60, V58.67 Essential hypertension     ICD-10-CM: I10 
ICD-9-CM: 401.9  Coronary artery disease involving native heart without angina pectoris, unspecified vessel or lesion type     ICD-10-CM: I25.10 ICD-9-CM: 414.01 Screening for alcoholism     ICD-10-CM: Z13.89 ICD-9-CM: V79.1 Routine general medical examination at a health care facility     ICD-10-CM: Z00.00 ICD-9-CM: V70.0 Screening for breast cancer     ICD-10-CM: Z12.39 
ICD-9-CM: V76.10 Encounter for screening mammogram for high-risk patient     ICD-10-CM: Z12.31 
ICD-9-CM: V76.11 Vitals BP Pulse Temp Resp Height(growth percentile) Weight(growth percentile) 110/64 97 98.3 °F (36.8 °C) 18 5' 1\" (1.549 m) 176 lb (79.8 kg) SpO2 BMI OB Status Smoking Status 97% 33.25 kg/m2 Hysterectomy Current Some Day Smoker Vitals History BMI and BSA Data Body Mass Index Body Surface Area  
 33.25 kg/m 2 1.85 m 2 Preferred Pharmacy Pharmacy Name Phone [place] [id number] [address] [phone] Your Updated Medication List  
  
   
This list is accurate as of: 7/17/17  8:05 AM.  Always use your most recent med list.  
  
  
  
  
 albuterol 90 mcg/actuation inhaler Commonly known as:  PROVENTIL HFA, VENTOLIN HFA, PROAIR HFA Take 2 Puffs by inhalation every four (4) hours as needed for Wheezing. Indications: Chronic Obstructive Pulmonary Disease  
  
 alendronate 70 mg tablet Commonly known as:  FOSAMAX Take 1 Tab by mouth every seven (7) days. carvedilol 3.125 mg tablet Commonly known as:  Lobo Beets Take 1 Tab by mouth two (2) times daily (with meals). clopidogrel 75 mg Tab Commonly known as:  PLAVIX Take 1 Tab by mouth daily. cyanocobalamin 2,500 mcg sublingual tablet Commonly known as:  VITAMIN B12 Take 1 Tab by mouth daily. dicyclomine 20 mg tablet Commonly known as:  BENTYL Take 20 mg by mouth every six (6) hours. ergocalciferol 50,000 unit capsule Commonly known as:  ERGOCALCIFEROL Take 1 Cap by mouth every Sunday. glipiZIDE 10 mg tablet Commonly known as:  GLUCOTROL  
TAKE ONE TABLET BY MOUTH TWICE DAILY HYDROcodone-acetaminophen 5-325 mg per tablet Commonly known as:  Gia Dieter Take 1 Tab by mouth two (2) times daily as needed. Max Daily Amount: 2 Tabs. For chronic back pain  
  
 insulin regular 100 unit/mL injection Commonly known as:  NOVOLIN R, HUMULIN R  
15 Units by SubCUTAneous route three (3) times daily. insulin syringe-needle U-100 Dispense ultrafine 0.5 mL syringes with 31 gauge needle  
  
 ipratropium 0.02 % nebulizer solution Commonly known as:  ATROVENT  
2.5 mL by Nebulization route every four (4) hours (while awake). Indications: PREVENTION OF BRONCHOSPASM WITH CHRONIC BRONCHITIS  
  
 lisinopril 10 mg tablet Commonly known as:  Priscilla Flight Take 1 Tab by mouth daily. metFORMIN 1,000 mg tablet Commonly known as:  GLUCOPHAGE  
TAKE ONE TABLET BY MOUTH TWICE DAILY WITH FOOD  
  
 pravastatin 40 mg tablet Commonly known as:  PRAVACHOL  
TAKE ONE TABLET BY MOUTH ONCE DAILY  
  
 umeclidinium 62.5 mcg/actuation inhaler Commonly known as:  INCRUSE ELLIPTA Take 1 Puff by inhalation daily. Prescriptions Sent to Pharmacy Refills  
 carvedilol (COREG) 3.125 mg tablet 0 Sig: Take 1 Tab by mouth two (2) times daily (with meals). Class: Normal  
 Pharmacy: [facility] [place] [address] Ph #: [phone] Route: Oral  
 lisinopril (PRINIVIL, ZESTRIL) 10 mg tablet 0 Sig: Take 1 Tab by mouth daily. Class: Normal  
 Pharmacy: [facility] [place] [address] Ph #: [phone] Route: Oral  
  
To-Do List   
 07/17/2017 Imaging:  [facility] MAMMO BI SCREENING INCL CAD Patient Instructions Medicare Part B Preventive Services Limitations Recommendation Bone Mass Measurement 
(age 72 & older, biennial) Requires diagnosis related to osteoporosis or estrogen deficiency. Biennial benefit unless patient has history of long-term glucocorticoid tx or baseline is needed because initial test was by other method Due 2019 Cardiovascular Screening Blood Tests (every 5 years) Total cholesterol, HDL, Triglycerides Order as a panel if possible Due 9/2017 Colorectal Cancer Screening 
-Fecal occult blood test (annual) -Flexible sigmoidoscopy (5y) 
-Screening colonoscopy (10y) -Barium Enema  Due 2019 Diabetes Screening Tests (at least every 3 years, Medicare covers annually or at 6-month intervals for prediabetic patients) Fasting blood sugar (FBS) or glucose tolerance test (GTT) Patient must be diagnosed with one of the following: 
-Hypertension, Dyslipidemia, obesity, previous impaired FBS or GTT 
Or any two of the following: overweight, FH of diabetes, age ? 72, history of gestational diabetes, birth of baby weighing more than 9 pounds Due 9/2017 [facility] SERVICES! Dear Lizeth Benjamin: 
Thank you for requesting a [employer] account. Our records indicate that you already have an active [employer] account. You can access your account anytime at [email or web address] Did you know that you can access your hospital and ER discharge instructions at any time in [employer]? You can also review all of your test results from your hospital stay or ER visit. Additional Information If you have questions, please visit the Frequently Asked Questions section of the [employer] website at [email or web address] Remember, [employer] is NOT to be used for urgent needs. For medical emergencies, dial 911. Now available from your iPhone and Android! Please provide this summary of care documentation to your next provider. Your primary care clinician is listed as Norma Emanuel. If you have any questions after today's visit, please call [phone].

## 2017-07-17 NOTE — PROGRESS NOTES
Nathalia Laguna is a 78 y.o. female here today for follow-up. 1. Have you been to the ER, urgent care clinic since your last visit? Hospitalized since your last visit? Yes Reason for visit: july 7,2017, Jessica Pate, allergic reaction    2. Have you seen or consulted any other health care providers outside of the [address] [place] [address] since your last visit? Include any pap smears or colon screening.  No

## 2017-07-17 NOTE — TELEPHONE ENCOUNTER
Tell pt the mri showed some hip arthritis and tendonitis that may be causing her pain.   She needs to f/u with ortho, though, since they ordered the test.

## 2017-07-17 NOTE — PATIENT INSTRUCTIONS
Medicare Part B Preventive Services Limitations Recommendation   Bone Mass Measurement  (age 72 & older, biennial) Requires diagnosis related to osteoporosis or estrogen deficiency. Biennial benefit unless patient has history of long-term glucocorticoid tx or baseline is needed because initial test was by other method Due 2019   Cardiovascular Screening Blood Tests (every 5 years)  Total cholesterol, HDL, Triglycerides Order as a panel if possible Due 9/2017   Colorectal Cancer Screening  -Fecal occult blood test (annual)  -Flexible sigmoidoscopy (5y)  -Screening colonoscopy (10y)  -Barium Enema  Due 2019   Diabetes Screening Tests (at least every 3 years, Medicare covers annually or at 6-month intervals for prediabetic patients)    Fasting blood sugar (FBS) or glucose tolerance test (GTT) Patient must be diagnosed with one of the following:  -Hypertension, Dyslipidemia, obesity, previous impaired FBS or GTT  Or any two of the following: overweight, FH of diabetes, age ? 72, history of gestational diabetes, birth of baby weighing more than 9 pounds Due 9/2017

## 2017-07-21 DIAGNOSIS — E78.00 PURE HYPERCHOLESTEROLEMIA: ICD-10-CM

## 2017-07-24 RX ORDER — PRAVASTATIN SODIUM 40 MG/1
TABLET ORAL
Qty: 90 TAB | Refills: 2 | Status: SHIPPED | OUTPATIENT
Start: 2017-07-24 | End: 2017-09-26 | Stop reason: ALTCHOICE

## 2017-08-11 ENCOUNTER — HOSPITAL ENCOUNTER (OUTPATIENT)
Dept: MAMMOGRAPHY | Age: 67
Discharge: HOME | End: 2017-08-11
Attending: INTERNAL MEDICINE
Payer: MEDICARE

## 2017-08-11 DIAGNOSIS — Z12.39 SCREENING FOR BREAST CANCER: ICD-10-CM

## 2017-08-11 DIAGNOSIS — Z12.31 ENCOUNTER FOR SCREENING MAMMOGRAM FOR HIGH-RISK PATIENT: ICD-10-CM

## 2017-08-11 PROCEDURE — 77063 BREAST TOMOSYNTHESIS BI: CPT

## 2017-08-16 ENCOUNTER — TELEPHONE (OUTPATIENT)
Dept: FAMILY MEDICINE CLINIC | Age: 67
End: 2017-08-16

## 2017-08-16 NOTE — TELEPHONE ENCOUNTER
Zuleika Villalobos from [facility] called stating he received a very vague message about this pt yesterday. He states it might have been about a pre surgical clearance in which case the the pt has the paperwork.     Zuleika Villalobos can be reached at [phone]

## 2017-08-21 ENCOUNTER — OFFICE VISIT (OUTPATIENT)
Dept: FAMILY MEDICINE CLINIC | Age: 67
End: 2017-08-21

## 2017-08-21 VITALS
HEART RATE: 94 BPM | RESPIRATION RATE: 20 BRPM | WEIGHT: 172 LBS | DIASTOLIC BLOOD PRESSURE: 90 MMHG | BODY MASS INDEX: 32.47 KG/M2 | HEIGHT: 61 IN | OXYGEN SATURATION: 98 % | TEMPERATURE: 98.2 F | SYSTOLIC BLOOD PRESSURE: 140 MMHG

## 2017-08-21 DIAGNOSIS — I10 ESSENTIAL HYPERTENSION: ICD-10-CM

## 2017-08-21 DIAGNOSIS — Z23 ENCOUNTER FOR IMMUNIZATION: ICD-10-CM

## 2017-08-21 DIAGNOSIS — I25.10 CORONARY ARTERY DISEASE INVOLVING NATIVE HEART WITHOUT ANGINA PECTORIS, UNSPECIFIED VESSEL OR LESION TYPE: ICD-10-CM

## 2017-08-21 DIAGNOSIS — Z01.810 PREOP CARDIOVASCULAR EXAM: ICD-10-CM

## 2017-08-21 DIAGNOSIS — G89.29 CHRONIC RADICULAR LUMBAR PAIN: ICD-10-CM

## 2017-08-21 DIAGNOSIS — S32.000A LUMBAR COMPRESSION FRACTURE, CLOSED, INITIAL ENCOUNTER (HCC): ICD-10-CM

## 2017-08-21 DIAGNOSIS — Z79.4 TYPE 2 DIABETES MELLITUS WITH OTHER NEUROLOGIC COMPLICATION, WITH LONG-TERM CURRENT USE OF INSULIN (HCC): Primary | ICD-10-CM

## 2017-08-21 DIAGNOSIS — E11.49 TYPE 2 DIABETES MELLITUS WITH OTHER NEUROLOGIC COMPLICATION, WITH LONG-TERM CURRENT USE OF INSULIN (HCC): Primary | ICD-10-CM

## 2017-08-21 DIAGNOSIS — J41.0 SIMPLE CHRONIC BRONCHITIS (HCC): ICD-10-CM

## 2017-08-21 DIAGNOSIS — M54.16 CHRONIC RADICULAR LUMBAR PAIN: ICD-10-CM

## 2017-08-21 RX ORDER — PREDNISONE 10 MG/1
TABLET ORAL
Qty: 21 TAB | Refills: 0 | Status: SHIPPED | OUTPATIENT
Start: 2017-08-21 | End: 2017-09-26 | Stop reason: ALTCHOICE
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
## 2017-08-21 NOTE — MR AVS SNAPSHOT
Visit Information Date & Time Provider Department Dept. Phone Encounter #  
 8/21/2017 11:30 AM Shaw Cool, Ana Cristina [facility] [phone] [id number] Your Appointments 8/21/2017 11:30 AM  
SAME DAY with Shaw Cool MD  
[employer] [address]) Appt Note: pre op cataract [address] ok to double book per Dr Leoncio Cavazos [address] [facility] [address] [phone]  
  
   
 [address] [facility]  
  
    
 8/31/2017  1:15 PM  
DIAG TEST F/U with Justin Downs MD  
[facility] ONE ([address] [facility]) Appt Note: MRI F/U [facility] DISC  
 [address] [facility] [address] [phone]  
  
   
 [address] [facility] [address] Upcoming Health Maintenance Date Due HEMOGLOBIN A1C Q6M 9/28/2017 EYE EXAM RETINAL OR DILATED Q1 2/7/2018 LIPID PANEL Q1 2/19/2018 FOOT EXAM Q1 3/28/2018 MICROALBUMIN Q1 3/28/2018 MEDICARE YEARLY EXAM 7/18/2018 GLAUCOMA SCREENING Q2Y 2/7/2019 BREAST CANCER SCRN MAMMOGRAM 8/11/2019 COLONOSCOPY 10/6/2019 DTaP/Tdap/Td series (2 - Td) 7/26/2026 Allergies as of 8/21/2017  Review Complete On: 8/21/2017 By: Shaw Cool MD  
  
 Severity Noted Reaction Type Reactions Percocet [Oxycodone-acetaminophen]  10/08/2010    Rash, Itching Can Take Percocet but must take Benadryl for itching Perfume Ht52  02/05/2015    Rash Perfume specific:  MUSK Current Immunizations  Reviewed on 4/18/2016 Name Date Influenza High Dose Vaccine PF 10/19/2016  9:33 AM  
 Influenza Vaccine 10/19/2015  8:47 AM, 10/17/2013 Influenza Vaccine Whole 12/1/2008 Pneumococcal Polysaccharide (PPSV-23) 4/18/2016  1:50 PM  
 ZZZ-RETIRED (DO NOT USE) Pneumococcal Vaccine (Unspecified Type) 12/1/2008 Not reviewed this visit You Were Diagnosed With   
  
 Codes Comments Type 2 diabetes mellitus with other neurologic complication, with long-term current use of insulin (HCC)    -  Primary ICD-10-CM: E11.49, Z79.4 ICD-9-CM: 250.60, V58.67 Essential hypertension     ICD-10-CM: I10 
ICD-9-CM: 401.9 Coronary artery disease involving native heart without angina pectoris, unspecified vessel or lesion type     ICD-10-CM: I25.10 ICD-9-CM: 414.01 Chronic radicular lumbar pain     ICD-10-CM: M54.16, G89.29 ICD-9-CM: 724.4, 338.29 Vitals BP Pulse Temp Resp Height(growth percentile) Weight(growth percentile) (!) 148/100 94 98.2 °F (36.8 °C) 20 5' 1\" (1.549 m) 172 lb (78 kg) SpO2 BMI OB Status Smoking Status 98% 32.5 kg/m2 Hysterectomy Current Some Day Smoker Vitals History BMI and BSA Data Body Mass Index Body Surface Area 32.5 kg/m 2 1.83 m 2 [facility] [address] [phone] Your Updated Medication List  
  
   
This list is accurate as of: 8/21/17 11:25 AM.  Always use your most recent med list.  
  
  
  
  
 albuterol 90 mcg/actuation inhaler Commonly known as:  PROVENTIL HFA, VENTOLIN HFA, PROAIR HFA Take 2 Puffs by inhalation every four (4) hours as needed for Wheezing. Indications: Chronic Obstructive Pulmonary Disease  
  
 alendronate 70 mg tablet Commonly known as:  FOSAMAX Take 1 Tab by mouth every seven (7) days. carvedilol 3.125 mg tablet Commonly known as:  Carrolyn Peabody Take 1 Tab by mouth two (2) times daily (with meals). clopidogrel 75 mg Tab Commonly known as:  PLAVIX Take 1 Tab by mouth daily. cyanocobalamin 2,500 mcg sublingual tablet Commonly known as:  VITAMIN B12 Take 1 Tab by mouth daily. dicyclomine 20 mg tablet Commonly known as:  BENTYL Take 20 mg by mouth every six (6) hours. ergocalciferol 50,000 unit capsule Commonly known as:  ERGOCALCIFEROL Take 1 Cap by mouth every Sunday. glipiZIDE 10 mg tablet Commonly known as:  GLUCOTROL  
TAKE ONE TABLET BY MOUTH TWICE DAILY HYDROcodone-acetaminophen 5-325 mg per tablet Commonly known as:  Angela Portis Take 1 Tab by mouth two (2) times daily as needed. Max Daily Amount: 2 Tabs. For chronic back pain  
  
 insulin regular 100 unit/mL injection Commonly known as:  NOVOLIN R, HUMULIN R  
15 Units by SubCUTAneous route three (3) times daily. insulin syringe-needle U-100 Dispense ultrafine 0.5 mL syringes with 31 gauge needle  
  
 ipratropium 0.02 % Soln Commonly known as:  ATROVENT  
2.5 mL by Nebulization route every four (4) hours (while awake). Indications: PREVENTION OF BRONCHOSPASM WITH CHRONIC BRONCHITIS  
  
 lisinopril 10 mg tablet Commonly known as:  Estefani Dowdy Take 1 Tab by mouth daily. metFORMIN 1,000 mg tablet Commonly known as:  GLUCOPHAGE  
TAKE ONE TABLET BY MOUTH TWICE DAILY WITH FOOD  
  
 pravastatin 40 mg tablet Commonly known as:  PRAVACHOL  
TAKE ONE TABLET BY MOUTH ONCE DAILY predniSONE 10 mg dose pack Commonly known as:  STERAPRED DS See administration instruction per 10mg dose pack  
  
 umeclidinium 62.5 mcg/actuation inhaler Commonly known as:  INCRUSE ELLIPTA Take 1 Puff by inhalation daily. Prescriptions Sent to Pharmacy Refills  
 predniSONE (STERAPRED DS) 10 mg dose pack 0 Sig: See administration instruction per 10mg dose pack Class: Normal  
 Pharmacy: [id number] [facility] [place] [address] Ph #: [phone] Patient Instructions You have been referred to dermatology. Please call one of the preferred providers listed below and schedule your appointment.   Once you have scheduled your appointment, please call the office at [phone] leave the details of your appointment (provider you will be seeing, appointment date and time) on the voice mail. [facility] Dermatology Dr. Holden Reyes.,  Radha Kang Introducing [facility]! Dear Martin Fair: 
Thank you for requesting a [employer] account. Our records indicate that you already have an active [employer] account. You can access your account anytime at [email or web address] Did you know that you can access your hospital and ER discharge instructions at any time in [employer]? You can also review all of your test results from your hospital stay or ER visit. Additional Information If you have questions, please visit the Frequently Asked Questions section of the [employer] website at [email or web address] Remember, [employer] is NOT to be used for urgent needs. For medical emergencies, dial 911. Now available from your iPhone and Android! Please provide this summary of care documentation to your next provider. Your primary care clinician is listed as Norma Emanuel. If you have any questions after today's visit, please call [phone].

## 2017-08-21 NOTE — PATIENT INSTRUCTIONS
You have been referred to dermatology. Please call one of the preferred providers listed below and schedule your appointment. Once you have scheduled your appointment, please call the office at [phone] leave the details of your appointment (provider you will be seeing, appointment date and time) on the voice mail.     [facility] Dermatology   Dr. Waleska Humphrey    [address] [place] [address]

## 2017-08-21 NOTE — ASSESSMENT & PLAN NOTE
Stable, based on history, physical exam and review of pertinent labs, studies and medications; meds reconciled; continue current treatment plan. Key COPD Medications             predniSONE (STERAPRED DS) 10 mg dose pack  (Taking) See administration instruction per 10mg dose pack    ipratropium (ATROVENT) 0.02 % nebulizer solution  (Taking) 2.5 mL by Nebulization route every four (4) hours (while awake). Indications: PREVENTION OF BRONCHOSPASM WITH CHRONIC BRONCHITIS    albuterol (PROVENTIL HFA, VENTOLIN HFA, PROAIR HFA) 90 mcg/actuation inhaler  (Taking) Take 2 Puffs by inhalation every four (4) hours as needed for Wheezing. Indications: Chronic Obstructive Pulmonary Disease    umeclidinium (INCRUSE ELLIPTA) 62.5 mcg/actuation inhaler  (Taking) Take 1 Puff by inhalation daily.         Lab Results   Component Value Date/Time    WBC 7.6 04/18/2017 10:46 AM    HGB 10.8 04/18/2017 10:46 AM    HCT 32.8 04/18/2017 10:46 AM    PLATELET 785 12/20/7768 10:46 AM

## 2017-08-21 NOTE — ASSESSMENT & PLAN NOTE
resolved  Lab Results   Component Value Date/Time    WBC 7.6 04/18/2017 10:46 AM    HGB 10.8 04/18/2017 10:46 AM    HCT 32.8 04/18/2017 10:46 AM    PLATELET 391 22/09/0998 10:46 AM    Creatinine 1.23 04/18/2017 10:46 AM    Creatinine, POC 1.0 07/14/2017 01:12 PM    BUN 25 04/18/2017 10:46 AM    Potassium 4.3 04/18/2017 10:46 AM    INR 0.9 02/02/2017 09:25 AM    Prothrombin time 12.3 02/02/2017 09:25 AM

## 2017-08-21 NOTE — PROGRESS NOTES
Kate Hernandez is a 78 y.o. female here today for pre-op visit. 1. Have you been to the ER, urgent care clinic since your last visit? Hospitalized since your last visit? No    2. Have you seen or consulted any other health care providers outside of the [address] [place] [address] since your last visit? Include any pap smears or colon screening.  Yes [facility]

## 2017-08-21 NOTE — ASSESSMENT & PLAN NOTE
Stable, based on history, physical exam and review of pertinent labs, studies and medications; meds reconciled; f/u in 1mo. Key Antihyperglycemic Medications             glipiZIDE (GLUCOTROL) 10 mg tablet  (Taking) TAKE ONE TABLET BY MOUTH TWICE DAILY    metFORMIN (GLUCOPHAGE) 1,000 mg tablet  (Taking) TAKE ONE TABLET BY MOUTH TWICE DAILY WITH FOOD    insulin regular (NOVOLIN R, HUMULIN R) 100 unit/mL injection  (Taking) 15 Units by SubCUTAneous route three (3) times daily. Other Key Diabetic Medications             pravastatin (PRAVACHOL) 40 mg tablet  (Taking) TAKE ONE TABLET BY MOUTH ONCE DAILY    lisinopril (PRINIVIL, ZESTRIL) 10 mg tablet  (Taking) Take 1 Tab by mouth daily.         Lab Results   Component Value Date/Time    Hemoglobin A1c 9.0 02/19/2017 05:01 AM    Hemoglobin A1c (POC) 6.9 03/28/2017 07:49 AM    Glucose 170 04/18/2017 10:46 AM    Creatinine 1.23 04/18/2017 10:46 AM    Creatinine, POC 1.0 07/14/2017 01:12 PM    Microalbumin urine (POC) 30 04/18/2016 01:49 PM    Microalbumin/creat ratio (POC) <30mg/g 03/28/2017 08:13 AM    Cholesterol, total 146 02/19/2017 05:01 AM    HDL Cholesterol 39 02/19/2017 05:01 AM    LDL, calculated 64 02/19/2017 05:01 AM    Triglyceride 216 02/19/2017 05:01 AM     Diabetic Foot and Eye Exam HM Status   Topic Date Due    Eye Exam  02/07/2018    Diabetic Foot Care  03/28/2018

## 2017-09-26 ENCOUNTER — OFFICE VISIT (OUTPATIENT)
Dept: FAMILY MEDICINE CLINIC | Age: 67
End: 2017-09-26

## 2017-09-26 VITALS
TEMPERATURE: 97.8 F | DIASTOLIC BLOOD PRESSURE: 86 MMHG | BODY MASS INDEX: 32.93 KG/M2 | HEART RATE: 95 BPM | WEIGHT: 174.4 LBS | OXYGEN SATURATION: 98 % | RESPIRATION RATE: 20 BRPM | SYSTOLIC BLOOD PRESSURE: 138 MMHG | HEIGHT: 61 IN

## 2017-09-26 DIAGNOSIS — J41.0 SIMPLE CHRONIC BRONCHITIS (HCC): ICD-10-CM

## 2017-09-26 DIAGNOSIS — Z79.4 TYPE 2 DIABETES MELLITUS WITH OTHER NEUROLOGIC COMPLICATION, WITH LONG-TERM CURRENT USE OF INSULIN (HCC): Primary | ICD-10-CM

## 2017-09-26 DIAGNOSIS — Z87.81 HISTORY OF COMPRESSION FRACTURE OF SPINE: ICD-10-CM

## 2017-09-26 DIAGNOSIS — L30.9 DERMATITIS: ICD-10-CM

## 2017-09-26 DIAGNOSIS — I50.32 CHRONIC DIASTOLIC CONGESTIVE HEART FAILURE (HCC): ICD-10-CM

## 2017-09-26 DIAGNOSIS — E11.49 TYPE 2 DIABETES MELLITUS WITH OTHER NEUROLOGIC COMPLICATION, WITH LONG-TERM CURRENT USE OF INSULIN (HCC): Primary | ICD-10-CM

## 2017-09-26 DIAGNOSIS — Z86.73 HISTORY OF STROKE: ICD-10-CM

## 2017-09-26 DIAGNOSIS — H61.21 IMPACTED CERUMEN OF RIGHT EAR: ICD-10-CM

## 2017-09-26 PROBLEM — S32.000A LUMBAR COMPRESSION FRACTURE (HCC): Status: RESOLVED | Noted: 2017-01-25 | Resolved: 2017-09-26

## 2017-09-26 PROBLEM — I63.9 OCCIPITAL STROKE (HCC): Status: RESOLVED | Noted: 2017-02-18 | Resolved: 2017-09-26

## 2017-09-26 LAB — HBA1C MFR BLD HPLC: 7.4 %

## 2017-09-26 RX ORDER — CLOTRIMAZOLE AND BETAMETHASONE DIPROPIONATE 10; .64 MG/G; MG/G
CREAM TOPICAL
Qty: 15 G | Refills: 0 | Status: SHIPPED | OUTPATIENT
Start: 2017-09-26 | End: 2019-01-15 | Stop reason: ALTCHOICE

## 2017-09-26 RX ORDER — ATORVASTATIN CALCIUM 40 MG/1
40 TABLET, FILM COATED ORAL DAILY
Qty: 90 TAB | Refills: 0 | Status: SHIPPED | OUTPATIENT
Start: 2017-09-26 | End: 2018-02-28 | Stop reason: ALTCHOICE

## 2017-09-26 NOTE — PATIENT INSTRUCTIONS
Learning About the [address] [facility] Diet  What is the Mediterranean diet? The Mediterranean diet is a style of eating rather than a diet plan. It features foods eaten in [place], [place], [place] and [place], and other countries along the [facility]. It emphasizes eating foods like fish, fruits, vegetables, beans, high-fiber breads and whole grains, nuts, and olive oil. This style of eating includes limited red meat, cheese, and sweets. Why choose the Mediterranean diet? A Mediterranean-style diet may improve heart health. It contains more fat than other heart-healthy diets. But the fats are mainly from nuts, unsaturated oils (such as fish oils and olive oil), and certain nut or seed oils (such as canola, soybean, or flaxseed oil). These fats may help protect the heart and blood vessels. How can you get started on the Mediterranean diet? Here are some things you can do to switch to a more Mediterranean way of eating. What to eat  · Eat a variety of fruits and vegetables each day, such as grapes, blueberries, tomatoes, broccoli, peppers, figs, olives, spinach, eggplant, beans, lentils, and chickpeas. · Eat a variety of whole-grain foods each day, such as oats, brown rice, and whole wheat bread, pasta, and couscous. · Eat fish at least 2 times a week. Try tuna, salmon, mackerel, lake trout, herring, or sardines. · Eat moderate amounts of low-fat dairy products, such as milk, cheese, or yogurt. · Eat moderate amounts of poultry and eggs. · Choose healthy (unsaturated) fats, such as nuts, olive oil, and certain nut or seed oils like canola, soybean, and flaxseed. · Limit unhealthy (saturated) fats, such as butter, palm oil, and coconut oil. And limit fats found in animal products, such as meat and dairy products made with whole milk. Try to eat red meat only a few times a month in very small amounts. · Limit sweets and desserts to only a few times a week.  This includes sugar-sweetened drinks like soda. The Mediterranean diet may also include red wine with your meal1 glass each day for women and up to 2 glasses a day for men. Tips for eating at home  · Use herbs, spices, garlic, lemon zest, and citrus juice instead of salt to add flavor to foods. · Add avocado slices to your sandwich instead of ulrich. · Have fish for lunch or dinner instead of red meat. Brush the fish with olive oil, and broil or grill it. · Sprinkle your salad with seeds or nuts instead of cheese. · Cook with olive or canola oil instead of butter or oils that are high in saturated fat. · Switch from 2% milk or whole milk to 1% or fat-free milk. · Dip raw vegetables in a vinaigrette dressing or hummus instead of dips made from mayonnaise or sour cream.  · Have a piece of fruit for dessert instead of a piece of cake. Try baked apples, or have some dried fruit. Tips for eating out  · Try broiled, grilled, baked, or poached fish instead of having it fried or breaded. · Ask your  to have your meals prepared with olive oil instead of butter. · Order dishes made with marinara sauce or sauces made from olive oil. Avoid sauces made from cream or mayonnaise. · Choose whole-grain breads, whole wheat pasta and pizza crust, brown rice, beans, and lentils. · Cut back on butter or margarine on bread. Instead, you can dip your bread in a small amount of olive oil. · Ask for a side salad or grilled vegetables instead of french fries or chips. Where can you learn more? Go to http://flores-kendell.info/. Enter [phone] in the search box to learn more about \"Learning About the Mediterranean Diet. \"  Current as of: December 29, 2016  Content Version: 11.3  © 0335-9280 [employer]. Care instructions adapted under license by [employer] (which disclaims liability or warranty for this information).  If you have questions about a medical condition or this instruction, always ask your healthcare professional. [address] any warranty or liability for your use of this information.

## 2017-09-26 NOTE — PROGRESS NOTES
Vilma Ramirez is a 78 y.o. female is here for a diabetes follow up. 1. Have you been to the ER, urgent care clinic since your last visit? Hospitalized since your last visit? Cataract surgery both eyes. 2. Have you seen or consulted any other health care providers outside of the [address] [place] [address] since your last visit? Include any pap smears or colon screening. Dr. Verito Petersen for eye surgery [facility]      There are no preventive care reminders to display for this patient. Already got flu shot.

## 2017-09-26 NOTE — PROGRESS NOTES
Assessment/Plan:    1. Type 2 diabetes mellitus with other neurologic complication, with long-term current use of insulin (HCC)  -A1c 7.4. Cont current regimen. - AMB POC HEMOGLOBIN A1C    2. Impacted cerumen of right ear  - REMOVAL IMPACTED CERUMEN IRRIGATION/LVG UNILAT; Future    3. Dermatitis  - clotrimazole-betamethasone (LOTRISONE) topical cream; Apply bid to affected area (pea-sized amount)  Dispense: 15 g; Refill: 0      The plan was discussed with the patient. The patient verbalized understanding and is in agreement with the plan. All medication potential side effects were discussed with the patient. Health Maintenance:   Health Maintenance   Topic Date Due    EYE EXAM RETINAL OR DILATED Q1  02/07/2018    LIPID PANEL Q1  02/19/2018    HEMOGLOBIN A1C Q6M  03/26/2018    FOOT EXAM Q1  03/28/2018    MICROALBUMIN Q1  03/28/2018    MEDICARE YEARLY EXAM  07/18/2018    GLAUCOMA SCREENING Q2Y  02/07/2019    BREAST CANCER SCRN MAMMOGRAM  08/11/2019    COLONOSCOPY  10/06/2019    DTaP/Tdap/Td series (2 - Td) 07/26/2026    Hepatitis C Screening  Completed    OSTEOPOROSIS SCREENING (DEXA)  Completed    ZOSTER VACCINE AGE 60>  Addressed    Pneumococcal 65+ Low/Medium Risk  Completed    INFLUENZA AGE 9 TO ADULT  Addressed       Macy Forrest is a 78 y.o. female and presents with Diabetes     Subjective:  DM2- A1c 7.4. HLD - was on pravastatin but had itching. She stopped it and the generalized itch resolved. ROS:  Constitutional: No recent weight change. No weakness/fatigue. No f/c. Skin: No rashes, change in nails/hair, itching   HENT: No HA, dizziness. No hearing loss/tinnitus. No nasal congestion/discharge. Eyes: No change in vision, double/blurred vision or eye pain/redness. Cardiovascular: No CP/palpitations. No NINA/orthopnea/PND. Respiratory: No cough/sputum, dyspnea, wheezing. Gastointestinal: No dysphagia, reflux. No n/v. No constipation/diarrhea.   No melena/rectal bleeding. Genitourinary: No dysuria, urinary hesitancy, nocturia, hematuria. No incontinence. Musculoskeletal: No joint pain/stiffness. No muscle pain/tenderness. Endo: No heat/cold intolerance, no polyuria/polydypsia. Heme: No h/o anemia. No easy bleeding/bruising. Allergy/Immunology: No seasonal rhinitis. Denies frequent colds, sinus/ear infections. Neurological: No seizures/numbness/weakness. No paresthesias. Psychiatric:  No depression, anxiety. The problem list was updated as a part of today's visit. Patient Active Problem List   Diagnosis Code    Vitamin D deficiency E55.9    Hx of breast cancer Z85.3    Chronic pain syndrome G89.4    Osteoarthrosis, unspecified whether generalized or localized, unspecified site V04.42    Diastolic congestive heart failure ([facility]) I50.30    COPD (chronic obstructive pulmonary disease) ([facility]) J44.9    GERD (gastroesophageal reflux disease) K21.9    Tobacco dependence F17.200    Chronic radicular lumbar pain M54.16, G89.29    Scoliosis M41.9    Essential hypertension I10    Pure hypercholesterolemia E78.00    CAD (coronary artery disease) I25.10    Spinal stenosis of lumbar region with neurogenic claudication M48.06    S/P lumbar fusion Z98.1    Osteoporosis M81.0    Left hip pain M25.552    Lumbar compression fracture ([address] L2, post kypho 2/2017 S32.000A    Hemianopsia H53.47    Occipital stroke ([address] I63.9    Reactive depression F32.9    Stenosis of left carotid artery I65.22    Carpal tunnel syndrome of right wrist G56.01    Vomiting R11.10    Pain of upper abdomen R10.10    Sacral pain M53.3    Type 2 diabetes mellitus with neurologic complication, with long-term current use of insulin ([facility]) E11.49, Z79.4       The PSH, FH were reviewed.     SH:  Social History   Substance Use Topics    Smoking status: Current Some Day Smoker     Packs/day: 0.50     Years: 35.00     Types: Cigarettes     Last attempt to quit: 2/20/2017   [facility] Smokeless tobacco: Never Used    Alcohol use No       Medications/Allergies:  Current Outpatient Prescriptions on File Prior to Visit   Medication Sig Dispense Refill    carvedilol (COREG) 3.125 mg tablet Take 1 Tab by mouth two (2) times daily (with meals). 180 Tab 0    lisinopril (PRINIVIL, ZESTRIL) 10 mg tablet Take 1 Tab by mouth daily. 90 Tab 0    HYDROcodone-acetaminophen (NORCO) 5-325 mg per tablet Take 1 Tab by mouth two (2) times daily as needed. Max Daily Amount: 2 Tabs. For chronic back pain 60 Tab 0    glipiZIDE (GLUCOTROL) 10 mg tablet TAKE ONE TABLET BY MOUTH TWICE DAILY 180 Tab 0    metFORMIN (GLUCOPHAGE) 1,000 mg tablet TAKE ONE TABLET BY MOUTH TWICE DAILY WITH FOOD 180 Tab 0    clopidogrel (PLAVIX) 75 mg tab Take 1 Tab by mouth daily. 90 Tab 3    cyanocobalamin (VITAMIN B12) 2,500 mcg sublingual tablet Take 1 Tab by mouth daily. 100 Tab 3    ergocalciferol (ERGOCALCIFEROL) 50,000 unit capsule Take 1 Cap by mouth every Sunday. 4 Cap 3    alendronate (FOSAMAX) 70 mg tablet Take 1 Tab by mouth every seven (7) days. 30 Tab 0    insulin regular (NOVOLIN R, HUMULIN R) 100 unit/mL injection 15 Units by SubCUTAneous route three (3) times daily. 4 Vial 0    ipratropium (ATROVENT) 0.02 % nebulizer solution 2.5 mL by Nebulization route every four (4) hours (while awake). Indications: PREVENTION OF BRONCHOSPASM WITH CHRONIC BRONCHITIS 60 mL 1    albuterol (PROVENTIL HFA, VENTOLIN HFA, PROAIR HFA) 90 mcg/actuation inhaler Take 2 Puffs by inhalation every four (4) hours as needed for Wheezing. Indications: Chronic Obstructive Pulmonary Disease 1 Inhaler 1    insulin syringe-needle U-100 Dispense ultrafine 0.5 mL syringes with 31 gauge needle 100 Syringe 11     No current facility-administered medications on file prior to visit.          Allergies   Allergen Reactions    Percocet [Oxycodone-Acetaminophen] Rash and Itching     Can Take Percocet but must take Benadryl for itching     Perfume Ht52 Rash Perfume specific:  MUSK       Objective:  Visit Vitals    /86 (BP 1 Location: Right arm, BP Patient Position: Sitting)    Pulse 95    Temp 97.8 °F (36.6 °C) (Oral)    Resp 20    Ht 5' 1\" (1.549 m)    Wt 174 lb 6.4 oz (79.1 kg)    SpO2 98%    BMI 32.95 kg/m2      Constitutional: Well developed, nourished, no distress, alert, obese habitus   HENT: Exterior ears and tympanic membranes normal bilaterally. Supple neck. No thyromegaly or lymphadenopathy. Oropharynx clear and moist mucous membranes. +middle ear effusion on L.  R ear impacted cerumen. Eyes: Conjunctiva normal. PERRL. CV: S1, S2.  RRR. No murmurs/rubs. No thrills palpated. No carotid bruits. Intact distal pulses. No edema. Pulm: No abnormalities on inspection. Clear to auscultation bilaterally. No wheezing/rhonchi. Normal effort.

## 2017-09-26 NOTE — ASSESSMENT & PLAN NOTE
Stable, based on history, physical exam and review of pertinent labs, studies and medications; meds reconciled; continue current treatment plan. Key CAD CHF Meds             carvedilol (COREG) 3.125 mg tablet  (Taking) Take 1 Tab by mouth two (2) times daily (with meals). lisinopril (PRINIVIL, ZESTRIL) 10 mg tablet  (Taking) Take 1 Tab by mouth daily. clopidogrel (PLAVIX) 75 mg tab  (Taking) Take 1 Tab by mouth daily. Key Antihyperlipidemia Meds             atorvastatin (LIPITOR) 40 mg tablet  (Taking) Take 1 Tab by mouth daily.         Lab Results   Component Value Date/Time    Sodium 134 04/18/2017 10:46 AM    Potassium 4.3 04/18/2017 10:46 AM    Cholesterol, total 146 02/19/2017 05:01 AM    HDL Cholesterol 39 02/19/2017 05:01 AM    LDL, calculated 64 02/19/2017 05:01 AM    Triglyceride 216 02/19/2017 05:01 AM    INR 0.9 02/02/2017 09:25 AM    Prothrombin time 12.3 02/02/2017 09:25 AM

## 2017-09-26 NOTE — MR AVS SNAPSHOT
Visit Information Date & Time Provider Department Dept. Phone Encounter #  
 9/26/2017 10:00 AM Ana Cristina العراقي [facility] [phone] [id number] Your Appointments 10/2/2017  9:15 AM  
DIAG TEST F/U with Justin Downs MD  
[facility] ([facility]) Appt Note: MRI F/U BRING DISC; MRI F/U BRING DISC; pt rsd back to this orig date/time [address] [facility] [address]  
[phone]  
  
   
 [address] [facility] [address] Upcoming Health Maintenance Date Due  
 EYE EXAM RETINAL OR DILATED Q1 2/7/2018 LIPID PANEL Q1 2/19/2018 HEMOGLOBIN A1C Q6M 3/26/2018 FOOT EXAM Q1 3/28/2018 MICROALBUMIN Q1 3/28/2018 MEDICARE YEARLY EXAM 7/18/2018 GLAUCOMA SCREENING Q2Y 2/7/2019 BREAST CANCER SCRN MAMMOGRAM 8/11/2019 COLONOSCOPY 10/6/2019 DTaP/Tdap/Td series (2 - Td) 7/26/2026 Allergies as of 9/26/2017  Review Complete On: 9/26/2017 By: Otilia Banegas LPN Severity Noted Reaction Type Reactions Percocet [Oxycodone-acetaminophen]  10/08/2010    Rash, Itching Can Take Percocet but must take Benadryl for itching Perfume Ht52  02/05/2015    Rash Perfume specific:  MUSK Pravastatin  09/26/2017    Itching Current Immunizations  Reviewed on 4/18/2016 Name Date Influenza High Dose Vaccine PF 8/21/2017 11:43 AM, 10/19/2016  9:33 AM  
 Influenza Vaccine 10/19/2015  8:47 AM, 10/17/2013 Influenza Vaccine Whole 12/1/2008 Pneumococcal Polysaccharide (PPSV-23) 4/18/2016  1:50 PM  
 ZZZ-RETIRED (DO NOT USE) Pneumococcal Vaccine (Unspecified Type) 12/1/2008 Not reviewed this visit You Were Diagnosed With   
  
 Codes Comments Type 2 diabetes mellitus with other neurologic complication, with long-term current use of insulin (HCC)    -  Primary ICD-10-CM: E11.49, Z79.4 ICD-9-CM: 250.60, V58.67   
 Impacted cerumen of right ear     ICD-10-CM: H61.21 ICD-9-CM: 380.4 Dermatitis     ICD-10-CM: L30.9 ICD-9-CM: 692.9 Vitals BP Pulse Temp Resp Height(growth percentile) Weight(growth percentile) 138/86 (BP 1 Location: Right arm, BP Patient Position: Sitting) 95 97.8 °F (36.6 °C) (Oral) 20 5' 1\" (1.549 m) 174 lb 6.4 oz (79.1 kg) SpO2 BMI OB Status Smoking Status 98% 32.95 kg/m2 Hysterectomy Current Some Day Smoker BMI and BSA Data Body Mass Index Body Surface Area 32.95 kg/m 2 1.85 m 2 Preferred Pharmacy Pharm[facility] Name Phone [facility] [address] [phone] Your Updated Medication List  
  
   
This list is accurate as of: 9/26/17 10:15 AM.  Always use your most recent med list.  
  
  
  
  
 albuterol 90 mcg/actuation inhaler Commonly known as:  PROVENTIL HFA, VENTOLIN HFA, PROAIR HFA Take 2 Puffs by inhalation every four (4) hours as needed for Wheezing. Indications: Chronic Obstructive Pulmonary Disease  
  
 alendronate 70 mg tablet Commonly known as:  FOSAMAX Take 1 Tab by mouth every seven (7) days. atorvastatin 40 mg tablet Commonly known as:  LIPITOR Take 1 Tab by mouth daily. carvedilol 3.125 mg tablet Commonly known as:  Patricia Menghini Take 1 Tab by mouth two (2) times daily (with meals). clopidogrel 75 mg Tab Commonly known as:  PLAVIX Take 1 Tab by mouth daily. clotrimazole-betamethasone topical cream  
Commonly known as:  Sunny Ivory Apply bid to affected area (pea-sized amount) cyanocobalamin 2,500 mcg sublingual tablet Commonly known as:  VITAMIN B12 Take 1 Tab by mouth daily. ergocalciferol 50,000 unit capsule Commonly known as:  ERGOCALCIFEROL Take 1 Cap by mouth every Sunday. glipiZIDE 10 mg tablet Commonly known as:  GLUCOTROL  
TAKE ONE TABLET BY MOUTH TWICE DAILY HYDROcodone-acetaminophen 5-325 mg per tablet Commonly known as:  Heaven Gross Take 1 Tab by mouth two (2) times daily as needed. Max Daily Amount: 2 Tabs. For chronic back pain  
  
 insulin regular 100 unit/mL injection Commonly known as:  NOVOLIN R, HUMULIN R  
15 Units by SubCUTAneous route three (3) times daily. insulin syringe-needle U-100 Dispense ultrafine 0.5 mL syringes with 31 gauge needle  
  
 ipratropium 0.02 % Soln Commonly known as:  ATROVENT  
2.5 mL by Nebulization route every four (4) hours (while awake). Indications: PREVENTION OF BRONCHOSPASM WITH CHRONIC BRONCHITIS  
  
 lisinopril 10 mg tablet Commonly known as:  Nirav Peek Take 1 Tab by mouth daily. metFORMIN 1,000 mg tablet Commonly known as:  GLUCOPHAGE  
TAKE ONE TABLET BY MOUTH TWICE DAILY WITH FOOD Prescriptions Sent to Pharmacy Refills  
 atorvastatin (LIPITOR) 40 mg tablet 0 Sig: Take 1 Tab by mouth daily. Class: Normal  
 Pharmacy: [address] Ph #: [phone] Route: Oral  
 clotrimazole-betamethasone (LOTRISONE) topical cream 0 Sig: Apply bid to affected area (pea-sized amount) Class: Normal  
 Pharmacy: [address] Ph #: [phone] We Performed the Following AMB POC HEMOGLOBIN A1C [37782 CPT(R)] To-Do List   
 09/26/2017 Procedures:  REMOVAL IMPACTED CERUMEN IRRIGATION/LVG UNILAT   
  
 09/27/2017  1:00 PM  
(Arrive by 12:30 PM) Appointment with [facility] at [facility] ([phone]) 1. Wash your hair the night before test.  Use only shampoo and water come to the lab with clean, dry hair. Do not use gels, mousse, hair spray or oils of any kind after washing your hair. Remove all extensions and or hair weaves. If not removed, we may not be able to do your study.        2.  Take all regular medication as scheduled, unless your doctor specifically tells you to stop. 3.  Continue to eat as usual.     4.  Bring a written list of all current medications including over the counter medications and any supplements you take. Please arrive 30 minutes prior to appaointment to register  
  
 09/27/2017  2:00 PM  
(Arrive by 1:30 PM) Appointment with [facility] at [address] ([phone]) No preparation is required for this study. Patient can have their meals and take their medications. Patient should not wear a turtleneck or high neck shirt for this study. Please arrive 30 minutes prior to appointment to register Patient Instructions Learning About the [address] [facility] Diet What is the Mediterranean diet? The Mediterranean diet is a style of eating rather than a diet plan. It features foods eaten in [place], [place], [place] and [place], and other countries along the [facility]. It emphasizes eating foods like fish, fruits, vegetables, beans, high-fiber breads and whole grains, nuts, and olive oil. This style of eating includes limited red meat, cheese, and sweets. Why choose the Mediterranean diet? A Mediterranean-style diet may improve heart health. It contains more fat than other heart-healthy diets. But the fats are mainly from nuts, unsaturated oils (such as fish oils and olive oil), and certain nut or seed oils (such as canola, soybean, or flaxseed oil). These fats may help protect the heart and blood vessels. How can you get started on the Mediterranean diet? Here are some things you can do to switch to a more Mediterranean way of eating. What to eat · Eat a variety of fruits and vegetables each day, such as grapes, blueberries, tomatoes, broccoli, peppers, figs, olives, spinach, eggplant, beans, lentils, and chickpeas. · Eat a variety of whole-grain foods each day, such as oats, brown rice, and whole wheat bread, pasta, and couscous. · Eat fish at least 2 times a week. Try tuna, salmon, mackerel, lake trout, herring, or sardines. · Eat moderate amounts of low-fat dairy products, such as milk, cheese, or yogurt. · Eat moderate amounts of poultry and eggs. · Choose healthy (unsaturated) fats, such as nuts, olive oil, and certain nut or seed oils like canola, soybean, and flaxseed. · Limit unhealthy (saturated) fats, such as butter, palm oil, and coconut oil. And limit fats found in animal products, such as meat and dairy products made with whole milk. Try to eat red meat only a few times a month in very small amounts. · Limit sweets and desserts to only a few times a week. This includes sugar-sweetened drinks like soda. The Mediterranean diet may also include red wine with your meal1 glass each day for women and up to 2 glasses a day for men. Tips for eating at home · Use herbs, spices, garlic, lemon zest, and citrus juice instead of salt to add flavor to foods. · Add avocado slices to your sandwich instead of ulrich. · Have fish for lunch or dinner instead of red meat. Brush the fish with olive oil, and broil or grill it. · Sprinkle your salad with seeds or nuts instead of cheese. · Cook with olive or canola oil instead of butter or oils that are high in saturated fat. · Switch from 2% milk or whole milk to 1% or fat-free milk. · Dip raw vegetables in a vinaigrette dressing or hummus instead of dips made from mayonnaise or sour cream. 
· Have a piece of fruit for dessert instead of a piece of cake. Try baked apples, or have some dried fruit. Tips for eating out · Try broiled, grilled, baked, or poached fish instead of having it fried or breaded. · Ask your  to have your meals prepared with olive oil instead of butter. · Order dishes made with marinara sauce or sauces made from olive oil. Avoid sauces made from cream or mayonnaise.  
· Choose whole-grain breads, whole wheat pasta and pizza crust, brown rice, beans, and lentils. · Cut back on butter or margarine on bread. Instead, you can dip your bread in a small amount of olive oil. · Ask for a side salad or grilled vegetables instead of french fries or chips. Where can you learn more? Go to http://flores-kendell.info/. Enter 154-053-6208 in the search box to learn more about \"Learning About the Mediterranean Diet. \" Current as of: December 29, 2016 Content Version: 11.3 © 5020-1961 [employer]. Care instructions adapted under license by [employer] (which disclaims liability or warranty for this information). If you have questions about a medical condition or this instruction, always ask your healthcare professional. [address] any warranty or liability for your use of this information. Introducing [facility]! Dear Any Dumont: 
Thank you for requesting a [employer] account. Our records indicate that you already have an active [employer] account. You can access your account anytime at [email or web address] Did you know that you can access your hospital and ER discharge instructions at any time in [employer]? You can also review all of your test results from your hospital stay or ER visit. Additional Information If you have questions, please visit the Frequently Asked Questions section of the [employer] website at [email or web address] Remember, [employer] is NOT to be used for urgent needs. For medical emergencies, dial 911. Now available from your iPhone and Android! Please provide this summary of care documentation to your next provider. Your primary care clinician is listed as Norma Emanuel. If you have any questions after today's visit, please call [phone].

## 2017-09-26 NOTE — ASSESSMENT & PLAN NOTE
Stable, based on history, physical exam and review of pertinent labs, studies and medications; meds reconciled; continue current treatment plan. Key COPD Medications             ipratropium (ATROVENT) 0.02 % nebulizer solution  (Taking) 2.5 mL by Nebulization route every four (4) hours (while awake). Indications: PREVENTION OF BRONCHOSPASM WITH CHRONIC BRONCHITIS    albuterol (PROVENTIL HFA, VENTOLIN HFA, PROAIR HFA) 90 mcg/actuation inhaler  (Taking) Take 2 Puffs by inhalation every four (4) hours as needed for Wheezing.  Indications: Chronic Obstructive Pulmonary Disease        Lab Results   Component Value Date/Time    WBC 7.6 04/18/2017 10:46 AM    HGB 10.8 04/18/2017 10:46 AM    HCT 32.8 04/18/2017 10:46 AM    PLATELET 061 19/83/4892 10:46 AM

## 2017-10-02 ENCOUNTER — OFFICE VISIT (OUTPATIENT)
Dept: ORTHOPEDIC SURGERY | Age: 67
End: 2017-10-02

## 2017-10-02 VITALS
RESPIRATION RATE: 22 BRPM | HEART RATE: 104 BPM | WEIGHT: 174.8 LBS | DIASTOLIC BLOOD PRESSURE: 93 MMHG | BODY MASS INDEX: 34.32 KG/M2 | HEIGHT: 60 IN | SYSTOLIC BLOOD PRESSURE: 157 MMHG | TEMPERATURE: 97.7 F

## 2017-10-02 DIAGNOSIS — M96.1 LUMBAR POST-LAMINECTOMY SYNDROME: Primary | Chronic | ICD-10-CM

## 2017-10-02 DIAGNOSIS — Z87.81 HISTORY OF COMPRESSION FRACTURE OF SPINE: ICD-10-CM

## 2017-10-02 RX ORDER — BUPROPION HYDROCHLORIDE 75 MG/1
75 TABLET ORAL
COMMUNITY
End: 2017-10-02 | Stop reason: ALTCHOICE

## 2017-10-02 RX ORDER — FLUOXETINE HYDROCHLORIDE 20 MG/1
20 CAPSULE ORAL
COMMUNITY
End: 2017-10-02 | Stop reason: ALTCHOICE

## 2017-10-02 RX ORDER — PROMETHAZINE HYDROCHLORIDE 25 MG/1
25 TABLET ORAL
COMMUNITY
Start: 2010-08-23 | End: 2017-10-02 | Stop reason: ALTCHOICE

## 2017-10-02 RX ORDER — HYDROCODONE BITARTRATE AND ACETAMINOPHEN 5; 325 MG/1; MG/1
TABLET ORAL
Qty: 60 TAB | Refills: 0 | Status: SHIPPED | OUTPATIENT
Start: 2017-10-02 | End: 2018-01-09 | Stop reason: ALTCHOICE

## 2017-10-02 RX ORDER — DICYCLOMINE HYDROCHLORIDE 10 MG/1
20 CAPSULE ORAL
COMMUNITY
End: 2018-02-14 | Stop reason: ALTCHOICE

## 2017-10-02 RX ORDER — PRAVASTATIN SODIUM 40 MG/1
40 TABLET ORAL DAILY
COMMUNITY
End: 2018-05-02 | Stop reason: SDUPTHER

## 2017-10-02 RX ORDER — FENTANYL 12.5 UG/1
1 PATCH TRANSDERMAL
COMMUNITY
End: 2017-10-02 | Stop reason: ALTCHOICE

## 2017-10-02 NOTE — PATIENT INSTRUCTIONS

## 2017-10-02 NOTE — PROGRESS NOTES
Verbal order entered per Dr. Toño Ashby as documented on blue sheet:   -hydrocodone 5/325 mg, 1 tab PO every day to BID PRN pain, disp 60, RF
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
## 2017-10-03 DIAGNOSIS — M53.3 SACRAL PAIN: Primary | ICD-10-CM

## 2017-10-03 DIAGNOSIS — M81.0 OSTEOPOROSIS, UNSPECIFIED OSTEOPOROSIS TYPE, UNSPECIFIED PATHOLOGICAL FRACTURE PRESENCE: ICD-10-CM

## 2017-10-03 DIAGNOSIS — Z87.81 HISTORY OF COMPRESSION FRACTURE OF SPINE: ICD-10-CM

## 2017-10-03 NOTE — PROGRESS NOTES
Call patient, let her know that Dr. Cooper Easley looked at her films. He does not think the disc is the problem. He rec a SPECT scan and f/u with him. I put in the order.

## 2017-10-04 ENCOUNTER — TELEPHONE (OUTPATIENT)
Dept: ORTHOPEDIC SURGERY | Age: 67
End: 2017-10-04

## 2017-10-04 NOTE — TELEPHONE ENCOUNTER
-called patient and verified name and . Patient was informed that she will be contacted to make an appointment with the  for a SPECT scan and a f/u appointment was made with Dr. Kina Pratt, per progress note from Dr. Clare Gonzalez. The patient verbalized understanding.

## 2017-10-05 ENCOUNTER — TELEPHONE (OUTPATIENT)
Dept: ORTHOPEDIC SURGERY | Age: 67
End: 2017-10-05

## 2017-10-05 NOTE — TELEPHONE ENCOUNTER
Order and office notes faxed to [facility] to have them set up Nuclear Medicine Bone Spect Scan and to notify patient of date. They will authorize with insurance if needed. Patient aware.

## 2017-10-16 ENCOUNTER — TELEPHONE (OUTPATIENT)
Dept: FAMILY MEDICINE CLINIC | Age: 67
End: 2017-10-16

## 2017-10-16 DIAGNOSIS — I10 ESSENTIAL HYPERTENSION: ICD-10-CM

## 2017-10-16 DIAGNOSIS — I25.10 CORONARY ARTERY DISEASE INVOLVING NATIVE HEART WITHOUT ANGINA PECTORIS, UNSPECIFIED VESSEL OR LESION TYPE: ICD-10-CM

## 2017-10-16 RX ORDER — CARVEDILOL 3.12 MG/1
3.12 TABLET ORAL 2 TIMES DAILY WITH MEALS
Qty: 180 TAB | Refills: 0 | Status: SHIPPED | OUTPATIENT
Start: 2017-10-16 | End: 2018-01-17 | Stop reason: SDUPTHER

## 2017-10-16 RX ORDER — LISINOPRIL 10 MG/1
10 TABLET ORAL DAILY
Qty: 90 TAB | Refills: 0 | Status: SHIPPED | OUTPATIENT
Start: 2017-10-16 | End: 2017-10-27 | Stop reason: SDUPTHER

## 2017-10-16 NOTE — TELEPHONE ENCOUNTER
Pt called stating that her  \"refuses to let me take the Lipitor because of all the side effects\".  Pt states she is still taking Pravastatin and \"just dealing with the itching\"

## 2017-10-27 DIAGNOSIS — I10 ESSENTIAL HYPERTENSION: ICD-10-CM

## 2017-10-27 DIAGNOSIS — I25.10 CORONARY ARTERY DISEASE INVOLVING NATIVE HEART WITHOUT ANGINA PECTORIS, UNSPECIFIED VESSEL OR LESION TYPE: ICD-10-CM

## 2017-10-27 RX ORDER — LISINOPRIL 10 MG/1
TABLET ORAL
Qty: 90 TAB | Refills: 0 | Status: SHIPPED | OUTPATIENT
Start: 2017-10-27 | End: 2018-01-08 | Stop reason: SDUPTHER

## 2017-11-02 DIAGNOSIS — M81.0 OSTEOPOROSIS, UNSPECIFIED OSTEOPOROSIS TYPE, UNSPECIFIED PATHOLOGICAL FRACTURE PRESENCE: ICD-10-CM

## 2017-11-02 DIAGNOSIS — Z87.81 HISTORY OF COMPRESSION FRACTURE OF SPINE: ICD-10-CM

## 2017-11-02 DIAGNOSIS — M53.3 SACRAL PAIN: ICD-10-CM

## 2017-11-10 ENCOUNTER — OFFICE VISIT (OUTPATIENT)
Dept: ORTHOPEDIC SURGERY | Age: 67
End: 2017-11-10

## 2017-11-10 VITALS
OXYGEN SATURATION: 98 % | SYSTOLIC BLOOD PRESSURE: 155 MMHG | HEIGHT: 60 IN | WEIGHT: 176 LBS | DIASTOLIC BLOOD PRESSURE: 96 MMHG | BODY MASS INDEX: 34.55 KG/M2 | HEART RATE: 99 BPM | RESPIRATION RATE: 16 BRPM | TEMPERATURE: 98.1 F

## 2017-11-10 DIAGNOSIS — M53.3 SACRAL PAIN: ICD-10-CM

## 2017-11-10 DIAGNOSIS — M96.1 LUMBAR POST-LAMINECTOMY SYNDROME: Primary | ICD-10-CM

## 2017-11-10 DIAGNOSIS — Z87.81 HISTORY OF COMPRESSION FRACTURE OF SPINE: ICD-10-CM

## 2017-11-10 RX ORDER — DICYCLOMINE HYDROCHLORIDE 20 MG/1
TABLET ORAL
COMMUNITY
Start: 2017-10-13 | End: 2018-02-28 | Stop reason: ALTCHOICE
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
## 2017-11-13 ENCOUNTER — TELEPHONE (OUTPATIENT)
Dept: ORTHOPEDIC SURGERY | Age: 67
End: 2017-11-13

## 2017-11-13 DIAGNOSIS — M81.0 OSTEOPOROSIS, UNSPECIFIED OSTEOPOROSIS TYPE, UNSPECIFIED PATHOLOGICAL FRACTURE PRESENCE: ICD-10-CM

## 2017-11-13 NOTE — TELEPHONE ENCOUNTER
Authorization for CT of L-spine to done @ [facility] valid from 11-13-17 thru 12-13-17 # [id number], scheduled for 11-14-17 @ 12:30pm

## 2017-11-15 RX ORDER — ALENDRONATE SODIUM 70 MG/1
70 TABLET ORAL
Qty: 30 TAB | Refills: 1 | Status: SHIPPED | OUTPATIENT
Start: 2017-11-15 | End: 2019-09-16 | Stop reason: SDDI

## 2017-11-16 ENCOUNTER — TELEPHONE (OUTPATIENT)
Dept: FAMILY MEDICINE CLINIC | Age: 67
End: 2017-11-16

## 2017-11-16 NOTE — TELEPHONE ENCOUNTER
Pt left msg with answering service at 12:53pm:    \"Problems with medication refill please call\"    I attempted to contact pt for clarification however she did not answer. I left  for pt to return call.

## 2017-11-17 ENCOUNTER — TELEPHONE (OUTPATIENT)
Dept: FAMILY MEDICINE CLINIC | Age: 67
End: 2017-11-17

## 2017-11-17 ENCOUNTER — OFFICE VISIT (OUTPATIENT)
Dept: ORTHOPEDIC SURGERY | Age: 67
End: 2017-11-17

## 2017-11-17 VITALS — TEMPERATURE: 97.9 F | WEIGHT: 175 LBS | OXYGEN SATURATION: 98 % | HEART RATE: 85 BPM | BODY MASS INDEX: 34.18 KG/M2

## 2017-11-17 DIAGNOSIS — M53.3 SACROILIAC JOINT PAIN: Primary | ICD-10-CM

## 2017-11-17 DIAGNOSIS — M53.3 SACRAL PAIN: ICD-10-CM

## 2017-11-17 DIAGNOSIS — G89.4 CHRONIC PAIN SYNDROME: ICD-10-CM
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
## 2017-11-17 NOTE — TELEPHONE ENCOUNTER
Patient states she was unable to  the Fosamax due to the pharmacy wanted a provider call. I called [facility] and spoke to the pharmacist. The pharmacist says there is no problem with this and she will get it ready for the patient.

## 2017-11-17 NOTE — MR AVS SNAPSHOT
Visit Information Date & Time Provider Department Dept. Phone Encounter #  
 11/17/2017 11:15 AM Wendie Martinez  [facility], [address] [facility] [phone] [id number] Follow-up Instructions Return for after injections, with Dr. Jackson Salazar. Follow-up and Disposition History Your Appointments 1/9/2018 10:45 AM  
Follow Up with Wendie Martinez MD  
[facility] [address] [facility]) Appt Note: BLOCK FU; WILSON 12/12/17  
 [address] [place] [address]  
[phone]  
  
   
 [address] [place] [address] Upcoming Health Maintenance Date Due  
 EYE EXAM RETINAL OR DILATED Q1 2/7/2018 LIPID PANEL Q1 2/19/2018 HEMOGLOBIN A1C Q6M 3/26/2018 FOOT EXAM Q1 3/28/2018 MICROALBUMIN Q1 3/28/2018 MEDICARE YEARLY EXAM 7/18/2018 GLAUCOMA SCREENING Q2Y 2/7/2019 BREAST CANCER SCRN MAMMOGRAM 8/11/2019 COLONOSCOPY 10/6/2019 DTaP/Tdap/Td series (2 - Td) 7/26/2026 Allergies as of 11/17/2017  Review Complete On: 11/17/2017 By: Wendie Martinez MD  
  
 Severity Noted Reaction Type Reactions Percocet [Oxycodone-acetaminophen]  10/08/2010    Rash, Itching Can Take Percocet but must take Benadryl for itching Perfume Ht52  02/05/2015    Rash Perfume specific:  MUSK Pravastatin  09/26/2017    Itching Current Immunizations  Reviewed on 4/18/2016 Name Date Influenza High Dose Vaccine PF 8/21/2017 11:43 AM, 10/19/2016  9:33 AM  
 Influenza Vaccine 10/19/2015  8:47 AM, 10/17/2013 Influenza Vaccine Whole 12/1/2008 Pneumococcal Polysaccharide (PPSV-23) 4/18/2016  1:50 PM  
 ZZZ-RETIRED (DO NOT USE) Pneumococcal Vaccine (Unspecified Type) 12/1/2008 Not reviewed this visit You Were Diagnosed With   
  
 Codes Comments Sacroiliac joint pain    -  Primary ICD-10-CM: M53.3 ICD-9-CM: 724.6 Sacral pain     ICD-10-CM: M53.3 ICD-9-CM: 724.6 Chronic pain syndrome     ICD-10-CM: G89.4 ICD-9-CM: 338. 4 Vitals Pulse Temp Weight(growth percentile) SpO2 BMI OB Status 85 97.9 °F (36.6 °C) (Oral) 175 lb (79.4 kg) 98% 34.18 kg/m2 Hysterectomy Smoking Status Current Some Day Smoker BMI and BSA Data Body Mass Index Body Surface Area  
 34.18 kg/m 2 1.83 m 2 Preferred Pharmacy Pharmacy Name Phone [facility] [address] [phone] Your Updated Medication List  
  
   
This list is accurate as of: 11/17/17 12:58 PM.  Always use your most recent med list.  
  
  
  
  
 albuterol 90 mcg/actuation inhaler Commonly known as:  PROVENTIL HFA, VENTOLIN HFA, PROAIR HFA Take 2 Puffs by inhalation every four (4) hours as needed for Wheezing. Indications: Chronic Obstructive Pulmonary Disease  
  
 alendronate 70 mg tablet Commonly known as:  FOSAMAX Take 1 Tab by mouth every seven (7) days. atorvastatin 40 mg tablet Commonly known as:  LIPITOR Take 1 Tab by mouth daily. carvedilol 3.125 mg tablet Commonly known as:  Daniela Spry Take 1 Tab by mouth two (2) times daily (with meals). clopidogrel 75 mg Tab Commonly known as:  PLAVIX Take 1 Tab by mouth daily. clotrimazole-betamethasone topical cream  
Commonly known as:  Hope Lipa Apply bid to affected area (pea-sized amount) cyanocobalamin 2,500 mcg sublingual tablet Commonly known as:  VITAMIN B12 Take 1 Tab by mouth daily. * dicyclomine 10 mg capsule Commonly known as:  BENTYL 20 mg.  
  
 * dicyclomine 20 mg tablet Commonly known as:  BENTYL  
  
 ergocalciferol 50,000 unit capsule Commonly known as:  ERGOCALCIFEROL Take 1 Cap by mouth every Sunday. glipiZIDE 10 mg tablet Commonly known as:  GLUCOTROL  
TAKE ONE TABLET BY MOUTH TWICE DAILY  
  
 * HYDROcodone-acetaminophen 5-325 mg per tablet Commonly known as:  Femi Angeles  
 Take 1 Tab by mouth two (2) times daily as needed. Max Daily Amount: 2 Tabs. For chronic back pain  
  
 * HYDROcodone-acetaminophen 5-325 mg per tablet Commonly known as:  NORCO  
1 tab PO every day to BID PRN pain  
  
 insulin regular 100 unit/mL injection Commonly known as:  NOVOLIN R, HUMULIN R  
15 Units by SubCUTAneous route three (3) times daily. insulin syringe-needle U-100 Dispense ultrafine 0.5 mL syringes with 31 gauge needle  
  
 ipratropium 0.02 % Soln Commonly known as:  ATROVENT  
2.5 mL by Nebulization route every four (4) hours (while awake). Indications: PREVENTION OF BRONCHOSPASM WITH CHRONIC BRONCHITIS  
  
 lisinopril 10 mg tablet Commonly known as:  PRINIVIL, ZESTRIL  
TAKE ONE TABLET BY MOUTH ONCE DAILY  
  
 metFORMIN 1,000 mg tablet Commonly known as:  GLUCOPHAGE  
TAKE ONE TABLET BY MOUTH TWICE DAILY WITH FOOD  
  
 pravastatin 40 mg tablet Commonly known as:  PRAVACHOL Take 40 mg by mouth daily. * Notice: This list has 4 medication(s) that are the same as other medications prescribed for you. Read the directions carefully, and ask your doctor or other care provider to review them with you. We Performed the Following SCHEDULE SURGERY [[id number] Custom] Follow-up Instructions Return for after injections, with Dr. Ti Ernst. Introducing [facility]! Dear Abhijit Harvey: 
Thank you for requesting a [employer] account. Our records indicate that you have previously registered for a Ros[employer] account but its currently inactive. Please call our [employer] support line at [phone]. Additional Information If you have questions, please visit the Frequently Asked Questions section of the [employer] website at [email or web address] Remember, [employer] is NOT to be used for urgent needs. For medical emergencies, dial 911. Now available from your iPhone and Android! Please provide this summary of care documentation to your next provider. Your primary care clinician is listed as Norma Emanuel. If you have any questions after today's visit, please call [phone].

## 2017-11-20 NOTE — TELEPHONE ENCOUNTER
She can hold it. However, please inform the patient directly that she will be at increased risk for stroke AND heart attack (she has h/o stroke and CAD) during the time period she is not taking plavix.

## 2017-11-20 NOTE — TELEPHONE ENCOUNTER
Dr. Paula Calhoun called asking if pt can hold Plavix before her Dec. 12th bilateral SI joint injection. Please advise.

## 2017-11-21 ENCOUNTER — TELEPHONE (OUTPATIENT)
Dept: ORTHOPEDIC SURGERY | Age: 67
End: 2017-11-21

## 2017-11-21 NOTE — TELEPHONE ENCOUNTER
She has to be off anti-platelets for this procedure. There is nothing that can substitute. She has to weigh the risks of coming off plavix with the benefit of getting an injection for pain control.

## 2017-11-21 NOTE — TELEPHONE ENCOUNTER
Received call ok to hold plavix 7 days prior to injection per Rei Lloyd, [email or web address] Contacted patient to reiterate dates Plavix begin holding tues 12/5/17, Excedrine thus 12/7/17. Patient verbalized understanding.

## 2017-11-21 NOTE — TELEPHONE ENCOUNTER
Spoke with nurse Jelena Soto, verifying pt is to hold the Plavix 7 days before the procedure. Spoke with patient again, made her aware there is no substitute she can take. She verbalized understanding. She asked if she can take the Plavix after the procedure. Dr. Jocelyn Schreiber verified pt can take it later that day. Pt verbalized understanding.

## 2017-11-21 NOTE — TELEPHONE ENCOUNTER
Left msg for nurse Randee Ducking that holding is okay but pt will be informed of risks. Called pt, she states their office said to hold it for 7 days. She is uneasy about the risks. She asks if there is something else she can take in the meantime while she is holding the Plavix.

## 2017-12-06 NOTE — TELEPHONE ENCOUNTER
Patient is requesting a med refill metformain 1000 mg    Last visit: 9/26/17    Last refill: 4/14/17

## 2017-12-07 RX ORDER — METFORMIN HYDROCHLORIDE 1000 MG/1
TABLET ORAL
Qty: 180 TAB | Refills: 0 | Status: SHIPPED | OUTPATIENT
Start: 2017-12-07 | End: 2018-04-02 | Stop reason: SDUPTHER

## 2017-12-12 ENCOUNTER — HOSPITAL ENCOUNTER (OUTPATIENT)
Age: 67
Setting detail: OUTPATIENT SURGERY
Discharge: HOME | End: 2017-12-12
Attending: PHYSICAL MEDICINE & REHABILITATION | Admitting: PHYSICAL MEDICINE & REHABILITATION
Payer: MEDICARE

## 2017-12-12 ENCOUNTER — APPOINTMENT (OUTPATIENT)
Dept: GENERAL RADIOLOGY | Age: 67
End: 2017-12-12
Attending: PHYSICAL MEDICINE & REHABILITATION
Payer: MEDICARE

## 2017-12-12 VITALS
HEIGHT: 60 IN | RESPIRATION RATE: 14 BRPM | TEMPERATURE: 97.4 F | DIASTOLIC BLOOD PRESSURE: 83 MMHG | WEIGHT: 175 LBS | OXYGEN SATURATION: 96 % | SYSTOLIC BLOOD PRESSURE: 121 MMHG | HEART RATE: 90 BPM | BODY MASS INDEX: 34.36 KG/M2

## 2017-12-12 LAB — GLUCOSE BLD STRIP.AUTO-MCNC: 71 MG/DL (ref 70–110)

## 2017-12-12 PROCEDURE — 74011250637 HC RX REV CODE- 250/637: Performed by: PHYSICAL MEDICINE & REHABILITATION

## 2017-12-12 PROCEDURE — 76010000009 HC PAIN MGT 0 TO 30 MIN PROC: Performed by: PHYSICAL MEDICINE & REHABILITATION

## 2017-12-12 PROCEDURE — 74011000250 HC RX REV CODE- 250

## 2017-12-12 PROCEDURE — 82962 GLUCOSE BLOOD TEST: CPT

## 2017-12-12 PROCEDURE — 74011250636 HC RX REV CODE- 250/636: Performed by: PHYSICAL MEDICINE & REHABILITATION

## 2017-12-12 PROCEDURE — 74011636320 HC RX REV CODE- 636/320

## 2017-12-12 PROCEDURE — 74011636320 HC RX REV CODE- 636/320: Performed by: PHYSICAL MEDICINE & REHABILITATION

## 2017-12-12 PROCEDURE — 74011250636 HC RX REV CODE- 250/636

## 2017-12-12 PROCEDURE — 74011000250 HC RX REV CODE- 250: Performed by: PHYSICAL MEDICINE & REHABILITATION

## 2017-12-12 RX ORDER — DIAZEPAM 5 MG/1
5-20 TABLET ORAL ONCE
Status: COMPLETED | OUTPATIENT
Start: 2017-12-12 | End: 2017-12-12

## 2017-12-12 RX ORDER — LIDOCAINE HYDROCHLORIDE 10 MG/ML
INJECTION, SOLUTION EPIDURAL; INFILTRATION; INTRACAUDAL; PERINEURAL AS NEEDED
Status: DISCONTINUED | OUTPATIENT
Start: 2017-12-12 | End: 2017-12-12 | Stop reason: HOSPADM

## 2017-12-12 RX ORDER — DEXAMETHASONE SODIUM PHOSPHATE 100 MG/10ML
INJECTION INTRAMUSCULAR; INTRAVENOUS AS NEEDED
Status: DISCONTINUED | OUTPATIENT
Start: 2017-12-12 | End: 2017-12-12 | Stop reason: HOSPADM

## 2017-12-12 RX ADMIN — DIAZEPAM 10 MG: 5 TABLET ORAL at 14:42

## 2017-12-12 NOTE — INTERVAL H&P NOTE
H&P Update:  Deandra Duarte was seen and briefly examined. History and physical has been reviewed.  There have been no significant clinical changes since the completion of the originally dated History and Physical.    Signed By: [address], MD     December 12, 2017 3:06 PM

## 2017-12-12 NOTE — DISCHARGE INSTRUCTIONS
[facility]      Post Procedures Instructions    *Resume Diet and Activity as tolerated. Rest for the remainder of the day. *You may fell worse before you feel better as the numbing medications wear off before the steroids take effect if used for your procedures. *Do not use affected extremity until numbness or loss of sensation has completely resolved without assistance. *DO NOT DRIVE, operate machinery/heavey equipment for 24 hours. *DO NOT DRINK ALCOHOL for 24 hours as it may interact with the sedation if you received it and also thins your blood and may cause you to bleed. *WAIT 24 hours before starting back ANY Blood thinning medications:   (Heparin, Coumadin, Warfarin, Lovenox, Plavix, Aggrenox)    *Resume Pre-Procedure Medications as prescribed except Blood Thinners unless directed by your Physician or Cardiologist.     *Avoid Hot tubs and Heating pad for 24 hours to prevent dissipation of medications, you may shower to remove bandages and remaining prep residue on the skin. * If you develop a Headache, drink plenty of fluids including beverages with caffeine (Coffee, Mt. Dew etc.) and rest.  If the headache persists longer than 24 hoursor intensifies - Please call [facility] ([facility]) [phone]    * If you are DIABETIC, check your blood sugar three times a day for the next three days, the steroids will increase your blood sugar. If your blood sugar is greater than 400 have someone drive you to the nearest [address]. * If you experience any of the following problems, call the [facility] [phone] between 8:00 am - 4:30pm or After Hours [phone].     Shortness of breath    Fever of 101 F or higher    Nausea / Vomiting (not normal to you)    Increasing stiffness in the neck    Weakness or numbness in the arms or legs that is not resolving    Prolonged and increasing pain > than 4 days    ANYTHING OUT of the ORDINARY TO YOU    If YOU are experiencing a severe reaction / complication that you have never had before post procedure, call 911 or go to the nearest emergency room! All patients must have a  for transportation [place] regardless if you do or do not receive sedation. DISCHARGE SUMMARY from Nurse      PATIENT INSTRUCTIONS:    After Oral  or intravenous sedation, for 24 hours or while taking prescription Narcotics:  · Limit your activities  · Do not drive and operate hazardous machinery  · Do not make important personal or business decisions  · Do  not drink alcoholic beverages  · If you have not urinated within 8 hours after discharge, please contact your surgeon on call. Report the following to your surgeon:  · Excessive pain, swelling, redness or odor of or around the surgical area  · Temperature over 101  · Nausea and vomiting lasting longer than 4 hours or if unable to take medications  · Any signs of decreased circulation or nerve impairment to extremity: change in color, persistent  numbness, tingling, coldness or increase pain  · Any questions        What to do at Home:  Recommended activity: Activity as tolerated, NO DRIVING FOR 24 Hours post injection          *  Please give a list of your current medications to your Primary Care Provider. *  Please update this list whenever your medications are discontinued, doses are      changed, or new medications (including over-the-counter products) are added. *  Please carry medication information at all times in case of emergency situations. These are general instructions for a healthy lifestyle:    No smoking/ No tobacco products/ Avoid exposure to second hand smoke    Surgeon General's Warning:  Quitting smoking now greatly reduces serious risk to your health.     Obesity, smoking, and sedentary lifestyle greatly increases your risk for illness    A healthy diet, regular physical exercise & weight monitoring are important for maintaining a healthy lifestyle    You may be retaining fluid if you have a history of heart failure or if you experience any of the following symptoms:  Weight gain of 3 pounds or more overnight or 5 pounds in a week, increased swelling in our hands or feet or shortness of breath while lying flat in bed. Please call your doctor as soon as you notice any of these symptoms; do not wait until your next office visit. Recognize signs and symptoms of STROKE:    F-face looks uneven    A-arms unable to move or move unevenly    S-speech slurred or non-existent    T-time-call 911 as soon as signs and symptoms begin-DO NOT go       Back to bed or wait to see if you get better-TIME IS BRAIN.
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
## 2017-12-15 NOTE — TELEPHONE ENCOUNTER
Received faxed request from [facility] for Patty plus meter, test strips, and lancets.

## 2017-12-18 ENCOUNTER — TELEPHONE (OUTPATIENT)
Dept: ORTHOPEDIC SURGERY | Age: 67
End: 2017-12-18

## 2017-12-18 ENCOUNTER — TELEPHONE (OUTPATIENT)
Dept: FAMILY MEDICINE CLINIC | Age: 67
End: 2017-12-18

## 2017-12-18 RX ORDER — BLOOD-GLUCOSE METER
EACH MISCELLANEOUS
Qty: 1 EACH | Refills: 0 | Status: SHIPPED | OUTPATIENT
Start: 2017-12-18 | End: 2019-01-15 | Stop reason: ALTCHOICE

## 2017-12-18 RX ORDER — LANCETS
EACH MISCELLANEOUS
Qty: 200 EACH | Refills: 3 | Status: SHIPPED | OUTPATIENT
Start: 2017-12-18 | End: 2018-07-25 | Stop reason: ALTCHOICE

## 2017-12-18 NOTE — TELEPHONE ENCOUNTER
Spoke with patient, she states that is fine following up with Dr. Raquel Archer, however that appt is not until 1/9/2018. At the moment she has sharp stabbing pain radiating down her lower back, and across her thigh, making it difficult for her to walk. She states it is unbearable to walk. Patient stated that she will try to wait for a call back on what to do, but may have to go to the ED. Informed the patient that it was after hours, and she may not get a return call until morning so if need be to please go to the ED. She stated she would give us until 9am to advise her on what to do. Please advise.

## 2017-12-18 NOTE — TELEPHONE ENCOUNTER
Patient called in requesting a call back, states that she had a spinal block done on 12/12/17 and she is now in more pain than before. Please contact patient at [phone].

## 2017-12-18 NOTE — TELEPHONE ENCOUNTER
Pt called in tears stating she had a \"steroid block\" done last week that has not helped her back pain at all. Pt states pain radiates down back, across hip, and down leg. Pt states she called Ortho and Dr. Brandon Velazquez nurse advised that she address the pain at her f/u appt with Dr. Ana Tobar in January. Pt states she cannot walk and is in need of pain medications.  Please advise

## 2017-12-18 NOTE — TELEPHONE ENCOUNTER
Per his note, this was both a diagnostic and therapeutic block if it worked. Unfortunately, the block did not help. I would recommend she keep her FU as scheduled with Dr. Kevin Herr so they can discuss the next step for treatment.

## 2017-12-18 NOTE — TELEPHONE ENCOUNTER
Needs to be eval by ER if she can't walk. Alternatively, she can make an appointment for eval.  May not necessarily be prescribed pain meds though.

## 2017-12-19 NOTE — TELEPHONE ENCOUNTER
Spoke with patient, she was in route to the ED, stated that it just feels like something on her hip is shifting. It is painful and did not do that prior to the block. Informed patient to please update us after she leaves.

## 2017-12-21 ENCOUNTER — TELEPHONE (OUTPATIENT)
Dept: FAMILY MEDICINE CLINIC | Age: 67
End: 2017-12-21

## 2017-12-21 DIAGNOSIS — E11.49 TYPE 2 DIABETES MELLITUS WITH OTHER NEUROLOGIC COMPLICATION, WITH LONG-TERM CURRENT USE OF INSULIN (HCC): Primary | ICD-10-CM

## 2017-12-21 DIAGNOSIS — Z79.4 TYPE 2 DIABETES MELLITUS WITH OTHER NEUROLOGIC COMPLICATION, WITH LONG-TERM CURRENT USE OF INSULIN (HCC): ICD-10-CM

## 2017-12-21 DIAGNOSIS — Z79.4 TYPE 2 DIABETES MELLITUS WITH OTHER NEUROLOGIC COMPLICATION, WITH LONG-TERM CURRENT USE OF INSULIN (HCC): Primary | ICD-10-CM

## 2017-12-21 DIAGNOSIS — E11.49 TYPE 2 DIABETES MELLITUS WITH OTHER NEUROLOGIC COMPLICATION, WITH LONG-TERM CURRENT USE OF INSULIN (HCC): ICD-10-CM

## 2017-12-21 RX ORDER — ISOPROPYL ALCOHOL 70 ML/100ML
SWAB TOPICAL
Qty: 300 PAD | Refills: 3 | Status: SHIPPED | OUTPATIENT
Start: 2017-12-21 | End: 2019-01-15 | Stop reason: ALTCHOICE

## 2017-12-21 RX ORDER — ISOPROPYL ALCOHOL 70 ML/100ML
SWAB TOPICAL
Qty: 300 PAD | Refills: 3 | Status: CANCELLED | OUTPATIENT
Start: 2017-12-21

## 2017-12-21 NOTE — TELEPHONE ENCOUNTER
[facility] called requesting a prescription for the following items to go with the diabetic testing supplies.     \"Accucheck Patty Control Solution\"  \"alcohol swabs\"    Diagnosis codes need to be on rx

## 2018-01-02 DIAGNOSIS — Z79.4 TYPE 2 DIABETES MELLITUS WITH DIABETIC AUTONOMIC NEUROPATHY, WITH LONG-TERM CURRENT USE OF INSULIN (HCC): ICD-10-CM

## 2018-01-02 DIAGNOSIS — E11.43 TYPE 2 DIABETES MELLITUS WITH DIABETIC AUTONOMIC NEUROPATHY, WITH LONG-TERM CURRENT USE OF INSULIN (HCC): ICD-10-CM

## 2018-01-02 RX ORDER — GLIPIZIDE 10 MG/1
TABLET ORAL
Qty: 180 TAB | Refills: 0 | Status: SHIPPED | OUTPATIENT
Start: 2018-01-02 | End: 2018-05-14 | Stop reason: SDUPTHER

## 2018-01-09 ENCOUNTER — OFFICE VISIT (OUTPATIENT)
Dept: ORTHOPEDIC SURGERY | Age: 68
End: 2018-01-09

## 2018-01-09 VITALS
RESPIRATION RATE: 16 BRPM | HEART RATE: 106 BPM | TEMPERATURE: 98.3 F | DIASTOLIC BLOOD PRESSURE: 72 MMHG | OXYGEN SATURATION: 97 % | SYSTOLIC BLOOD PRESSURE: 126 MMHG | HEIGHT: 60 IN

## 2018-01-09 DIAGNOSIS — M54.16 LEFT LUMBAR RADICULOPATHY: Primary | ICD-10-CM

## 2018-01-09 PROBLEM — M53.3 SACROILIAC JOINT PAIN: Status: ACTIVE | Noted: 2018-01-09

## 2018-01-09 PROBLEM — E11.21 TYPE 2 DIABETES MELLITUS WITH NEPHROPATHY (HCC): Status: ACTIVE | Noted: 2018-01-09

## 2018-01-09 PROBLEM — F33.9 RECURRENT DEPRESSION (HCC): Status: ACTIVE | Noted: 2018-01-09

## 2018-01-09 RX ORDER — CHOLECALCIFEROL (VITAMIN D3) 125 MCG
CAPSULE ORAL
COMMUNITY
End: 2018-09-11 | Stop reason: ALTCHOICE

## 2018-01-09 RX ORDER — DULOXETIN HYDROCHLORIDE 30 MG/1
30 CAPSULE, DELAYED RELEASE ORAL DAILY
Qty: 60 CAP | Refills: 2 | Status: SHIPPED | OUTPATIENT
Start: 2018-01-09 | End: 2018-02-28 | Stop reason: ALTCHOICE
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
## 2018-01-17 DIAGNOSIS — I10 ESSENTIAL HYPERTENSION: ICD-10-CM

## 2018-01-17 DIAGNOSIS — I25.10 CORONARY ARTERY DISEASE INVOLVING NATIVE HEART WITHOUT ANGINA PECTORIS, UNSPECIFIED VESSEL OR LESION TYPE: ICD-10-CM

## 2018-01-17 RX ORDER — CARVEDILOL 3.12 MG/1
TABLET ORAL
Qty: 180 TAB | Refills: 0 | Status: SHIPPED | OUTPATIENT
Start: 2018-01-17 | End: 2018-02-14 | Stop reason: SDUPTHER

## 2018-01-23 ENCOUNTER — HOSPITAL ENCOUNTER (OUTPATIENT)
Dept: MRI IMAGING | Age: 68
Discharge: HOME | End: 2018-01-23
Payer: MEDICARE

## 2018-01-23 VITALS — BODY MASS INDEX: 35.15 KG/M2 | WEIGHT: 180 LBS

## 2018-01-23 DIAGNOSIS — M54.16 LEFT LUMBAR RADICULOPATHY: ICD-10-CM

## 2018-01-23 PROCEDURE — 74011250636 HC RX REV CODE- 250/636

## 2018-01-23 PROCEDURE — 72158 MRI LUMBAR SPINE W/O & W/DYE: CPT

## 2018-01-23 PROCEDURE — A9577 INJ MULTIHANCE: HCPCS

## 2018-01-23 RX ADMIN — GADOBENATE DIMEGLUMINE 15 ML: 529 INJECTION, SOLUTION INTRAVENOUS at 12:21

## 2018-01-24 ENCOUNTER — OFFICE VISIT (OUTPATIENT)
Dept: ORTHOPEDIC SURGERY | Age: 68
End: 2018-01-24

## 2018-01-24 VITALS
HEIGHT: 60 IN | OXYGEN SATURATION: 95 % | TEMPERATURE: 97.7 F | RESPIRATION RATE: 16 BRPM | SYSTOLIC BLOOD PRESSURE: 144 MMHG | HEART RATE: 91 BPM | BODY MASS INDEX: 35.14 KG/M2 | WEIGHT: 179 LBS | DIASTOLIC BLOOD PRESSURE: 73 MMHG

## 2018-01-24 DIAGNOSIS — M96.0 PSEUDARTHROSIS FOLLOWING SPINAL FUSION: Primary | ICD-10-CM
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
## 2018-01-26 ENCOUNTER — TELEPHONE (OUTPATIENT)
Dept: ORTHOPEDIC SURGERY | Age: 68
End: 2018-01-26

## 2018-01-29 DIAGNOSIS — Z87.81 HISTORY OF COMPRESSION FRACTURE OF SPINE: ICD-10-CM

## 2018-01-29 DIAGNOSIS — M53.3 SACRAL PAIN: ICD-10-CM

## 2018-01-29 DIAGNOSIS — M96.1 LUMBAR POST-LAMINECTOMY SYNDROME: ICD-10-CM

## 2018-02-09 ENCOUNTER — HOSPITAL ENCOUNTER (OUTPATIENT)
Dept: GENERAL RADIOLOGY | Age: 68
Discharge: HOME | End: 2018-02-09
Payer: MEDICARE

## 2018-02-09 ENCOUNTER — TELEPHONE (OUTPATIENT)
Dept: ORTHOPEDIC SURGERY | Age: 68
End: 2018-02-09

## 2018-02-09 ENCOUNTER — HOSPITAL ENCOUNTER (OUTPATIENT)
Dept: PREADMISSION TESTING | Age: 68
Discharge: HOME | End: 2018-02-09
Payer: MEDICARE

## 2018-02-09 DIAGNOSIS — M96.0 PSEUDARTHROSIS FOLLOWING SPINAL FUSION: ICD-10-CM

## 2018-02-09 LAB
ALBUMIN SERPL-MCNC: 3.5 G/DL (ref 3.4–5)
ALBUMIN/GLOB SERPL: 1 {RATIO} (ref 0.8–1.7)
ALP SERPL-CCNC: 111 U/L (ref 45–117)
ALT SERPL-CCNC: 25 U/L (ref 13–56)
ANION GAP SERPL CALC-SCNC: 11 MMOL/L (ref 3–18)
AST SERPL-CCNC: 13 U/L (ref 15–37)
BILIRUB SERPL-MCNC: 0.4 MG/DL (ref 0.2–1)
BUN SERPL-MCNC: 29 MG/DL (ref 7–18)
BUN/CREAT SERPL: 28 (ref 12–20)
CALCIUM SERPL-MCNC: 9.2 MG/DL (ref 8.5–10.1)
CHLORIDE SERPL-SCNC: 98 MMOL/L (ref 100–108)
CO2 SERPL-SCNC: 25 MMOL/L (ref 21–32)
CREAT SERPL-MCNC: 1.05 MG/DL (ref 0.6–1.3)
ERYTHROCYTE [DISTWIDTH] IN BLOOD BY AUTOMATED COUNT: 14.2 % (ref 11.6–14.5)
GLOBULIN SER CALC-MCNC: 3.4 G/DL (ref 2–4)
GLUCOSE SERPL-MCNC: 475 MG/DL (ref 74–99)
HCT VFR BLD AUTO: 34.2 % (ref 35–45)
HGB BLD-MCNC: 11.2 G/DL (ref 12–16)
MCH RBC QN AUTO: 30.2 PG (ref 24–34)
MCHC RBC AUTO-ENTMCNC: 32.7 G/DL (ref 31–37)
MCV RBC AUTO: 92.2 FL (ref 74–97)
PLATELET # BLD AUTO: 308 K/UL (ref 135–420)
PMV BLD AUTO: 9.2 FL (ref 9.2–11.8)
POTASSIUM SERPL-SCNC: 4.7 MMOL/L (ref 3.5–5.5)
PROT SERPL-MCNC: 6.9 G/DL (ref 6.4–8.2)
RBC # BLD AUTO: 3.71 M/UL (ref 4.2–5.3)
SODIUM SERPL-SCNC: 134 MMOL/L (ref 136–145)
WBC # BLD AUTO: 6.5 K/UL (ref 4.6–13.2)

## 2018-02-09 PROCEDURE — 80053 COMPREHEN METABOLIC PANEL: CPT | Performed by: ORTHOPAEDIC SURGERY

## 2018-02-09 PROCEDURE — 85027 COMPLETE CBC AUTOMATED: CPT | Performed by: ORTHOPAEDIC SURGERY

## 2018-02-09 PROCEDURE — 71046 X-RAY EXAM CHEST 2 VIEWS: CPT

## 2018-02-09 PROCEDURE — 36415 COLL VENOUS BLD VENIPUNCTURE: CPT | Performed by: ORTHOPAEDIC SURGERY

## 2018-02-09 NOTE — TELEPHONE ENCOUNTER
Patient identified self on voice message, left message informing Glucose level is high and patient needed to be evaluated at the ER or PCP. Any questions please call the office.

## 2018-02-09 NOTE — TELEPHONE ENCOUNTER
Please call patient and let her know her BS is 475. She needs to be evaluated by her PCP or the ER as this level is very high and dangerous.

## 2018-02-09 NOTE — TELEPHONE ENCOUNTER
Paco Vazquez from [facility] called to report patient GLUCOSE was 475. Please advise her at [phone] if needed .

## 2018-02-14 ENCOUNTER — OFFICE VISIT (OUTPATIENT)
Dept: FAMILY MEDICINE CLINIC | Age: 68
End: 2018-02-14

## 2018-02-14 VITALS
DIASTOLIC BLOOD PRESSURE: 80 MMHG | TEMPERATURE: 97.9 F | HEIGHT: 60 IN | OXYGEN SATURATION: 95 % | HEART RATE: 91 BPM | BODY MASS INDEX: 35.73 KG/M2 | SYSTOLIC BLOOD PRESSURE: 140 MMHG | WEIGHT: 182 LBS | RESPIRATION RATE: 20 BRPM

## 2018-02-14 DIAGNOSIS — I25.10 CORONARY ARTERY DISEASE INVOLVING NATIVE HEART WITHOUT ANGINA PECTORIS, UNSPECIFIED VESSEL OR LESION TYPE: ICD-10-CM

## 2018-02-14 DIAGNOSIS — I50.32 CHRONIC DIASTOLIC CONGESTIVE HEART FAILURE (HCC): ICD-10-CM

## 2018-02-14 DIAGNOSIS — Z86.73 HISTORY OF STROKE: ICD-10-CM

## 2018-02-14 DIAGNOSIS — Z79.4 TYPE 2 DIABETES MELLITUS WITH DIABETIC AUTONOMIC NEUROPATHY, WITH LONG-TERM CURRENT USE OF INSULIN (HCC): ICD-10-CM

## 2018-02-14 DIAGNOSIS — I10 HYPERTENSION, UNCONTROLLED: ICD-10-CM

## 2018-02-14 DIAGNOSIS — J41.0 SIMPLE CHRONIC BRONCHITIS (HCC): ICD-10-CM

## 2018-02-14 DIAGNOSIS — E78.00 PURE HYPERCHOLESTEROLEMIA: ICD-10-CM

## 2018-02-14 DIAGNOSIS — E11.43 TYPE 2 DIABETES MELLITUS WITH DIABETIC AUTONOMIC NEUROPATHY, WITH LONG-TERM CURRENT USE OF INSULIN (HCC): ICD-10-CM

## 2018-02-14 PROBLEM — E11.21 TYPE 2 DIABETES MELLITUS WITH NEPHROPATHY (HCC): Status: RESOLVED | Noted: 2018-01-09 | Resolved: 2018-02-14

## 2018-02-14 PROBLEM — F33.9 RECURRENT DEPRESSION (HCC): Status: RESOLVED | Noted: 2018-01-09 | Resolved: 2018-02-14

## 2018-02-14 PROBLEM — N18.30 STAGE 3 CHRONIC KIDNEY DISEASE (HCC): Status: ACTIVE | Noted: 2018-02-14

## 2018-02-14 LAB — HBA1C MFR BLD HPLC: 8.8 %

## 2018-02-14 RX ORDER — CARVEDILOL 6.25 MG/1
TABLET ORAL
Qty: 180 TAB | Refills: 0 | Status: SHIPPED | OUTPATIENT
Start: 2018-02-14 | End: 2018-06-12 | Stop reason: SDUPTHER

## 2018-02-14 RX ORDER — DIAZEPAM 5 MG/1
5 TABLET ORAL
COMMUNITY
End: 2019-01-15 | Stop reason: ALTCHOICE
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
## 2018-02-14 NOTE — MR AVS SNAPSHOT
Ramesh Galvez 
 
 
 [address] [facility] [address] [phone] Patient: Alfredo Fallon MRN: [id number] Visit Information Date & Time Provider Department Dept. Phone Encounter #  
 2/14/2018  8:30 AM Alvarez Kent, [place] [phone] [id number] Your Appointments 3/5/2018  2:00 PM  
POST OP with Florence Landrum NP  
[facility] Appt Note: sx 2-19  
 [address] [facility] [address]  
[phone]  
  
   
 [address] [facility],[address] [place] [address]  
  
    
 4/3/2018 11:30 AM  
Follow Up with Sinai Irizarry MD  
[facility]) Appt Note: 6 wk fu sx 2-19; 6wk fu sx 2-19  
 [address] [facility] [address]  
[phone]  
  
   
 [address] [facility] [address] Upcoming Health Maintenance Date Due  
 EYE EXAM RETINAL OR DILATED Q1 2/7/2018 LIPID PANEL Q1 2/19/2018 HEMOGLOBIN A1C Q6M 3/26/2018 FOOT EXAM Q1 3/28/2018 MICROALBUMIN Q1 3/28/2018 MEDICARE YEARLY EXAM 7/18/2018 GLAUCOMA SCREENING Q2Y 2/7/2019 BREAST CANCER SCRN MAMMOGRAM 8/11/2019 COLONOSCOPY 10/6/2019 DTaP/Tdap/Td series (2 - Td) 7/26/2026 Allergies as of 2/14/2018  Review Complete On: 2/14/2018 By: Alvarez Kent MD  
  
 Severity Noted Reaction Type Reactions Percocet [Oxycodone-acetaminophen]  10/08/2010    Rash, Itching Can Take Percocet but must take Benadryl for itching Perfume Ht52  02/05/2015    Rash Perfume specific:  MUSK Pravastatin  09/26/2017    Itching Not sure Current Immunizations  Reviewed on 4/18/2016 Name Date Influenza High Dose Vaccine PF 8/21/2017 11:43 AM, 10/19/2016  9:33 AM  
 Influenza Vaccine 10/19/2015  8:47 AM, 10/17/2013 Influenza Vaccine Whole 12/1/2008 Pneumococcal Polysaccharide (PPSV-23) 4/18/2016  1:50 PM  
 ZZZ-RETIRED (DO NOT USE) Pneumococcal Vaccine (Unspecified Type) 12/1/2008 Not reviewed this visit You Were Diagnosed With   
  
 Codes Comments Uncontrolled type 2 diabetes mellitus with other neurologic complication, with long-term current use of insulin (HCC)    -  Primary ICD-10-CM: E11.49, Z79.4, E11.65 ICD-9-CM: 250.62, V58.67 Hypertension, uncontrolled     ICD-10-CM: I10 
ICD-9-CM: 401.9 Pure hypercholesterolemia     ICD-10-CM: E78.00 ICD-9-CM: 272.0 Simple chronic bronchitis (HCC)     ICD-10-CM: J41.0 ICD-9-CM: 491.0 Chronic diastolic congestive heart failure (HCC)     ICD-10-CM: I50.32 
ICD-9-CM: 428.32, 428.0 History of stroke     ICD-10-CM: Z86.73 
ICD-9-CM: V12.54 Coronary artery disease involving native heart without angina pectoris, unspecified vessel or lesion type     ICD-10-CM: I25.10 ICD-9-CM: 414.01 Type 2 diabetes mellitus with diabetic autonomic neuropathy, with long-term current use of insulin (HCC)     ICD-10-CM: E11.43, Z79.4 ICD-9-CM: 250.60, 337.1, V58.67 Vitals BP Pulse Temp Resp Height(growth percentile) Weight(growth percentile) 140/80 91 97.9 °F (36.6 °C) (Oral) 20 5' (1.524 m) 182 lb (82.6 kg) SpO2 BMI OB Status Smoking Status 95% 35.54 kg/m2 Hysterectomy Current Some Day Smoker Vitals History BMI and BSA Data Body Mass Index Body Surface Area 35.54 kg/m 2 1.87 m 2 Preferred Pharmacy Pharmacy Name Phone [address] [phone] Your Updated Medication List  
  
   
This list is accurate as of: 2/14/18  9:00 AM.  Always use your most recent med list.  
  
  
  
  
 albuterol 90 mcg/actuation inhaler Commonly known as:  PROVENTIL HFA, VENTOLIN HFA, PROAIR HFA Take 2 Puffs by inhalation every four (4) hours as needed for Wheezing. Indications: Chronic Obstructive Pulmonary Disease  
  
 alcohol swabs Padm Test bs daily. E11.65  
  
 alendronate 70 mg tablet Commonly known as:  FOSAMAX Take 1 Tab by mouth every seven (7) days. ALEVE 220 mg Cap Generic drug:  naproxen sodium Take  by mouth. atorvastatin 40 mg tablet Commonly known as:  LIPITOR Take 1 Tab by mouth daily. Blood Glucose Control High&Low Soln Commonly known as:  ACCU-CHEK KEVIN CONTROL SOLN  
Use as directed. E11.65 Blood-Glucose Meter Misc Commonly known as:  ACCU-CHEK KEVIN PLUS METER Check blood glucose twice daily and as needed. E11.49  
  
 carvedilol 6.25 mg tablet Commonly known as:  COREG  
TAKE ONE TABLET BY MOUTH TWICE DAILY WITH MEALS  
  
 clopidogrel 75 mg Tab Commonly known as:  PLAVIX Take 1 Tab by mouth daily. clotrimazole-betamethasone topical cream  
Commonly known as:  Nolberto Mince Apply bid to affected area (pea-sized amount) cyanocobalamin 2,500 mcg sublingual tablet Commonly known as:  VITAMIN B12 Take 1 Tab by mouth daily. diazePAM 5 mg tablet Commonly known as:  VALIUM Take 5 mg by mouth every six (6) hours as needed for Anxiety. dicyclomine 20 mg tablet Commonly known as:  BENTYL DULoxetine 30 mg capsule Commonly known as:  CYMBALTA Take 1 Cap by mouth daily. Take 1 Tab QHS x1 week, then increase to 2Tab QHS  
  
 ergocalciferol 50,000 unit capsule Commonly known as:  ERGOCALCIFEROL Take 1 Cap by mouth every Sunday. gabapentin 100 mg capsule Commonly known as:  NEURONTIN Take 100 mg by mouth daily. glipiZIDE 10 mg tablet Commonly known as:  GLUCOTROL  
TAKE ONE TABLET BY MOUTH TWICE DAILY  
  
 glucose blood VI test strips strip Commonly known as:  ACCU-CHEK KEVIN PLUS TEST STRP Check blood glucose twice daily and as needed. E11.49 HYDROcodone-acetaminophen 5-325 mg per tablet Commonly known as:  Yina Apa  
 Take 1 Tab by mouth every four (4) hours as needed for Pain. insulin regular 100 unit/mL injection Commonly known as:  NOVOLIN R, HUMULIN R  
20 Units by SubCUTAneous route three (3) times daily. insulin syringe-needle U-100 Dispense ultrafine 0.5 mL syringes with 31 gauge needle  
  
 ipratropium 0.02 % Soln Commonly known as:  ATROVENT  
2.5 mL by Nebulization route every four (4) hours (while awake). Indications: PREVENTION OF BRONCHOSPASM WITH CHRONIC BRONCHITIS Iron 325 mg (65 mg iron) tablet Generic drug:  ferrous sulfate Take 325 mg by mouth Daily (before breakfast). Indications: Iron Deficiency Anemia Lancets Misc Commonly known as:  ACCU-CHEK SOFTCLIX LANCETS Check blood glucose twice daily and as needed. E11.49  
  
 lisinopril 10 mg tablet Commonly known as:  PRINIVIL, ZESTRIL  
TAKE ONE TABLET BY MOUTH ONCE DAILY  
  
 metFORMIN 1,000 mg tablet Commonly known as:  GLUCOPHAGE  
TAKE ONE TABLET BY MOUTH TWICE DAILY WITH FOOD  
  
 pravastatin 40 mg tablet Commonly known as:  PRAVACHOL Take 40 mg by mouth daily. Prescriptions Sent to Pharmacy Refills  
 carvedilol (COREG) 6.25 mg tablet 0 Sig: TAKE ONE TABLET BY MOUTH TWICE DAILY WITH MEALS Class: Normal  
 Pharmacy: [address] [place] [address] #: [phone] insulin regular (NOVOLIN R, HUMULIN R) 100 unit/mL injection 0 Si Units by SubCUTAneous route three (3) times daily. Class: Normal  
 Pharmacy: [address] [place] [address] #: [phone] Route: SubCUTAneous We Performed the Following AMB POC HEMOGLOBIN A1C [14350 CPT(R)]  DIABETES FOOT EXAM [HM7 Custom] To-Do List   
 2018 Lab:  LIPID PANEL   
  
 2018 Lab:  MICROALBUMIN, UR, RAND W/ MICROALBUMIN/CREA RATIO Introducing [facility]!    
 Dear Surendra Rodríguez: 
 Thank you for requesting a [facility] [employer] account. Our records indicate that you have previously registered for a [employer] account but its currently inactive. Please call our [employer] support line at [phone]. Additional Information If you have questions, please visit the Frequently Asked Questions section of the [employer] website at [email or web address] Remember, [employer] is NOT to be used for urgent needs. For medical emergencies, dial 911. Now available from your iPhone and Android! Please provide this summary of care documentation to your next provider. Your primary care clinician is listed as Norma Emanuel. If you have any questions after today's visit, please call [phone].

## 2018-02-14 NOTE — Clinical Note
I'm recommending her surgery be rescheduled for a month from now. We need to optimize bp and sugars.

## 2018-02-14 NOTE — PROGRESS NOTES
Celena Cutler is a 78 y.o. female (: 1950) presenting to address:    Chief Complaint   Patient presents with    Pre-op Exam       Vitals:    18 0820   BP: 140/80   Pulse: 91   Resp: 20   Temp: 97.9 °F (36.6 °C)   TempSrc: Oral   SpO2: 95%   Weight: 182 lb (82.6 kg)   Height: 5' (1.524 m)   PainSc:   4   PainLoc: Back       Hearing/Vision:   No exam data present    Learning Assessment:     Learning Assessment 10/22/2015   PRIMARY LEARNER Patient   HIGHEST LEVEL OF EDUCATION - PRIMARY LEARNER  -   BARRIERS PRIMARY LEARNER -   CO-LEARNER CAREGIVER -   PRIMARY LANGUAGE ENGLISH   LEARNER PREFERENCE PRIMARY READING   ANSWERED BY pt   RELATIONSHIP SELF     Depression Screening:     PHQ over the last two weeks 2017   Little interest or pleasure in doing things Not at all   Feeling down, depressed or hopeless Not at all   Total Score PHQ 2 0   Trouble falling or staying asleep, or sleeping too much -   Feeling tired or having little energy -   Poor appetite or overeating -   Feeling bad about yourself - or that you are a failure or have let yourself or your family down -   Trouble concentrating on things such as school, work, reading or watching TV -   Moving or speaking so slowly that other people could have noticed; or the opposite being so fidgety that others notice -   Thoughts of being better off dead, or hurting yourself in some way -   PHQ 9 Score -   How difficult have these problems made it for you to do your work, take care of your home and get along with others -     Fall Risk Assessment:     Fall Risk Assessment, last 12 mths 2018   Able to walk? Yes   Fall in past 12 months? No   Fall with injury? -   Number of falls in past 12 months -   Fall Risk Score -     Abuse Screening:     Abuse Screening Questionnaire 2018   Do you ever feel afraid of your partner? N   Are you in a relationship with someone who physically or mentally threatens you? N   Is it safe for you to go home?  [facility]livia De La Cruz Coordination of Care Questionaire:   1. Have you been to the ER, urgent care clinic since your last visit? Hospitalized since your last visit? YES [facility]    2. Have you seen or consulted any other health care providers outside of the [address] [place] [address] since your last visit? Include any pap smears or colon screening. YES Dr. Eric Triplett    Advanced Directive:   1. Do you have an Advanced Directive? Yes    2. Would you like information on Advanced Directives?  NO

## 2018-02-16 ENCOUNTER — TELEPHONE (OUTPATIENT)
Dept: FAMILY MEDICINE CLINIC | Age: 68
End: 2018-02-16

## 2018-02-16 ENCOUNTER — TELEPHONE (OUTPATIENT)
Dept: ORTHOPEDIC SURGERY | Age: 68
End: 2018-02-16

## 2018-02-16 NOTE — TELEPHONE ENCOUNTER
Spoke with patient, two patient identifiers verified. Patient informed that she would have to be seen and evaluated for medication. She stated by the time she gets into her appointment it will be right before her SX. As of this message I do not see the patient has been rescheduled for SX. Patient said she would tough it out until the [address]. I informed the patient that if she changes her mind, do not hesitate to call.

## 2018-02-16 NOTE — TELEPHONE ENCOUNTER
Patient states her new medication was not covered by insurance and would liket o have her Novolin filled instead.   Please assist.

## 2018-02-16 NOTE — TELEPHONE ENCOUNTER
Pt states her sx was cxd and needs pain meds if possible.   Pt calling for status update of her refill request.

## 2018-02-16 NOTE — TELEPHONE ENCOUNTER
The patient requests a prescription for Norco to treat back pain. She hopes to receive enough medication until surgery is rescheduled. Please advise.      Last Visit: 01/24/2018 with MD Catrachita Farmer    Next Appointment: none; surgery canceled

## 2018-02-16 NOTE — TELEPHONE ENCOUNTER
Patient SX cancelled due to not being cleared for A1c. Patient A1c is 8.8. Please advise on refill of pain medication. No f/u appointment has been set at this time.

## 2018-02-16 NOTE — TELEPHONE ENCOUNTER
We have not been prescribing pain medication. She would have to be seen and it would be a short term medication to get the patient to surgery.  She can make a FU with a NP next week to be evaluated

## 2018-02-19 ENCOUNTER — TELEPHONE (OUTPATIENT)
Dept: FAMILY MEDICINE CLINIC | Age: 68
End: 2018-02-19

## 2018-02-19 NOTE — TELEPHONE ENCOUNTER
Called [facility]. They can fill the Relion novolin r. Pt aware of new insulin order. She states her BP has run 120/80 all weekend.

## 2018-02-19 NOTE — TELEPHONE ENCOUNTER
Patient is calling stating that the insulin tht was called in for her on Feb 14, 2018 is too expensive and a new script needs to be called in for her. Patient states that the mediation that she uses is called Relon novolin R. This medication is cheaper for the patient. If there are any questions please call the patient.

## 2018-02-20 ENCOUNTER — OFFICE VISIT (OUTPATIENT)
Dept: ORTHOPEDIC SURGERY | Age: 68
End: 2018-02-20

## 2018-02-20 VITALS
OXYGEN SATURATION: 100 % | HEIGHT: 60 IN | HEART RATE: 95 BPM | DIASTOLIC BLOOD PRESSURE: 73 MMHG | RESPIRATION RATE: 15 BRPM | WEIGHT: 183 LBS | TEMPERATURE: 98 F | BODY MASS INDEX: 35.93 KG/M2 | SYSTOLIC BLOOD PRESSURE: 111 MMHG

## 2018-02-20 DIAGNOSIS — E11.49 TYPE 2 DIABETES MELLITUS WITH OTHER NEUROLOGIC COMPLICATION, WITH LONG-TERM CURRENT USE OF INSULIN (HCC): ICD-10-CM

## 2018-02-20 DIAGNOSIS — Z79.891 ENCOUNTER FOR LONG-TERM OPIATE ANALGESIC USE: ICD-10-CM

## 2018-02-20 DIAGNOSIS — Z98.1 S/P LUMBAR FUSION: ICD-10-CM

## 2018-02-20 DIAGNOSIS — G89.29 CHRONIC RADICULAR LUMBAR PAIN: ICD-10-CM

## 2018-02-20 DIAGNOSIS — M54.16 CHRONIC RADICULAR LUMBAR PAIN: ICD-10-CM

## 2018-02-20 DIAGNOSIS — M48.062 SPINAL STENOSIS OF LUMBAR REGION WITH NEUROGENIC CLAUDICATION: ICD-10-CM

## 2018-02-20 DIAGNOSIS — Z79.4 TYPE 2 DIABETES MELLITUS WITH OTHER NEUROLOGIC COMPLICATION, WITH LONG-TERM CURRENT USE OF INSULIN (HCC): ICD-10-CM

## 2018-02-20 DIAGNOSIS — M54.16 LEFT LUMBAR RADICULOPATHY: Primary | ICD-10-CM

## 2018-02-20 PROBLEM — E11.21 TYPE 2 DIABETES WITH NEPHROPATHY (HCC): Status: ACTIVE | Noted: 2018-02-20

## 2018-02-20 RX ORDER — HYDROCODONE BITARTRATE AND ACETAMINOPHEN 5; 325 MG/1; MG/1
1 TABLET ORAL
Qty: 42 TAB | Refills: 0 | Status: SHIPPED | OUTPATIENT
Start: 2018-02-20 | End: 2018-11-16 | Stop reason: ALTCHOICE

## 2018-02-20 NOTE — PROGRESS NOTES
[address]  [address], [address]  [place], [address] [place] [address] Street  Phone: [phone]  Fax: [phone]    Inocencio Parkinson  : 1950  PCP: Alvarez Kent MD    PROGRESS NOTE    HISTORY OF PRESENT ILLNESS:  Chief Complaint   Patient presents with    Follow-up     Alfredo Fallon is a 78 y.o.  female with history of lumbar pain and a fusion. She was last seen with Dr. Eleazar Taveras and was scheduled for a removal of instrumentation and revision of fusion. This was cancelled due to her Blood sugars being elevated. We are waiting on clearance from her PCP on . She will be scheduled soon after. She is here today to be evaluated for pain medication. Denies bladder/bowel dysfunction, saddle paresthesia, weakness, gait disturbance, or other neurological deficit. Pt at this time desires to  continue with current care/proceed with medication evaluation. Opioid Assessment    This is a patient with chronic lumbar pain that contributes to her pain. Pain is described as aching and burning and is worse with walking and affects sleep, standing, play and recreational activities, and her ability to perform ADLs. Pain is better with relaxation, pain medication and lying down. She has tried; PT-Yes,  Non-opioid medications Yes, and spinal injections Yes. Least pain over the last week has been 0/10. Worst pain over the last week has been 10/10. Opioid Risk Tool Reviewed: YES, Score 2  Aberrant behaviors: None. Urine Drug Screen: due - order placed. Controlled substance agreement on file: YES.  reviewed:yes  Pill count is consistent with her prescription: yes and n/a  Concomitant use of a benzodiazepine: no  MME:0, will be 5-10   Naloxone prescription is warranted. It has been provided or is already on file.    Also,  abstinence syndrome was reviewed and discussed with her today N/A    Reports that pain would be a 10/10 w/out medication and that it goes down to a 4/10 after medication. This enables the pt to be functional, achieve ADLs and engage in social activities. Risks and benefits of  opiate therapy have been reviewed with the patient. No pain behaviors. Denies thoughts of harming self or others. Pt has a good risk to benefit ratio which allows the pt to function in a home environment without side effects      This is a chronic problem that is not changed. Per review of available records and patients , there are not sign of overuse, misuse, diversion, or concerning side effects. Today we reviewed: the risk of overdose, addiction, and dependency  The following changes were made to the patients current treatment plan: nothing, medications refilled  medications adjusted, see orders. We discussed that we are providing a short term medication to get to surgery and then we will provide 6 weeks of post op pain medication. Our goal is to wean off by 6 weeks. ASSESSMENT  78 y.o. female with lumbar pain. Diagnoses and all orders for this visit:    1. Left lumbar radiculopathy  -     HYDROcodone-acetaminophen (NORCO) 5-325 mg per tablet; Take 1 Tab by mouth two (2) times daily as needed for Pain. Max Daily Amount: 2 Tabs. Indications: chronic pain    2. Spinal stenosis of lumbar region with neurogenic claudication  -     HYDROcodone-acetaminophen (NORCO) 5-325 mg per tablet; Take 1 Tab by mouth two (2) times daily as needed for Pain. Max Daily Amount: 2 Tabs. Indications: chronic pain    3. Chronic radicular lumbar pain  -     HYDROcodone-acetaminophen (NORCO) 5-325 mg per tablet; Take 1 Tab by mouth two (2) times daily as needed for Pain. Max Daily Amount: 2 Tabs. Indications: chronic pain    4. Type 2 diabetes mellitus with other neurologic complication, with long-term current use of insulin (HCC)  -     HYDROcodone-acetaminophen (NORCO) 5-325 mg per tablet; Take 1 Tab by mouth two (2) times daily as needed for Pain. Max Daily Amount: 2 Tabs.  Indications: chronic pain    5. S/P lumbar fusion  -     HYDROcodone-acetaminophen (NORCO) 5-325 mg per tablet; Take 1 Tab by mouth two (2) times daily as needed for Pain. Max Daily Amount: 2 Tabs. Indications: chronic pain    6. Encounter for long-term opiate analgesic use  -     DRUG SCREEN UR - W/ CONFIRM; Future         IMPRESSION/PLAN    1) Pt was given information on back care. 2) UDS today, Norco ordered today, 5-325 BID PRN  3) Can call for further refills if surgery is not scheduled with in 3 weeks (3 week supply given). She is waiting for clearance from PCP. 4) Ms. Nubia Silva has a reminder for a \"due or due soon\" health maintenance. I have asked that she contact her primary care provider, Gladys Martinez MD, for follow-up on this health maintenance. 5) We have informed patient to notify us for immediate appointment if he has any worsening neurogical symptoms or if an emergency situation presents, then call 911  6) Pt will follow-up for surgery. Risks and benefits of ongoing opiate therapy have been reviewed with the patient.  is appropriate. UDS results have been consistent, last UDS 7/17, UDS today. No pain behaviors. Denies thoughts of harming self or others. Pt has a good risk to benefit ratio which allows the pt to function in a home environment without side effects.           PAST MEDICAL HISTORY  Past Medical History:   Diagnosis Date    Adverse effect of anesthesia      Last 2 surgeries developed CHF    Angina effort ([address]     Anxiety     Arthritis     Breast CA ([address] 1999    Mastectomy and chemo and XRT and also reconstruction    Cancer [facility]) 1996    left breast with 2 repeat lumpectomies 1996, 1997, chemo XRT    Common migraine     Depression     Diabetes mellitus     Gastroparesis     Heart failure ([address]     Hernia of unspecified site of abdominal cavity without mention of obstruction or gangrene     HTN (hypertension)     Hypercholesterolemia     Lumbago     Menopause     Old MI (myocardial infarction) 2005    silent MI    Pancreatitis     Scoliosis     Type II or unspecified type diabetes mellitus without mention of complication, not stated as uncontrolled     Uterine prolapse     Vitamin D deficiency         MEDICATIONS  Current Outpatient Prescriptions   Medication Sig Dispense Refill    HYDROcodone-acetaminophen (NORCO) 5-325 mg per tablet Take 1 Tab by mouth two (2) times daily as needed for Pain. Max Daily Amount: 2 Tabs. Indications: chronic pain 42 Tab 0    diazePAM (VALIUM) 5 mg tablet Take 5 mg by mouth every six (6) hours as needed for Anxiety.  carvedilol (COREG) 6.25 mg tablet TAKE ONE TABLET BY MOUTH TWICE DAILY WITH MEALS 180 Tab 0    insulin regular (NOVOLIN R, HUMULIN R) 100 unit/mL injection 20 Units by SubCUTAneous route three (3) times daily. 5 Vial 0    ferrous sulfate (IRON) 325 mg (65 mg iron) tablet Take 325 mg by mouth Daily (before breakfast). Indications: Iron Deficiency Anemia      gabapentin (NEURONTIN) 100 mg capsule Take 200 mg by mouth nightly.  lisinopril (PRINIVIL, ZESTRIL) 10 mg tablet TAKE ONE TABLET BY MOUTH ONCE DAILY 90 Tab 0    glipiZIDE (GLUCOTROL) 10 mg tablet TAKE ONE TABLET BY MOUTH TWICE DAILY 180 Tab 0    alcohol swabs padm Test bs daily. E11.65 300 Pad 3    Blood Glucose Control High&Low (ACCU-CHEK KEVIN CONTROL SOLN) soln Use as directed. E11.65 3 mL 0    Blood-Glucose Meter (ACCU-CHEK KEVIN PLUS METER) misc Check blood glucose twice daily and as needed. E11.49 1 Each 0    glucose blood VI test strips (ACCU-CHEK KEVIN PLUS TEST STRP) strip Check blood glucose twice daily and as needed. E11.49 200 Strip 3    Lancets (ACCU-CHEK SOFTCLIX LANCETS) misc Check blood glucose twice daily and as needed. E11.49 200 Each 3    metFORMIN (GLUCOPHAGE) 1,000 mg tablet TAKE ONE TABLET BY MOUTH TWICE DAILY WITH FOOD 180 Tab 0    alendronate (FOSAMAX) 70 mg tablet Take 1 Tab by mouth every seven (7) days.  30 Tab 1    pravastatin (PRAVACHOL) 40 mg tablet Take 40 mg by mouth daily.  clotrimazole-betamethasone (LOTRISONE) topical cream Apply bid to affected area (pea-sized amount) 15 g 0    clopidogrel (PLAVIX) 75 mg tab Take 1 Tab by mouth daily. (Patient taking differently: Take 150 mg by mouth two (2) times a day.) 90 Tab 3    ipratropium (ATROVENT) 0.02 % nebulizer solution 2.5 mL by Nebulization route every four (4) hours (while awake). Indications: PREVENTION OF BRONCHOSPASM WITH CHRONIC BRONCHITIS 60 mL 1    albuterol (PROVENTIL HFA, VENTOLIN HFA, PROAIR HFA) 90 mcg/actuation inhaler Take 2 Puffs by inhalation every four (4) hours as needed for Wheezing. Indications: Chronic Obstructive Pulmonary Disease 1 Inhaler 1    insulin syringe-needle U-100 Dispense ultrafine 0.5 mL syringes with 31 gauge needle 100 Syringe 11    naproxen sodium (ALEVE) 220 mg cap Take  by mouth.  DULoxetine (CYMBALTA) 30 mg capsule Take 1 Cap by mouth daily. Take 1 Tab QHS x1 week, then increase to 2Tab QHS 60 Cap 2    dicyclomine (BENTYL) 20 mg tablet       atorvastatin (LIPITOR) 40 mg tablet Take 1 Tab by mouth daily. 90 Tab 0    cyanocobalamin (VITAMIN B12) 2,500 mcg sublingual tablet Take 1 Tab by mouth daily. 100 Tab 3    ergocalciferol (ERGOCALCIFEROL) 50,000 unit capsule Take 1 Cap by mouth every Sunday. 4 Cap 3       ALLERGIES  Allergies   Allergen Reactions    Percocet [Oxycodone-Acetaminophen] Rash and Itching     Can Take Percocet but must take Benadryl for itching     Perfume Ht52 Rash     Perfume specific:  MUSK    Pravastatin Itching     Not sure       SOCIAL HISTORY    Social History     Social History    Marital status:      Spouse name: N/A    Number of children: N/A    Years of education: N/A     Occupational History    Not on file.      Social History Main Topics    Smoking status: Current Some Day Smoker     Packs/day: 0.50     Years: 35.00     Types: Cigarettes     Last attempt to quit: 2/20/2017    Smokeless tobacco: Never Used    Alcohol use No    Drug use: No    Sexual activity: Yes     Partners: Male     Birth control/ protection: Surgical     Other Topics Concern    Not on file     Social History Narrative       SUBJECTIVE    Pain Scale: 5/10    Pain Assessment  1/24/2018   Location of Pain Back;Finger   Pain Location Comment -   Location Modifiers Left;Right   Severity of Pain -   Quality of Pain Dull;Aching;Burning   Quality of Pain Comment tingling   Duration of Pain -   Frequency of Pain -   Aggravating Factors Walking;Standing   Aggravating Factors Comment -   Limiting Behavior -   Relieving Factors Ice;Rest   Relieving Factors Comment pain med   Result of Injury -   Type of Injury -       Accompanied by self. REVIEW OF SYSTEMS  ROS    Constitutional: Negative for fever, chills, or weight change. Respiratory: Negative for cough or shortness of breath. Cardiovascular: Negative for chest pain or palpitations. Gastrointestinal: Negative for acid reflux, change in bowel habits, or constipation. Genitourinary: Negative for incontinence, dysuria and flank pain. Musculoskeletal: Positive for lumbar pain. Skin: Negative for rash. Neurological: Negative for headaches, dizziness, or numbness. Endo/Heme/Allergies: Negative . Psychiatric/Behavioral: Negative. PHYSICAL EXAMINATION  Visit Vitals    /73    Pulse 95    Temp 98 °F (36.7 °C) (Oral)    Resp 15    Ht 5' (1.524 m)    Wt 183 lb (83 kg)    SpO2 100%    BMI 35.74 kg/m2       Constitutional: Well developed,  well nourished,  awake, alert, and in no acute distress. Neurological:  Sensation to light touch is intact. Psychiatric: Affect and mood are appropriate. Integumentary: No rashes or abrasions noted on exposed areas,  warm, dry and intact. Cardiovascular/Peripheral Vascular:  No peripheral edema is noted. Lymphatic:  No evidence of lymphedema. No cervical lymphadenopathy.      SPINE/MUSCULOSKELETAL EXAM      Lumbar spine:  No rash, ecchymosis, or gross obliquity. No fasciculations. No focal atrophy is noted. Range of motion is intact. No Tenderness to palpation. SI joints non-tender. Trochanters non tender. Musculoskeletal:  No pain with extension, axial loading, or forward flexion. No pain with internal or external rotation of her hips. MOTOR     Hip Flex  Quads Hamstrings Ankle DF EHL Ankle PF   Right +4/5 +4/5 +4/5 +4/5 +4/5 +4/5   Left +4/5 +4/5 +4/5 +4/5 +4/5 +4/5     Straight Leg raise negative bilaterally. normal gait and station    Ambulation with quad cane. full weight bearing, non-antalgic gait.     Beto Luu NP

## 2018-02-20 NOTE — PATIENT INSTRUCTIONS
Learning About How to Have a Healthy Back  What causes back pain? Back pain is often caused by overuse, strain, or injury. For example, people often hurt their backs playing sports or working in the yard, being jolted in a car accident, or lifting something too heavy. Aging plays a part too. Your bones and muscles tend to lose strength as you age, which makes injury more likely. The spongy discs between the bones of the spine (vertebrae) may suffer from wear and tear and no longer provide enough cushion between the bones. A disc that bulges or breaks open (herniated disc) can press on nerves, causing back pain. In some people, back pain is the result of arthritis, broken vertebrae caused by bone loss (osteoporosis), illness, or a spine problem. Although most people have back pain at one time or another, there are steps you can take to make it less likely. How can you have a healthy back? Reduce stress on your back through good posture  Slumping or slouching alone may not cause low back pain. But after the back has been strained or injured, bad posture can make pain worse. · Sleep in a position that maintains your back's normal curves and on a mattress that feels comfortable. Sleep on your side with a pillow between your knees, or sleep on your back with a pillow under your knees. These positions can reduce strain on your back. · Stand and sit up straight. \"Good posture\" generally means your ears, shoulders, and hips are in a straight line. · If you must stand for a long time, put one foot on a stool, ledge, or box. Switch feet every now and then. · Sit in a chair that is low enough to let you place both feet flat on the floor with both knees nearly level with your hips. If your chair or desk is too high, use a footrest to raise your knees. Place a small pillow, a rolled-up towel, or a lumbar roll in the curve of your back if you need extra support.   · Try a kneeling chair, which helps tilt your hips forward. This takes pressure off your lower back. · Try sitting on an exercise ball. It can rock from side to side, which helps keep your back loose. · When driving, keep your knees nearly level with your hips. Sit straight, and drive with both hands on the steering wheel. Your arms should be in a slightly bent position. Reduce stress on your back through careful lifting  · Squat down, bending at the hips and knees only. If you need to, put one knee to the floor and extend your other knee in front of you, bent at a right angle (half kneeling). · Press your chest straight forward. This helps keep your upper back straight while keeping a slight arch in your low back. · Hold the load as close to your body as possible, at the level of your belly button (navel). · Use your feet to change direction, taking small steps. · Lead with your hips as you change direction. Keep your shoulders in line with your hips as you move. · Set down your load carefully, squatting with your knees and hips only. Exercise and stretch your back  · Do some exercise on most days of the week, if your doctor says it is okay. You can walk, run, swim, or cycle. · Stretch your back muscles. Here are a few exercises to try:  Basim Roys on your back, and gently pull one bent knee to your chest. Put that foot back on the floor, and then pull the other knee to your chest.  ¨ Do pelvic tilts. Lie on your back with your knees bent. Tighten your stomach muscles. Pull your belly button (navel) in and up toward your ribs. You should feel like your back is pressing to the floor and your hips and pelvis are slightly lifting off the floor. Hold for 6 seconds while breathing smoothly. ¨ Sit with your back flat against a wall. · Keep your core muscles strong. The muscles of your back, belly (abdomen), and buttocks support your spine. ¨ Pull in your belly and imagine pulling your navel toward your spine. Hold this for 6 seconds, then relax.  Remember to keep breathing normally as you tense your muscles. ¨ Do curl-ups. Always do them with your knees bent. Keep your low back on the floor, and curl your shoulders toward your knees using a smooth, slow motion. Keep your arms folded across your chest. If this bothers your neck, try putting your hands behind your neck (not your head), with your elbows spread apart. ¨ Lie on your back with your knees bent and your feet flat on the floor. Tighten your belly muscles, and then push with your feet and raise your buttocks up a few inches. Hold this position 6 seconds as you continue to breathe normally, then lower yourself slowly to the floor. Repeat 8 to 12 times. ¨ If you like group exercise, try Pilates or yoga. These classes have poses that strengthen the core muscles. Lead a healthy lifestyle  · Stay at a healthy weight to avoid strain on your back. · Do not smoke. Smoking increases the risk of osteoporosis, which weakens the spine. If you need help quitting, talk to your doctor about stop-smoking programs and medicines. These can increase your chances of quitting for good. Where can you learn more? Go to http://flores-kendell.info/. Enter L315 in the search box to learn more about \"Learning About How to Have a Healthy Back. \"  Current as of: March 21, 2017  Content Version: 11.4  © 1557-9947 [employer]. Care instructions adapted under license by [employer] (which disclaims liability or warranty for this information). If you have questions about a medical condition or this instruction, always ask your healthcare professional. [place] [address] any warranty or liability for your use of this information.

## 2018-02-28 ENCOUNTER — TELEPHONE (OUTPATIENT)
Dept: FAMILY MEDICINE CLINIC | Age: 68
End: 2018-02-28

## 2018-02-28 ENCOUNTER — OFFICE VISIT (OUTPATIENT)
Dept: FAMILY MEDICINE CLINIC | Age: 68
End: 2018-02-28

## 2018-02-28 ENCOUNTER — HOSPITAL ENCOUNTER (OUTPATIENT)
Dept: LAB | Age: 68
Discharge: HOME | End: 2018-02-28

## 2018-02-28 VITALS
HEIGHT: 60 IN | BODY MASS INDEX: 36.12 KG/M2 | OXYGEN SATURATION: 96 % | RESPIRATION RATE: 20 BRPM | SYSTOLIC BLOOD PRESSURE: 110 MMHG | WEIGHT: 184 LBS | DIASTOLIC BLOOD PRESSURE: 70 MMHG | HEART RATE: 80 BPM | TEMPERATURE: 98.5 F

## 2018-02-28 DIAGNOSIS — I10 ESSENTIAL HYPERTENSION: ICD-10-CM

## 2018-02-28 PROCEDURE — 99001 SPECIMEN HANDLING PT-LAB: CPT | Performed by: INTERNAL MEDICINE

## 2018-02-28 NOTE — PROGRESS NOTES
Assessment/Plan:    1. Uncontrolled type 2 diabetes mellitus with diabetic nephropathy, with long-term current use of insulin (HCC)  -cont current regimen    2. Essential hypertension  -much improved. Cont current regimen. The plan was discussed with the patient. The patient verbalized understanding and is in agreement with the plan. All medication potential side effects were discussed with the patient. Health Maintenance:   Health Maintenance   Topic Date Due    EYE EXAM RETINAL OR DILATED Q1  02/07/2018    LIPID PANEL Q1  02/19/2018    MICROALBUMIN Q1  03/28/2018    MEDICARE YEARLY EXAM  07/18/2018    HEMOGLOBIN A1C Q6M  08/14/2018    GLAUCOMA SCREENING Q2Y  02/07/2019    FOOT EXAM Q1  02/14/2019    BREAST CANCER SCRN MAMMOGRAM  08/11/2019    COLONOSCOPY  10/06/2019    DTaP/Tdap/Td series (2 - Td) 07/26/2026    Hepatitis C Screening  Completed    OSTEOPOROSIS SCREENING (DEXA)  Completed    ZOSTER VACCINE AGE 60>  Addressed    Pneumococcal 65+ Low/Medium Risk  Completed    Influenza Age 5 to Adult  Addressed       Dayton Restrepo is a 78 y.o. female and presents with Diabetes     Subjective:  HTN - bp better controlled today. We had delayed her surgery for this reason. DM 2- A1c 8.8.  bs log shows bs of 120-167 fasting. Low of 50s on a few occasions (after eating light dinner). Still having bs into 200s usually after lunch. ROS:  Constitutional: No recent weight change. No weakness/fatigue. No f/c. Cardiovascular: No CP/palpitations. No NINA/orthopnea/PND. Respiratory: No cough/sputum, dyspnea, wheezing. Gastointestinal: No dysphagia, reflux. No n/v. No constipation/diarrhea. No melena/rectal bleeding. Genitourinary: No dysuria, urinary hesitancy, nocturia, hematuria. No incontinence. The problem list was updated as a part of today's visit.   Patient Active Problem List   Diagnosis Code    Vitamin D deficiency E55.9    Hx of breast cancer Z85.3    Chronic pain syndrome G89.4    Osteoarthrosis, unspecified whether generalized or localized, unspecified site W37.22    Diastolic congestive heart failure ([facility]) I50.30    COPD (chronic obstructive pulmonary disease) ([facility]) J44.9    GERD (gastroesophageal reflux disease) K21.9    Tobacco dependence F17.200    Chronic radicular lumbar pain M54.16, G89.29    Scoliosis M41.9    Essential hypertension I10    Pure hypercholesterolemia E78.00    CAD (coronary artery disease) I25.10    Spinal stenosis of lumbar region with neurogenic claudication M48.062    S/P lumbar fusion Z98.1    Osteoporosis M81.0    Hemianopsia H53.47    Reactive depression F32.9    Stenosis of left carotid artery I65.22    Carpal tunnel syndrome of right wrist G56.01    Vomiting R11.10    Pain of upper abdomen R10.10    Sacral pain M53.3    Type 2 diabetes mellitus with neurologic complication, with long-term current use of insulin ([facility]) E11.49, Z79.4    History of compression fracture of spine Z87.81    History of stroke Z86.73    Left lumbar radiculopathy M54.16    Sacroiliac joint pain M53.3    Stage 3 chronic kidney disease N18.3    Type 2 diabetes with nephropathy ([facility]) E11.21       The PSH, FH were reviewed. SH:  Social History   Substance Use Topics    Smoking status: Current Some Day Smoker     Packs/day: 0.50     Years: 35.00     Types: Cigarettes     Last attempt to quit: 2/20/2017    Smokeless tobacco: Never Used    Alcohol use No       Medications/Allergies:  Current Outpatient Prescriptions on File Prior to Visit   Medication Sig Dispense Refill    HYDROcodone-acetaminophen (NORCO) 5-325 mg per tablet Take 1 Tab by mouth two (2) times daily as needed for Pain. Max Daily Amount: 2 Tabs. Indications: chronic pain 42 Tab 0    diazePAM (VALIUM) 5 mg tablet Take 5 mg by mouth every six (6) hours as needed for Anxiety.       carvedilol (COREG) 6.25 mg tablet TAKE ONE TABLET BY MOUTH TWICE DAILY WITH MEALS 180 Tab 0  insulin regular (NOVOLIN R, HUMULIN R) 100 unit/mL injection 20 Units by SubCUTAneous route three (3) times daily. 5 Vial 0    ferrous sulfate (IRON) 325 mg (65 mg iron) tablet Take 325 mg by mouth Daily (before breakfast). Indications: Iron Deficiency Anemia      gabapentin (NEURONTIN) 100 mg capsule Take 200 mg by mouth nightly.  naproxen sodium (ALEVE) 220 mg cap Take  by mouth.  lisinopril (PRINIVIL, ZESTRIL) 10 mg tablet TAKE ONE TABLET BY MOUTH ONCE DAILY 90 Tab 0    glipiZIDE (GLUCOTROL) 10 mg tablet TAKE ONE TABLET BY MOUTH TWICE DAILY 180 Tab 0    alcohol swabs padm Test bs daily. E11.65 300 Pad 3    Blood Glucose Control High&Low (ACCU-CHEK KEVIN CONTROL SOLN) soln Use as directed. E11.65 3 mL 0    Blood-Glucose Meter (ACCU-CHEK KEVIN PLUS METER) misc Check blood glucose twice daily and as needed. E11.49 1 Each 0    glucose blood VI test strips (ACCU-CHEK KEVIN PLUS TEST STRP) strip Check blood glucose twice daily and as needed. E11.49 200 Strip 3    Lancets (ACCU-CHEK SOFTCLIX LANCETS) misc Check blood glucose twice daily and as needed. E11.49 200 Each 3    metFORMIN (GLUCOPHAGE) 1,000 mg tablet TAKE ONE TABLET BY MOUTH TWICE DAILY WITH FOOD 180 Tab 0    alendronate (FOSAMAX) 70 mg tablet Take 1 Tab by mouth every seven (7) days. 30 Tab 1    pravastatin (PRAVACHOL) 40 mg tablet Take 40 mg by mouth daily.  atorvastatin (LIPITOR) 40 mg tablet Take 1 Tab by mouth daily. 90 Tab 0    clotrimazole-betamethasone (LOTRISONE) topical cream Apply bid to affected area (pea-sized amount) 15 g 0    clopidogrel (PLAVIX) 75 mg tab Take 1 Tab by mouth daily. (Patient taking differently: Take 150 mg by mouth two (2) times a day.) 90 Tab 3    ipratropium (ATROVENT) 0.02 % nebulizer solution 2.5 mL by Nebulization route every four (4) hours (while awake).  Indications: PREVENTION OF BRONCHOSPASM WITH CHRONIC BRONCHITIS 60 mL 1    albuterol (PROVENTIL HFA, VENTOLIN HFA, PROAIR HFA) 90 mcg/actuation inhaler Take 2 Puffs by inhalation every four (4) hours as needed for Wheezing. Indications: Chronic Obstructive Pulmonary Disease 1 Inhaler 1    insulin syringe-needle U-100 Dispense ultrafine 0.5 mL syringes with 31 gauge needle 100 Syringe 11    DULoxetine (CYMBALTA) 30 mg capsule Take 1 Cap by mouth daily. Take 1 Tab QHS x1 week, then increase to 2Tab QHS 60 Cap 2    dicyclomine (BENTYL) 20 mg tablet       cyanocobalamin (VITAMIN B12) 2,500 mcg sublingual tablet Take 1 Tab by mouth daily. 100 Tab 3    ergocalciferol (ERGOCALCIFEROL) 50,000 unit capsule Take 1 Cap by mouth every Sunday. 4 Cap 3     No current facility-administered medications on file prior to visit. Allergies   Allergen Reactions    Percocet [Oxycodone-Acetaminophen] Rash and Itching     Can Take Percocet but must take Benadryl for itching     Perfume Ht52 Rash     Perfume specific:  MUSK    Pravastatin Itching     Not sure       Objective:  Visit Vitals    /70 (BP 1 Location: Right arm, BP Patient Position: Sitting)    Pulse 80    Temp 98.5 °F (36.9 °C) (Oral)    Resp 20    Ht 5' (1.524 m)    Wt 184 lb (83.5 kg)    SpO2 96%    BMI 35.94 kg/m2      Constitutional: Well developed, nourished, no distress, alert, obese habitus   CV: S1, S2.  RRR. No murmurs/rubs. No thrills palpated. No carotid bruits. Intact distal pulses. No edema. Pulm: No abnormalities on inspection. Clear to auscultation bilaterally. No wheezing/rhonchi. Normal effort. GI: Soft, nontender, nondistended. Normal active bowel sounds.       Labwork and Ancillary Studies:    CBC w/Diff  Lab Results   Component Value Date/Time    WBC 6.5 02/09/2018 09:05 AM    HGB 11.2 (L) 02/09/2018 09:05 AM    PLATELET 742 86/77/0870 09:05 AM         Basic Metabolic Profile/LFTs  Lab Results   Component Value Date/Time    Sodium 134 (L) 02/09/2018 09:05 AM    Potassium 4.7 02/09/2018 09:05 AM    Chloride 98 (L) 02/09/2018 09:05 AM    CO2 25 02/09/2018 09:05 AM    Anion gap 11 02/09/2018 09:05 AM    Glucose 475 (HH) 02/09/2018 09:05 AM    BUN 29 (H) 02/09/2018 09:05 AM    Creatinine 1.05 02/09/2018 09:05 AM    BUN/Creatinine ratio 28 (H) 02/09/2018 09:05 AM    GFR est AA >60 02/09/2018 09:05 AM    GFR est non-AA 52 (L) 02/09/2018 09:05 AM    Calcium 9.2 02/09/2018 09:05 AM      Lab Results   Component Value Date/Time    ALT (SGPT) 25 02/09/2018 09:05 AM    AST (SGOT) 13 (L) 02/09/2018 09:05 AM    Alk.  phosphatase 111 02/09/2018 09:05 AM    Bilirubin, direct <0.1 04/28/2016 03:42 AM    Bilirubin, total 0.4 02/09/2018 09:05 AM

## 2018-02-28 NOTE — PROGRESS NOTES
Jocelin Callahan is a 78 y.o. female (: 1950) presenting to address:    Chief Complaint   Patient presents with    Diabetes       Vitals:    18 0957   BP: 110/70   Pulse: 80   Resp: 20   Temp: 98.5 °F (36.9 °C)   TempSrc: Oral   SpO2: 96%   Weight: 184 lb (83.5 kg)   Height: 5' (1.524 m)   PainSc:   4   PainLoc: Back       Learning Assessment:     Learning Assessment 10/22/2015   PRIMARY LEARNER Patient   HIGHEST LEVEL OF EDUCATION - PRIMARY LEARNER  -   BARRIERS PRIMARY LEARNER -   CO-LEARNER CAREGIVER -   PRIMARY LANGUAGE ENGLISH   LEARNER PREFERENCE PRIMARY READING   ANSWERED BY pt   RELATIONSHIP SELF     Depression Screening:     PHQ over the last two weeks 2017   Little interest or pleasure in doing things Not at all   Feeling down, depressed or hopeless Not at all   Total Score PHQ 2 0   Trouble falling or staying asleep, or sleeping too much -   Feeling tired or having little energy -   Poor appetite or overeating -   Feeling bad about yourself - or that you are a failure or have let yourself or your family down -   Trouble concentrating on things such as school, work, reading or watching TV -   Moving or speaking so slowly that other people could have noticed; or the opposite being so fidgety that others notice -   Thoughts of being better off dead, or hurting yourself in some way -   PHQ 9 Score -   How difficult have these problems made it for you to do your work, take care of your home and get along with others -     Fall Risk Assessment:     Fall Risk Assessment, last 12 mths 2018   Able to walk? Yes   Fall in past 12 months? No   Fall with injury? -   Number of falls in past 12 months -   Fall Risk Score -     Abuse Screening:     Abuse Screening Questionnaire 2018   Do you ever feel afraid of your partner? N   Are you in a relationship with someone who physically or mentally threatens you? N   Is it safe for you to go home? Y     Coordination of Care Questionaire:   1. Have you been to the ER, urgent care clinic since your last visit? Hospitalized since your last visit? NO    2. Have you seen or consulted any other health care providers outside of the [address] [place] [address] since your last visit? Include any pap smears or colon screening. YES Cardiology      Advanced Directive:   1. Do you have an Advanced Directive? YES    2. Would you like information on Advanced Directives?  NO

## 2018-02-28 NOTE — MR AVS SNAPSHOT
Blade Mcintosh 
 
 
 [address] [facility] [address] [phone] Patient: Maikol Vital MRN: [id number] MPM:[birth year] Visit Information Date & Time Provider Department Dept. Phone Encounter #  
 2/28/2018 10:00 AM Ana Cristina Adan [phone] [id number] Follow-up Instructions Return in about 3 months (around 5/28/2018). Upcoming Health Maintenance Date Due  
 EYE EXAM RETINAL OR DILATED Q1 2/7/2018 LIPID PANEL Q1 2/19/2018 MICROALBUMIN Q1 3/28/2018 MEDICARE YEARLY EXAM 7/18/2018 HEMOGLOBIN A1C Q6M 8/14/2018 GLAUCOMA SCREENING Q2Y 2/7/2019 FOOT EXAM Q1 2/14/2019 BREAST CANCER SCRN MAMMOGRAM 8/11/2019 COLONOSCOPY 10/6/2019 DTaP/Tdap/Td series (2 - Td) 7/26/2026 Allergies as of 2/28/2018  Review Complete On: 2/28/2018 By: Julieta Rodriguez Severity Noted Reaction Type Reactions Percocet [Oxycodone-acetaminophen]  10/08/2010    Rash, Itching Can Take Percocet but must take Benadryl for itching Perfume Ht52  02/05/2015    Rash Perfume specific:  MUSK Pravastatin  09/26/2017    Itching Not sure Current Immunizations  Reviewed on 4/18/2016 Name Date Influenza High Dose Vaccine PF 8/21/2017 11:43 AM, 10/19/2016  9:33 AM  
 Influenza Vaccine 10/19/2015  8:47 AM, 10/17/2013 Influenza Vaccine Whole 12/1/2008 Pneumococcal Polysaccharide (PPSV-23) 4/18/2016  1:50 PM  
 ZZZ-RETIRED (DO NOT USE) Pneumococcal Vaccine (Unspecified Type) 12/1/2008 Not reviewed this visit You Were Diagnosed With   
  
 Codes Comments Uncontrolled type 2 diabetes mellitus with diabetic nephropathy, with long-term current use of insulin ([facility]    -  Primary ICD-10-CM: E11.21, E11.65, Z79.4 ICD-9-CM: 250.42, 583.81, V58.67 Essential hypertension     ICD-10-CM: I10 
ICD-9-CM: 401.9 Vitals BP Pulse Temp Resp Height(growth percentile) Weight(growth percentile) 110/70 (BP 1 Location: Right arm, BP Patient Position: Sitting) 80 98.5 °F (36.9 °C) (Oral) 20 5' (1.524 m) 184 lb (83.5 kg) SpO2 BMI OB Status Smoking Status 96% 35.94 kg/m2 Hysterectomy Current Some Day Smoker Vitals History BMI and BSA Data Body Mass Index Body Surface Area 35.94 kg/m 2 1.88 m 2 Preferred Pharmacy Pharmacy Name Phone [address] [place] [address] [phone] Your Updated Medication List  
  
   
This list is accurate as of 2/28/18 10:27 AM.  Always use your most recent med list.  
  
  
  
  
 albuterol 90 mcg/actuation inhaler Commonly known as:  PROVENTIL HFA, VENTOLIN HFA, PROAIR HFA Take 2 Puffs by inhalation every four (4) hours as needed for Wheezing. Indications: Chronic Obstructive Pulmonary Disease  
  
 alcohol swabs Padm Test bs daily. E11.65  
  
 alendronate 70 mg tablet Commonly known as:  FOSAMAX Take 1 Tab by mouth every seven (7) days. ALEVE 220 mg Cap Generic drug:  naproxen sodium Take  by mouth. Blood Glucose Control High&Low Soln Commonly known as:  ACCU-CHEK KEVIN CONTROL SOLN  
Use as directed. E11.65 Blood-Glucose Meter Misc Commonly known as:  ACCU-CHEK KEVIN PLUS METER Check blood glucose twice daily and as needed. E11.49  
  
 carvedilol 6.25 mg tablet Commonly known as:  COREG  
TAKE ONE TABLET BY MOUTH TWICE DAILY WITH MEALS  
  
 clopidogrel 75 mg Tab Commonly known as:  PLAVIX Take 1 Tab by mouth daily. clotrimazole-betamethasone topical cream  
Commonly known as:  Al Orellana Apply bid to affected area (pea-sized amount) diazePAM 5 mg tablet Commonly known as:  VALIUM Take 5 mg by mouth every six (6) hours as needed for Anxiety. gabapentin 100 mg capsule Commonly known as:  NEURONTIN Take 200 mg by mouth nightly. glipiZIDE 10 mg tablet Commonly known as:  GLUCOTROL  
TAKE ONE TABLET BY MOUTH TWICE DAILY  
  
 glucose blood VI test strips strip Commonly known as:  ACCU-CHEK KEVIN PLUS TEST STRP Check blood glucose twice daily and as needed. E11.49 HYDROcodone-acetaminophen 5-325 mg per tablet Commonly known as:  Min Daring Take 1 Tab by mouth two (2) times daily as needed for Pain. Max Daily Amount: 2 Tabs. Indications: chronic pain  
  
 insulin regular 100 unit/mL injection Commonly known as:  NOVOLIN R, HUMULIN R  
20 Units by SubCUTAneous route three (3) times daily. insulin syringe-needle U-100 Dispense ultrafine 0.5 mL syringes with 31 gauge needle  
  
 ipratropium 0.02 % Soln Commonly known as:  ATROVENT  
2.5 mL by Nebulization route every four (4) hours (while awake). Indications: PREVENTION OF BRONCHOSPASM WITH CHRONIC BRONCHITIS Iron 325 mg (65 mg iron) tablet Generic drug:  ferrous sulfate Take 325 mg by mouth Daily (before breakfast). Indications: Iron Deficiency Anemia Lancets Misc Commonly known as:  ACCU-CHEK SOFTCLIX LANCETS Check blood glucose twice daily and as needed. E11.49  
  
 lisinopril 10 mg tablet Commonly known as:  PRINIVIL, ZESTRIL  
TAKE ONE TABLET BY MOUTH ONCE DAILY  
  
 metFORMIN 1,000 mg tablet Commonly known as:  GLUCOPHAGE  
TAKE ONE TABLET BY MOUTH TWICE DAILY WITH FOOD  
  
 pravastatin 40 mg tablet Commonly known as:  PRAVACHOL Take 40 mg by mouth daily. Follow-up Instructions Return in about 3 months (around 5/28/2018). Introducing \[facility]\"" & HEALTH SERVICES! Dear Jayla Medina: 
Thank you for requesting a [facility] account. Our records indicate that you have previously registered for a [employer] account but its currently inactive. Please call our [employer] support line at [phone]. Additional Information If you have questions, please visit the Frequently Asked Questions section of the [employer] website at [email or web address] Remember, [employer] is NOT to be used for urgent needs. For medical emergencies, dial 911. Now available from your iPhone and Android! Please provide this summary of care documentation to your next provider. Your primary care clinician is listed as Norma Emanuel. If you have any questions after today's visit, please call [phone].

## 2018-02-28 NOTE — PROGRESS NOTES
Addendum 2/28/18:  Assessment/Plan:    The patient is high risk for planned procedure given her significant co-morbidities and poor functional status, but may proceed to OR without further testing. Her BP is now optimized. I have discussed that uncontrolled diabetes increases her risk for post-op complications, to include infection and poor wound healing.     The patient has a risk for pulmonary failure. Aggressive pulmonary toilet post-operatively is recommended. The patient was counseled on smoking cessation.     The following recommendations were made for medication regimen prior to surgery:  Stop plavix one week prior to surgery. I have discussed this increases her risk of stroke. Continue taking HTN meds prior to surgery. Hold short-acting insulin on morning of surgery. Hold metformin and other oral hypoglycemic agents on the day of surgery. Hold NSAIDs 7 days prior to surgery.     I will continue to follow along with the patient's treatment and care.   For any questions please do not hesitate to call the office at [phone].

## 2018-02-28 NOTE — TELEPHONE ENCOUNTER
[facility] called on behalf of patient. Stating that they had to cancel her appoint due to not seeing any notes for pre-op clearance. Jensen Zuñiga from [facility] asked if it can be added to the notes that the pt is clear for having surgery.

## 2018-03-01 PROBLEM — E11.21 DIABETIC NEPHROPATHY (HCC): Status: ACTIVE | Noted: 2018-03-01

## 2018-03-01 PROBLEM — E11.21 DIABETIC NEPHROPATHY (HCC): Status: RESOLVED | Noted: 2018-03-01 | Resolved: 2018-03-01

## 2018-03-01 LAB
ALBUMIN/CREAT UR: 45.3 MG/G CREAT (ref 0–30)
CHOLEST SERPL-MCNC: 169 MG/DL (ref 100–199)
CREAT UR-MCNC: 51.6 MG/DL
HDLC SERPL-MCNC: 42 MG/DL
INTERPRETATION, 910389: NORMAL
LDLC SERPL CALC-MCNC: 86 MG/DL (ref 0–99)
MICROALBUMIN UR-MCNC: 23.4 UG/ML
TRIGL SERPL-MCNC: 207 MG/DL (ref 0–149)
VLDLC SERPL CALC-MCNC: 41 MG/DL (ref 5–40)

## 2018-03-01 NOTE — TELEPHONE ENCOUNTER
Spoke to surgery center, refaxed note with addendum. They may obtain cardio clearance due to high risk.

## 2018-03-05 ENCOUNTER — OFFICE VISIT (OUTPATIENT)
Dept: CARDIOLOGY CLINIC | Age: 68
End: 2018-03-05

## 2018-03-05 VITALS
DIASTOLIC BLOOD PRESSURE: 92 MMHG | WEIGHT: 183 LBS | BODY MASS INDEX: 35.93 KG/M2 | HEART RATE: 98 BPM | OXYGEN SATURATION: 97 % | HEIGHT: 60 IN | SYSTOLIC BLOOD PRESSURE: 151 MMHG

## 2018-03-05 DIAGNOSIS — I10 ESSENTIAL HYPERTENSION: Primary | ICD-10-CM

## 2018-03-05 NOTE — MR AVS SNAPSHOT
[address] [facility] [address] [facility] [address] [id number] 
[phone] Patient: Laurice Aase MRN: [id number] YCO:[birth year] Visit Information Date & Time Provider Department Dept. Phone Encounter #  
 3/5/2018  1:15 PM Corry Benitez  [facility] Specialist at [facility] [phone] [id number] Follow-up Instructions Return in about 1 year (around 3/5/2019). Your Appointments 5/30/2018 11:30 AM  
Office Visit with Julia Young MD  
[facility] [address] [facility]) Appt Note: 3 month f/u  kmb [address] [facility] [id number] [phone]  
  
   
 [address] Upcoming Health Maintenance Date Due  
 EYE EXAM RETINAL OR DILATED Q1 2/7/2018 MEDICARE YEARLY EXAM 7/18/2018 HEMOGLOBIN A1C Q6M 8/14/2018 GLAUCOMA SCREENING Q2Y 2/7/2019 FOOT EXAM Q1 2/14/2019 MICROALBUMIN Q1 2/28/2019 LIPID PANEL Q1 2/28/2019 BREAST CANCER SCRN MAMMOGRAM 8/11/2019 COLONOSCOPY 10/6/2019 DTaP/Tdap/Td series (2 - Td) 7/26/2026 Allergies as of 3/5/2018  Review Complete On: 3/5/2018 By: Edu Oakes RN Severity Noted Reaction Type Reactions Percocet [Oxycodone-acetaminophen]  10/08/2010    Rash, Itching Can Take Percocet but must take Benadryl for itching Perfume Ht52  02/05/2015    Rash Perfume specific:  MUSK Pravastatin  09/26/2017    Itching Not sure Current Immunizations  Reviewed on 4/18/2016 Name Date Influenza High Dose Vaccine PF 8/21/2017 11:43 AM, 10/19/2016  9:33 AM  
 Influenza Vaccine 10/19/2015  8:47 AM, 10/17/2013 Influenza Vaccine Whole 12/1/2008 Pneumococcal Polysaccharide (PPSV-23) 4/18/2016  1:50 PM  
 ZZZ-RETIRED (DO NOT USE) Pneumococcal Vaccine (Unspecified Type) 12/1/2008 Not reviewed this visit You Were Diagnosed With   
  
 Codes Comments Essential hypertension    -  Primary ICD-10-CM: I10 
ICD-9-CM: 401.9 Vitals BP Pulse Height(growth percentile) Weight(growth percentile) SpO2 BMI  
 (!) 151/92 98 5' (1.524 m) 183 lb (83 kg) 97% 35.74 kg/m2 OB Status Smoking Status Hysterectomy Current Some Day Smoker BMI and BSA Data Body Mass Index Body Surface Area 35.74 kg/m 2 1.87 m 2 Preferred Pharmacy Pharmacy Name Phone [address] [facility] [phone] Your Updated Medication List  
  
   
This list is accurate as of 3/5/18  1:18 PM.  Always use your most recent med list.  
  
  
  
  
 albuterol 90 mcg/actuation inhaler Commonly known as:  PROVENTIL HFA, VENTOLIN HFA, PROAIR HFA Take 2 Puffs by inhalation every four (4) hours as needed for Wheezing. Indications: Chronic Obstructive Pulmonary Disease  
  
 alcohol swabs Padm Test bs daily. E11.65  
  
 alendronate 70 mg tablet Commonly known as:  FOSAMAX Take 1 Tab by mouth every seven (7) days. ALEVE 220 mg Cap Generic drug:  naproxen sodium Take  by mouth. Blood Glucose Control High&Low Soln Commonly known as:  ACCU-CHEK KEVIN CONTROL SOLN  
Use as directed. E11.65 Blood-Glucose Meter Misc Commonly known as:  ACCU-CHEK KEVIN PLUS METER Check blood glucose twice daily and as needed. E11.49  
  
 carvedilol 6.25 mg tablet Commonly known as:  COREG  
TAKE ONE TABLET BY MOUTH TWICE DAILY WITH MEALS  
  
 clopidogrel 75 mg Tab Commonly known as:  PLAVIX Take 1 Tab by mouth daily. clotrimazole-betamethasone topical cream  
Commonly known as:  Jatinder Rivera Apply bid to affected area (pea-sized amount) diazePAM 5 mg tablet Commonly known as:  VALIUM Take 5 mg by mouth every six (6) hours as needed for Anxiety. gabapentin 100 mg capsule Commonly known as:  NEURONTIN Take 200 mg by mouth nightly. glipiZIDE 10 mg tablet Commonly known as:  GLUCOTROL  
TAKE ONE TABLET BY MOUTH TWICE DAILY  
  
 glucose blood VI test strips strip Commonly known as:  ACCU-CHEK KEVIN PLUS TEST STRP Check blood glucose twice daily and as needed. E11.49 HYDROcodone-acetaminophen 5-325 mg per tablet Commonly known as:  [employer] Take 1 Tab by mouth two (2) times daily as needed for Pain. Max Daily Amount: 2 Tabs. Indications: chronic pain  
  
 insulin regular 100 unit/mL injection Commonly known as:  NOVOLIN R, HUMULIN R  
20 Units by SubCUTAneous route three (3) times daily. insulin syringe-needle U-100 Dispense ultrafine 0.5 mL syringes with 31 gauge needle  
  
 ipratropium 0.02 % Soln Commonly known as:  ATROVENT  
2.5 mL by Nebulization route every four (4) hours (while awake). Indications: PREVENTION OF BRONCHOSPASM WITH CHRONIC BRONCHITIS Iron 325 mg (65 mg iron) tablet Generic drug:  ferrous sulfate Take 325 mg by mouth Daily (before breakfast). Indications: Iron Deficiency Anemia Lancets Misc Commonly known as:  ACCU-CHEK SOFTCLIX LANCETS Check blood glucose twice daily and as needed. E11.49  
  
 lisinopril 10 mg tablet Commonly known as:  PRINIVIL, ZESTRIL  
TAKE ONE TABLET BY MOUTH ONCE DAILY  
  
 metFORMIN 1,000 mg tablet Commonly known as:  GLUCOPHAGE  
TAKE ONE TABLET BY MOUTH TWICE DAILY WITH FOOD  
  
 pravastatin 40 mg tablet Commonly known as:  PRAVACHOL Take 40 mg by mouth daily. We Performed the Following AMB POC EKG ROUTINE W/ 12 LEADS, INTER & REP [27052 CPT(R)] Follow-up Instructions Return in about 1 year (around 3/5/2019). Introducing [facility]! Dear Jayla Medina: 
Thank you for requesting a [employer] account. Our records indicate that you have previously registered for a [employer] account but its currently inactive. Please call our [employer] support line at [phone]. Additional Information If you have questions, please visit the Frequently Asked Questions section of the [employer] website at [email or web address] Remember, [employer] is NOT to be used for urgent needs. For medical emergencies, dial 911. Now available from your iPhone and Android! Please provide this summary of care documentation to your next provider. Your primary care clinician is listed as Norma Emanuel. If you have any questions after today's visit, please call [phone].

## 2018-03-05 NOTE — PROGRESS NOTES
1. Have you been to the ER, urgent care clinic since your last visit? Hospitalized since your last visit? No    2. Have you seen or consulted any other health care providers outside of the [address] [place] [address] since your last visit? Include any pap smears or colon screening.  No
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
## 2018-03-06 ENCOUNTER — HOSPITAL ENCOUNTER (OUTPATIENT)
Dept: PREADMISSION TESTING | Age: 68
Discharge: HOME | End: 2018-03-06
Payer: MEDICARE

## 2018-03-06 DIAGNOSIS — Z01.818 PRE-OP TESTING: ICD-10-CM

## 2018-03-06 LAB
ABO + RH BLD: NORMAL
ALBUMIN SERPL-MCNC: 3.7 G/DL (ref 3.4–5)
ALBUMIN/GLOB SERPL: 1.1 {RATIO} (ref 0.8–1.7)
ALP SERPL-CCNC: 97 U/L (ref 45–117)
ALT SERPL-CCNC: 23 U/L (ref 13–56)
ANION GAP SERPL CALC-SCNC: 8 MMOL/L (ref 3–18)
AST SERPL-CCNC: 12 U/L (ref 15–37)
BILIRUB SERPL-MCNC: 0.2 MG/DL (ref 0.2–1)
BLOOD GROUP ANTIBODIES SERPL: NORMAL
BUN SERPL-MCNC: 25 MG/DL (ref 7–18)
BUN/CREAT SERPL: 23 (ref 12–20)
CALCIUM SERPL-MCNC: 9.3 MG/DL (ref 8.5–10.1)
CHLORIDE SERPL-SCNC: 103 MMOL/L (ref 100–108)
CO2 SERPL-SCNC: 24 MMOL/L (ref 21–32)
CREAT SERPL-MCNC: 1.1 MG/DL (ref 0.6–1.3)
ERYTHROCYTE [DISTWIDTH] IN BLOOD BY AUTOMATED COUNT: 13.5 % (ref 11.6–14.5)
GLOBULIN SER CALC-MCNC: 3.4 G/DL (ref 2–4)
GLUCOSE SERPL-MCNC: 182 MG/DL (ref 74–99)
HCT VFR BLD AUTO: 33.7 % (ref 35–45)
HGB BLD-MCNC: 11.4 G/DL (ref 12–16)
MCH RBC QN AUTO: 30.5 PG (ref 24–34)
MCHC RBC AUTO-ENTMCNC: 33.8 G/DL (ref 31–37)
MCV RBC AUTO: 90.1 FL (ref 74–97)
PLATELET # BLD AUTO: 277 K/UL (ref 135–420)
PMV BLD AUTO: 8.9 FL (ref 9.2–11.8)
POTASSIUM SERPL-SCNC: 4.5 MMOL/L (ref 3.5–5.5)
PROT SERPL-MCNC: 7.1 G/DL (ref 6.4–8.2)
RBC # BLD AUTO: 3.74 M/UL (ref 4.2–5.3)
SODIUM SERPL-SCNC: 135 MMOL/L (ref 136–145)
SPECIMEN EXP DATE BLD: NORMAL
WBC # BLD AUTO: 7.1 K/UL (ref 4.6–13.2)

## 2018-03-06 PROCEDURE — 80053 COMPREHEN METABOLIC PANEL: CPT | Performed by: ORTHOPAEDIC SURGERY

## 2018-03-06 PROCEDURE — 86900 BLOOD TYPING SEROLOGIC ABO: CPT | Performed by: ORTHOPAEDIC SURGERY

## 2018-03-06 PROCEDURE — 36415 COLL VENOUS BLD VENIPUNCTURE: CPT | Performed by: ORTHOPAEDIC SURGERY

## 2018-03-06 PROCEDURE — 85027 COMPLETE CBC AUTOMATED: CPT | Performed by: ORTHOPAEDIC SURGERY

## 2018-03-08 ENCOUNTER — HOSPITAL ENCOUNTER (OUTPATIENT)
Dept: PREADMISSION TESTING | Age: 68
Discharge: HOME | End: 2018-03-08
Payer: MEDICARE

## 2018-03-08 DIAGNOSIS — Z01.818 PRE-OP TESTING: ICD-10-CM

## 2018-03-08 LAB — HBA1C MFR BLD: 8.1 % (ref 4.2–5.6)

## 2018-03-08 PROCEDURE — 36415 COLL VENOUS BLD VENIPUNCTURE: CPT | Performed by: NURSE PRACTITIONER

## 2018-03-08 PROCEDURE — 83036 HEMOGLOBIN GLYCOSYLATED A1C: CPT | Performed by: NURSE PRACTITIONER

## 2018-03-22 ENCOUNTER — TELEPHONE (OUTPATIENT)
Dept: FAMILY MEDICINE CLINIC | Age: 68
End: 2018-03-22

## 2018-03-22 NOTE — TELEPHONE ENCOUNTER
Patient is needed an order for an A1C to be drawn prior to her surgery date. PSRs will call the patient once the orders have been placed.

## 2018-03-23 NOTE — TELEPHONE ENCOUNTER
Patient has a wedding to go to May 26th. If she can't have surgery right now (with recovery time) she wants to delay until June after she travels for the wedding. States her blood sugar goes from  daily. She will keep a log and bring it in 2 weeks so her insulin can be adjusted. Will call us after her trip to have A1C done and follow- up/pre-op appt.

## 2018-03-23 NOTE — TELEPHONE ENCOUNTER
Spoke to pt. She was told by Leon Vazquez at Dr. Lucas Mahoney office that her A1C has to be lower for the dr to proceed for surgery. I explained to Leon Vazquez, surgery scheduler at [phone] , that pt's A1C should not be checked more often then every 3 months. She understands. She asked that we order the next A1C for the patient and if it's better schedule her for a new pre-op visit for clearance. The surgery is 4 hours and the surgeon is not comfortable doing it with her A1C so high. Adds that their office can order the rest of the labs cbc cmp if the her A1C is better. Asks that I call the patient back to let her know. Any further advice?

## 2018-03-23 NOTE — TELEPHONE ENCOUNTER
Can check a1c on 4/8 or after. Pt may get bill b/c insurance sometimes will not pay for test if done more frequently than q3mo. Pt should be checking bs before meals. If she brings in log, I can adjust insulin dosing to help get A1c <7 per cardiology and my recs.

## 2018-04-02 RX ORDER — METFORMIN HYDROCHLORIDE 1000 MG/1
TABLET ORAL
Qty: 180 TAB | Refills: 0 | Status: SHIPPED | OUTPATIENT
Start: 2018-04-02 | End: 2018-10-24 | Stop reason: SDUPTHER

## 2018-04-09 DIAGNOSIS — I10 ESSENTIAL HYPERTENSION: ICD-10-CM

## 2018-04-09 DIAGNOSIS — I25.10 CORONARY ARTERY DISEASE INVOLVING NATIVE HEART WITHOUT ANGINA PECTORIS, UNSPECIFIED VESSEL OR LESION TYPE: ICD-10-CM

## 2018-04-09 DIAGNOSIS — M54.16 LEFT LUMBAR RADICULOPATHY: ICD-10-CM

## 2018-04-09 RX ORDER — LISINOPRIL 10 MG/1
TABLET ORAL
Qty: 90 TAB | Refills: 0 | Status: SHIPPED | OUTPATIENT
Start: 2018-04-09 | End: 2018-08-13 | Stop reason: SDUPTHER

## 2018-04-09 RX ORDER — DULOXETIN HYDROCHLORIDE 30 MG/1
CAPSULE, DELAYED RELEASE ORAL
Qty: 60 CAP | Refills: 2 | Status: SHIPPED | OUTPATIENT
Start: 2018-04-09 | End: 2018-07-11 | Stop reason: SDUPTHER

## 2018-05-02 RX ORDER — PRAVASTATIN SODIUM 40 MG/1
40 TABLET ORAL DAILY
Qty: 90 TAB | Refills: 1 | Status: SHIPPED | OUTPATIENT
Start: 2018-05-02 | End: 2018-09-05 | Stop reason: SDUPTHER

## 2018-05-02 NOTE — TELEPHONE ENCOUNTER
Patient pharmacy is requesting a med refill of pravastatin 40 mg    Last visit: 2/28/18    Last refill: unknown/historical provider

## 2018-05-08 ENCOUNTER — TELEPHONE (OUTPATIENT)
Dept: FAMILY MEDICINE CLINIC | Age: 68
End: 2018-05-08

## 2018-05-08 RX ORDER — PROMETHAZINE HYDROCHLORIDE 25 MG/1
25 TABLET ORAL
Qty: 20 TAB | Refills: 0 | Status: SHIPPED | OUTPATIENT
Start: 2018-05-08 | End: 2018-10-01 | Stop reason: SDUPTHER

## 2018-05-08 NOTE — TELEPHONE ENCOUNTER
Pt called , left VM at nurses station, wanted a Rx for nausea med, states she had this in the past. Pt also states she has vomited for 4 days. She also mentioned her sugars are high and she should cancel her appt. Returned pt call. She ate some spaghetti with sausage 5 days ago and has felt nausea ever since. She can tolerate liquids. States her sugars are running 200-220. She will keep a log and bring it with her on Monday 5/14 for appt.

## 2018-05-14 ENCOUNTER — OFFICE VISIT (OUTPATIENT)
Dept: FAMILY MEDICINE CLINIC | Age: 68
End: 2018-05-14

## 2018-05-14 VITALS
SYSTOLIC BLOOD PRESSURE: 120 MMHG | HEIGHT: 60 IN | RESPIRATION RATE: 20 BRPM | BODY MASS INDEX: 37.5 KG/M2 | TEMPERATURE: 98.7 F | HEART RATE: 86 BPM | WEIGHT: 191 LBS | OXYGEN SATURATION: 95 % | DIASTOLIC BLOOD PRESSURE: 70 MMHG

## 2018-05-14 DIAGNOSIS — J30.1 SEASONAL ALLERGIC RHINITIS DUE TO POLLEN: ICD-10-CM

## 2018-05-14 DIAGNOSIS — J41.0 SIMPLE CHRONIC BRONCHITIS (HCC): ICD-10-CM

## 2018-05-14 DIAGNOSIS — M79.671 PAIN IN BOTH FEET: Primary | ICD-10-CM

## 2018-05-14 DIAGNOSIS — M79.672 PAIN IN BOTH FEET: Primary | ICD-10-CM

## 2018-05-14 RX ORDER — AZELASTINE 1 MG/ML
1 SPRAY, METERED NASAL 2 TIMES DAILY
Qty: 1 BOTTLE | Refills: 3 | Status: SHIPPED | OUTPATIENT
Start: 2018-05-14 | End: 2018-09-13 | Stop reason: ALTCHOICE

## 2018-05-14 RX ORDER — CLOBETASOL PROPIONATE 0.5 MG/G
AEROSOL, FOAM TOPICAL
Qty: 100 G | Refills: 0 | Status: SHIPPED | OUTPATIENT
Start: 2018-05-14 | End: 2018-05-15 | Stop reason: CLARIF

## 2018-05-14 RX ORDER — GLIPIZIDE 10 MG/1
TABLET ORAL
Qty: 180 TAB | Refills: 0 | Status: SHIPPED | OUTPATIENT
Start: 2018-05-14 | End: 2018-12-05 | Stop reason: SDUPTHER

## 2018-05-14 NOTE — MR AVS SNAPSHOT
[address] [facility] 
 
 
 [address] [facility] [id number] [phone] Patient: [place] MRN: [id number] CRAIG:[birth year] Visit Information Date & Time Provider Department Dept. Phone Encounter #  
 5/14/2018 11:15 AM Ana Cristina Martinez [facility] [phone] [id number] Your Appointments 5/14/2018 11:15 AM  
Office Visit with Collin Dillard MD  
[facility] Appt Note: 3 month f/u  kmb; f/u dm  
 [address] [facility] [address] [phone]  
  
   
 [address] Upcoming [facility] Maintenance Date Due  
 EYE EXAM RETINAL OR DILATED Q1 2/7/2018 MEDICARE YEARLY EXAM 7/18/2018 Influenza Age 5 to Adult 8/1/2018 HEMOGLOBIN A1C Q6M 9/8/2018 GLAUCOMA SCREENING Q2Y 2/7/2019 FOOT EXAM Q1 2/14/2019 MICROALBUMIN Q1 2/28/2019 LIPID PANEL Q1 2/28/2019 BREAST CANCER SCRN MAMMOGRAM 8/11/2019 COLONOSCOPY 10/6/2019 DTaP/Tdap/Td series (2 - Td) 7/26/2026 Allergies as of 5/14/2018  Review Complete On: 5/14/2018 By: Josephine Geronimo Severity Noted Reaction Type Reactions Percocet [Oxycodone-acetaminophen]  10/08/2010    Rash, Itching Can Take Percocet but must take Benadryl for itching Perfume Ht52  02/05/2015    Rash Perfume specific:  MUSK Pravastatin  09/26/2017    Itching Not sure Current Immunizations  Reviewed on 4/18/2016 Name Date Influenza High Dose Vaccine PF 8/21/2017 11:43 AM, 10/19/2016  9:33 AM  
 Influenza Vaccine 10/19/2015  8:47 AM, 10/17/2013 Influenza Vaccine Whole 12/1/2008 Pneumococcal Polysaccharide (PPSV-23) 4/18/2016  1:50 PM  
 ZZZ-RETIRED (DO NOT USE) Pneumococcal Vaccine (Unspecified Type) 12/1/2008 Not reviewed this visit You Were Diagnosed With   
  
 Codes Comments Pain in both feet    -  Primary ICD-10-CM: M79.671, I28.143 ICD-9-CM: 729.5 Uncontrolled type 2 diabetes mellitus with other neurologic complication, with long-term current use of insulin (HCC)     ICD-10-CM: E11.49, Z79.4, E11.65 ICD-9-CM: 250.62, V58.67 Simple chronic bronchitis (HCC)     ICD-10-CM: J41.0 ICD-9-CM: 491.0 Seasonal allergic rhinitis due to pollen     ICD-10-CM: J30.1 ICD-9-CM: 477.0 Vitals BP Pulse Temp Resp Height(growth percentile) Weight(growth percentile) 120/70 (BP 1 Location: Right arm, BP Patient Position: Sitting) 86 98.7 °F (37.1 °C) (Oral) 20 5' (1.524 m) 191 lb (86.6 kg) SpO2 BMI OB Status Smoking Status 95% 37.3 kg/m2 Hysterectomy Current Some Day Smoker Vitals History BMI and BSA Data Body Mass Index Body Surface Area  
 37.3 kg/m 2 1.91 m 2 Preferred Pharmacy Pharmacy Name Phone [address] [phone] Your Updated Medication List  
  
   
This list is accurate as of 5/14/18 11:02 AM.  Always use your most recent med list.  
  
  
  
  
 albuterol 90 mcg/actuation inhaler Commonly known as:  PROVENTIL HFA, VENTOLIN HFA, PROAIR HFA Take 2 Puffs by inhalation every four (4) hours as needed for Wheezing. Indications: Chronic Obstructive Pulmonary Disease  
  
 alcohol swabs Padm Test bs daily. E11.65  
  
 alendronate 70 mg tablet Commonly known as:  FOSAMAX Take 1 Tab by mouth every seven (7) days. ALEVE 220 mg Cap Generic drug:  naproxen sodium Take  by mouth. azelastine 137 mcg (0.1 %) nasal spray Commonly known as:  ASTELIN  
1 Spray by Both Nostrils route two (2) times a day. Use in each nostril as directed Blood Glucose Control High&Low Soln Commonly known as:  ACCU-CHEK KEVIN CONTROL SOLN  
Use as directed. E11.65 Blood-Glucose Meter Misc Commonly known as:  ACCU-CHEK KEVIN PLUS METER  
 Check blood glucose twice daily and as needed. E11.49  
  
 carvedilol 6.25 mg tablet Commonly known as:  COREG  
TAKE ONE TABLET BY MOUTH TWICE DAILY WITH MEALS  
  
 clobetasol 0.05 % topical foam  
Commonly known as:  OLUX Apply  to affected area two (2) times daily as needed for Skin Irritation. use thin film on affected area  
  
 clopidogrel 75 mg Tab Commonly known as:  PLAVIX Take 1 Tab by mouth daily. clotrimazole-betamethasone topical cream  
Commonly known as:  Nolberto Tu Apply bid to affected area (pea-sized amount) diazePAM 5 mg tablet Commonly known as:  VALIUM Take 5 mg by mouth every six (6) hours as needed for Anxiety. DULoxetine 30 mg capsule Commonly known as:  CYMBALTA TAKE ONE CAPSULE BY MOUTH AT BEDTIME FOR 7 DAYS , THEN INCREASE TWO CAPSULES BY MOUTH  AT BEDTIME  
  
 gabapentin 100 mg capsule Commonly known as:  NEURONTIN Take 200 mg by mouth nightly. glipiZIDE 10 mg tablet Commonly known as:  GLUCOTROL  
TAKE ONE TABLET BY MOUTH TWICE DAILY  
  
 glucose blood VI test strips strip Commonly known as:  ACCU-CHEK KEVIN PLUS TEST STRP Check blood glucose twice daily and as needed. E11.49 HYDROcodone-acetaminophen 5-325 mg per tablet Commonly known as:  Omalley Leander Take 1 Tab by mouth two (2) times daily as needed for Pain. Max Daily Amount: 2 Tabs. Indications: chronic pain  
  
 insulin regular 100 unit/mL injection Commonly known as:  NOVOLIN R, HUMULIN R  
30 Units by SubCUTAneous route three (3) times daily. insulin syringe-needle U-100 Dispense ultrafine 0.5 mL syringes with 31 gauge needle  
  
 ipratropium 0.02 % Soln Commonly known as:  ATROVENT  
2.5 mL by Nebulization route every four (4) hours (while awake). Indications: PREVENTION OF BRONCHOSPASM WITH CHRONIC BRONCHITIS Iron 325 mg (65 mg iron) tablet Generic drug:  ferrous sulfate Take 325 mg by mouth Daily (before breakfast).  Indications: Iron Deficiency Anemia Lancets Misc Commonly known as:  ACCU-CHEK SOFTCLIX LANCETS Check blood glucose twice daily and as needed. E11.49  
  
 lisinopril 10 mg tablet Commonly known as:  PRINIVIL, ZESTRIL  
TAKE ONE TABLET BY MOUTH ONCE DAILY  
  
 metFORMIN 1,000 mg tablet Commonly known as:  GLUCOPHAGE  
TAKE ONE TABLET BY MOUTH TWICE DAILY WITH FOOD  
  
 pravastatin 40 mg tablet Commonly known as:  PRAVACHOL Take 1 Tab by mouth daily. promethazine 25 mg tablet Commonly known as:  PHENERGAN Take 1 Tab by mouth every eight (8) hours as needed for Nausea. umeclidinium 62.5 mcg/actuation inhaler Commonly known as:  INCRUSE ELLIPTA Take 1 Puff by inhalation daily. Prescriptions Sent to Pharmacy Refills  
 insulin regular (NOVOLIN R, HUMULIN R) 100 unit/mL injection 0 Si Units by SubCUTAneous route three (3) times daily. Class: Normal  
 Pharmacy: [address] [place] [address] #: [phone] Route: SubCUTAneous  
 glipiZIDE (GLUCOTROL) 10 mg tablet 0 Sig: TAKE ONE TABLET BY MOUTH TWICE DAILY Class: Normal  
 Pharmacy: [address] [place] [address] #: [phone]  
 azelastine (ASTELIN) 137 mcg (0.1 %) nasal spray 3 Si [place] by Both Nostrils route two (2) times a day. Use in each nostril as directed Class: Normal  
 Pharmacy: [address] [place] [address] #: [phone] Route: Both Nostrils  
 umeclidinium (INCRUSE ELLIPTA) 62.5 mcg/actuation inhaler 1 Sig: Take 1 Puff by inhalation daily. Class: Normal  
 Pharmacy: [address] [place] [address] #: [phone] Route: Inhalation  
 clobetasol (OLUX) 0.05 % topical foam 0 Sig: Apply  to affected area two (2) times daily as needed for Skin Irritation. use thin film on affected area  Class: Normal  
 Pharmacy: [address] [place] [address] #: [phone] Route: Topical  
  
We Performed the Following REFERRAL TO PODIATRY [REF90 Custom] Referral Information Referral ID Referred By Referred To  
  
 [id number] Virgie Lunsford DPM   
   [address], [address] [facility] Phone: [phone] Fax: [phone] Visits Status Start Date End Date 1 New Request 5/14/18 5/14/19 If your referral has a status of pending review or denied, additional information will be sent to support the outcome of this decision. Introducing [facility]! Dear Anand Brown: 
Thank you for requesting a [employer] account. Our records indicate that you have previously registered for a [employer] account but its currently inactive. Please call our [employer] support line at [phone]. Additional Information If you have questions, please visit the Frequently Asked Questions section of the [employer] website at [email or web address] Remember, [employer] is NOT to be used for urgent needs. For medical emergencies, dial 911. Now available from your iPhone and Android! Please provide this summary of care documentation to your next provider. Your primary care clinician is listed as Norma Emanuel. If you have any questions after today's visit, please call [phone].

## 2018-05-14 NOTE — PROGRESS NOTES
Assessment/Plan:    1. Uncontrolled type 2 diabetes mellitus with other neurologic complication, with long-term current use of insulin (HCC)  -incr insulin to 30 units. F/u in 1mo. Get eye note from [facility] [place]. - insulin regular (NOVOLIN R, HUMULIN R) 100 unit/mL injection; 30 Units by SubCUTAneous route three (3) times daily. Dispense: 10 Vial; Refill: 0  - glipiZIDE (GLUCOTROL) 10 mg tablet; TAKE ONE TABLET BY MOUTH TWICE DAILY  Dispense: 180 Tab; Refill: 0  - REFERRAL TO PODIATRY    2. Pain in both feet  - REFERRAL TO PODIATRY    3. Simple chronic bronchitis (HCC)  -start incruze. - umeclidinium (INCRUSE ELLIPTA) 62.5 mcg/actuation inhaler; Take 1 Puff by inhalation daily. Dispense: 1 Inhaler; Refill: 1    4. Seasonal allergic rhinitis due to pollen  -add astelin  - azelastine (ASTELIN) 137 mcg (0.1 %) nasal spray; 1 [place] by Both Nostrils route two (2) times a day. Use in each nostril as directed  Dispense: 1 Bottle; Refill: 3    5. Scalp itch - trial topical steroid    The plan was discussed with the patient. The patient verbalized understanding and is in agreement with the plan. All medication potential side effects were discussed with the patient.     Health Maintenance:   Health Maintenance   Topic Date Due    EYE EXAM RETINAL OR DILATED Q1  02/07/2018    MEDICARE YEARLY EXAM  07/18/2018    Influenza Age 9 to Adult  08/01/2018    HEMOGLOBIN A1C Q6M  09/08/2018    GLAUCOMA SCREENING Q2Y  02/07/2019    FOOT EXAM Q1  02/14/2019    MICROALBUMIN Q1  02/28/2019    LIPID PANEL Q1  02/28/2019    BREAST CANCER SCRN MAMMOGRAM  08/11/2019    COLONOSCOPY  10/06/2019    DTaP/Tdap/Td series (2 - Td) 07/26/2026    Hepatitis C Screening  Completed    Bone Densitometry (Dexa) Screening  Completed    ZOSTER VACCINE AGE 60>  Addressed    Pneumococcal 65+ Low/Medium Risk  Completed       Betty Yoon is a 80 y.o. female and presents with Diabetes and Hypertension     Subjective:  DM2 - she brings her bs log. On insulin 20units TID, metformin and glipizide. States \"my sugars are all over the place\". bs running 200-300s before all meals. Had one low of 56, in setting of nausea and eating less. For breakfast - has 2 waffles and 2 eggs and coffee. Lunch is a ham sandwich, banana or apple, diet soda. Dinner is a steak and potato. She eats sugarfree chocolate for dessert. Pt c/o nonproductive cough. She has itching ears and nose. Her eyes water. On flonase w/o relief. She c/o scalp itch. Tried dandruff shampoo w/o relief. ROS:  Constitutional: No recent weight change. No weakness/fatigue. No f/c. Cardiovascular: No CP/palpitations. No NINA/orthopnea/PND. Respiratory: No cough/sputum, dyspnea, wheezing. Gastointestinal: No dysphagia, reflux. No n/v. No constipation/diarrhea. No melena/rectal bleeding. The problem list was updated as a part of today's visit.   Patient Active Problem List   Diagnosis Code    Vitamin D deficiency E55.9    Hx of breast cancer Z85.3    Chronic pain syndrome G89.4    Osteoarthrosis, unspecified whether generalized or localized, unspecified site J24.61    Diastolic congestive heart failure (HCC) I50.30    COPD (chronic obstructive pulmonary disease) (HCC) J44.9    GERD (gastroesophageal reflux disease) K21.9    Tobacco dependence F17.200    Chronic radicular lumbar pain M54.16, G89.29    Scoliosis M41.9    Essential hypertension I10    Pure hypercholesterolemia E78.00    CAD (coronary artery disease) I25.10    Spinal stenosis of lumbar region with neurogenic claudication M48.062    S/P lumbar fusion Z98.1    Osteoporosis M81.0    Hemianopsia H53.47    Reactive depression F32.9    Stenosis of left carotid artery I65.22    Carpal tunnel syndrome of right wrist G56.01    Vomiting R11.10    Pain of upper abdomen R10.10    Sacral pain M53.3    Type 2 diabetes mellitus with neurologic complication, with long-term current use of insulin ([facility] E11.49, Z79.4    History of compression fracture of spine Z87.81    History of stroke Z86.73    Left lumbar radiculopathy M54.16    Sacroiliac joint pain M53.3    Stage 3 chronic kidney disease N18.3    Type 2 diabetes with nephropathy (HCC) E11.21       The PSH, FH were reviewed. SH:  Social History   Substance Use Topics    Smoking status: Current Some Day Smoker     Packs/day: 0.50     Years: 35.00     Types: Cigarettes     Last attempt to quit: 2/20/2017    Smokeless tobacco: Never Used    Alcohol use No       Medications/Allergies:  Current Outpatient Prescriptions on File Prior to Visit   Medication Sig Dispense Refill    promethazine (PHENERGAN) 25 mg tablet Take 1 Tab by mouth every eight (8) hours as needed for Nausea. 20 Tab 0    pravastatin (PRAVACHOL) 40 mg tablet Take 1 Tab by mouth daily. 90 Tab 1    lisinopril (PRINIVIL, ZESTRIL) 10 mg tablet TAKE ONE TABLET BY MOUTH ONCE DAILY 90 Tab 0    DULoxetine (CYMBALTA) 30 mg capsule TAKE ONE CAPSULE BY MOUTH AT BEDTIME FOR 7 DAYS , THEN INCREASE TWO CAPSULES BY MOUTH  AT BEDTIME 60 Cap 2    metFORMIN (GLUCOPHAGE) 1,000 mg tablet TAKE ONE TABLET BY MOUTH TWICE DAILY WITH FOOD 180 Tab 0    HYDROcodone-acetaminophen (NORCO) 5-325 mg per tablet Take 1 Tab by mouth two (2) times daily as needed for Pain. Max Daily Amount: 2 Tabs. Indications: chronic pain 42 Tab 0    diazePAM (VALIUM) 5 mg tablet Take 5 mg by mouth every six (6) hours as needed for Anxiety.  carvedilol (COREG) 6.25 mg tablet TAKE ONE TABLET BY MOUTH TWICE DAILY WITH MEALS 180 Tab 0    insulin regular (NOVOLIN R, HUMULIN R) 100 unit/mL injection 20 Units by SubCUTAneous route three (3) times daily. 5 Vial 0    ferrous sulfate (IRON) 325 mg (65 mg iron) tablet Take 325 mg by mouth Daily (before breakfast). Indications: Iron Deficiency Anemia      gabapentin (NEURONTIN) 100 mg capsule Take 200 mg by mouth nightly.       naproxen sodium (ALEVE) 220 mg cap Take  by mouth.      glipiZIDE (GLUCOTROL) 10 mg tablet TAKE ONE TABLET BY MOUTH TWICE DAILY 180 Tab 0    alcohol swabs padm Test bs daily. E11.65 300 Pad 3    Blood Glucose Control High&Low (ACCU-CHEK KEVIN CONTROL SOLN) soln Use as directed. E11.65 3 mL 0    Blood-Glucose Meter (ACCU-CHEK KEVIN PLUS METER) misc Check blood glucose twice daily and as needed. E11.49 1 Each 0    glucose blood VI test strips (ACCU-CHEK KEVIN PLUS TEST STRP) strip Check blood glucose twice daily and as needed. E11.49 200 Strip 3    Lancets (ACCU-CHEK SOFTCLIX LANCETS) misc Check blood glucose twice daily and as needed. E11.49 200 Each 3    alendronate (FOSAMAX) 70 mg tablet Take 1 Tab by mouth every seven (7) days. 30 Tab 1    clotrimazole-betamethasone (LOTRISONE) topical cream Apply bid to affected area (pea-sized amount) 15 g 0    clopidogrel (PLAVIX) 75 mg tab Take 1 Tab by mouth daily. (Patient taking differently: Take 150 mg by mouth two (2) times a day.) 90 Tab 3    ipratropium (ATROVENT) 0.02 % nebulizer solution 2.5 mL by Nebulization route every four (4) hours (while awake). Indications: PREVENTION OF BRONCHOSPASM WITH CHRONIC BRONCHITIS 60 mL 1    albuterol (PROVENTIL HFA, VENTOLIN HFA, PROAIR HFA) 90 mcg/actuation inhaler Take 2 Puffs by inhalation every four (4) hours as needed for Wheezing. Indications: Chronic Obstructive Pulmonary Disease 1 Inhaler 1    insulin syringe-needle U-100 Dispense ultrafine 0.5 mL syringes with 31 gauge needle 100 Syringe 11     No current facility-administered medications on file prior to visit.          Allergies   Allergen Reactions    Percocet [Oxycodone-Acetaminophen] Rash and Itching     Can Take Percocet but must take Benadryl for itching     Perfume Ht52 Rash     Perfume specific:  MUSK    Pravastatin Itching     Not sure       Objective:  Visit Vitals    /70 (BP 1 Location: Right arm, BP Patient Position: Sitting)    Pulse 86    Temp 98.7 °F (37.1 °C) (Oral)    Resp 20  Ht 5' (1.524 m)    Wt 191 lb (86.6 kg)    SpO2 95%    BMI 37.3 kg/m2      Constitutional: Well developed, nourished, no distress, alert, obese habitus   CV: S1, S2.  RRR. No murmurs/rubs. No thrills palpated. No carotid bruits. Intact distal pulses. No edema. Pulm: No abnormalities on inspection. End expir wheezing. Normal effort. GI: Soft, nontender, nondistended. Normal active bowel sounds.

## 2018-05-14 NOTE — PROGRESS NOTES
Anastasia Jay is a 80 y.o. female (: 1950) presenting to address:    Chief Complaint   Patient presents with    Diabetes    Hypertension       Vitals:    18 1040   BP: 120/70   Pulse: 86   Resp: 20   Temp: 98.7 °F (37.1 °C)   TempSrc: Oral   SpO2: 95%   Weight: 191 lb (86.6 kg)   Height: 5' (1.524 m)   PainSc:   5   PainLoc: Back       Learning Assessment:     Learning Assessment 10/22/2015   PRIMARY LEARNER Patient   HIGHEST LEVEL OF EDUCATION - PRIMARY LEARNER  -   BARRIERS PRIMARY LEARNER -   CO-LEARNER CAREGIVER -   PRIMARY LANGUAGE ENGLISH   LEARNER PREFERENCE PRIMARY READING   ANSWERED BY pt   RELATIONSHIP SELF     Depression Screening:     PHQ over the last two weeks 2018   PHQ Not Done Active Diagnosis of Depression or Bipolar Disorder   Little interest or pleasure in doing things -   Feeling down, depressed or hopeless -   Total Score PHQ 2 -   Trouble falling or staying asleep, or sleeping too much -   Feeling tired or having little energy -   Poor appetite or overeating -   Feeling bad about yourself - or that you are a failure or have let yourself or your family down -   Trouble concentrating on things such as school, work, reading or watching TV -   Moving or speaking so slowly that other people could have noticed; or the opposite being so fidgety that others notice -   Thoughts of being better off dead, or hurting yourself in some way -   PHQ 9 Score -   How difficult have these problems made it for you to do your work, take care of your home and get along with others -     Fall Risk Assessment:     Fall Risk Assessment, last 12 mths 2018   Able to walk? Yes   Fall in past 12 months? No   Fall with injury? -   Number of falls in past 12 months -   Fall Risk Score -     Abuse Screening:     Abuse Screening Questionnaire 2018   Do you ever feel afraid of your partner? N   Are you in a relationship with someone who physically or mentally threatens you?  N   Is it safe for you to go home? Y     Coordination of Care Questionaire:   1. Have you been to the ER, urgent care clinic since your last visit? Hospitalized since your last visit? NO    2. Have you seen or consulted any other health care providers outside of the [address] [place] [address] since your last visit? Include any pap smears or colon screening. NO    Advanced Directive:   1. Do you have an Advanced Directive? YES    2. Would you like information on Advanced Directives?  NO

## 2018-05-15 RX ORDER — MOMETASONE FUROATE 1 MG/ML
SOLUTION TOPICAL
Qty: 60 ML | Refills: 0 | Status: SHIPPED | OUTPATIENT
Start: 2018-05-15 | End: 2019-03-14 | Stop reason: ALTCHOICE

## 2018-05-31 RX ORDER — HUMAN INSULIN 100 [IU]/ML
INJECTION, SOLUTION SUBCUTANEOUS
Qty: 50 VIAL | Refills: 0 | Status: SHIPPED | OUTPATIENT
Start: 2018-05-31 | End: 2018-06-14 | Stop reason: SDUPTHER

## 2018-06-12 DIAGNOSIS — I25.10 CORONARY ARTERY DISEASE INVOLVING NATIVE HEART WITHOUT ANGINA PECTORIS, UNSPECIFIED VESSEL OR LESION TYPE: ICD-10-CM

## 2018-06-13 RX ORDER — CARVEDILOL 6.25 MG/1
TABLET ORAL
Qty: 180 TAB | Refills: 0 | Status: SHIPPED | OUTPATIENT
Start: 2018-06-13 | End: 2018-06-14 | Stop reason: SDUPTHER

## 2018-06-14 ENCOUNTER — OFFICE VISIT (OUTPATIENT)
Dept: FAMILY MEDICINE CLINIC | Age: 68
End: 2018-06-14

## 2018-06-14 VITALS
RESPIRATION RATE: 17 BRPM | TEMPERATURE: 98.3 F | WEIGHT: 192 LBS | HEART RATE: 87 BPM | BODY MASS INDEX: 37.69 KG/M2 | OXYGEN SATURATION: 99 % | SYSTOLIC BLOOD PRESSURE: 150 MMHG | DIASTOLIC BLOOD PRESSURE: 84 MMHG | HEIGHT: 60 IN

## 2018-06-14 DIAGNOSIS — I25.10 CORONARY ARTERY DISEASE INVOLVING NATIVE HEART WITHOUT ANGINA PECTORIS, UNSPECIFIED VESSEL OR LESION TYPE: ICD-10-CM

## 2018-06-14 DIAGNOSIS — J41.0 SIMPLE CHRONIC BRONCHITIS (HCC): ICD-10-CM

## 2018-06-14 DIAGNOSIS — I10 ESSENTIAL HYPERTENSION: ICD-10-CM

## 2018-06-14 LAB — HBA1C MFR BLD HPLC: 8.7 %

## 2018-06-14 RX ORDER — GABAPENTIN 300 MG/1
300 CAPSULE ORAL DAILY
COMMUNITY
Start: 2018-05-25 | End: 2019-01-15 | Stop reason: ALTCHOICE

## 2018-06-14 RX ORDER — CARVEDILOL 12.5 MG/1
TABLET ORAL
Qty: 180 TAB | Refills: 0 | Status: SHIPPED | OUTPATIENT
Start: 2018-06-14 | End: 2018-09-05 | Stop reason: SDUPTHER

## 2018-06-14 NOTE — MR AVS SNAPSHOT
[address] [place] [address]  [address] [facility] [address] [phone] Patient: Anastasia Jay MRN: [id number] FINA:[id number] Visit Information Date & Time Provider Department Dept. Phone Encounter #  
 6/14/2018  7:30 AM Claudetta Elm, 3 [facility] [phone] [id number] Follow-up Instructions Return in about 1 month (around 7/14/2018) for mwv/physical.  
  
[facility] Maintenance Date Due  
 MEDICARE YEARLY EXAM 7/18/2018 Influenza Age 5 to Adult 8/1/2018 HEMOGLOBIN A1C Q6M 9/8/2018 EYE EXAM RETINAL OR DILATED Q1 10/5/2018 FOOT EXAM Q1 2/14/2019 MICROALBUMIN Q1 2/28/2019 LIPID PANEL Q1 2/28/2019 BREAST CANCER SCRN MAMMOGRAM 8/11/2019 GLAUCOMA SCREENING Q2Y 10/5/2019 COLONOSCOPY 10/6/2019 DTaP/Tdap/Td series (2 - Td) 7/26/2026 Allergies as of 6/14/2018  Review Complete On: 6/14/2018 By: Claudetta Elm, MD  
  
 Severity Noted Reaction Type Reactions Percocet [Oxycodone-acetaminophen]  10/08/2010    Rash, Itching Can Take Percocet but must take Benadryl for itching Perfume Ht52  02/05/2015    Rash Perfume specific:  MUSK Pravastatin  09/26/2017    Itching Not sure Current Immunizations  Reviewed on 4/18/2016 Name Date Influenza High Dose Vaccine PF 8/21/2017 11:43 AM, 10/19/2016  9:33 AM  
 Influenza Vaccine 10/19/2015  8:47 AM, 10/17/2013 Influenza Vaccine Whole 12/1/2008 Pneumococcal Polysaccharide (PPSV-23) 4/18/2016  1:50 PM  
 ZZZ-RETIRED (DO NOT USE) Pneumococcal Vaccine (Unspecified Type) 12/1/2008 Not reviewed this visit You Were Diagnosed With   
  
 Codes Comments Type 2 diabetes mellitus with other neurologic complication, with long-term current use of insulin (HCC)    -  Primary ICD-10-CM: E11.49, Z79.4 ICD-9-CM: 250.60, V58.67 Simple chronic bronchitis (HCC)     ICD-10-CM: J41.0 ICD-9-CM: 491.0 Essential hypertension     ICD-10-CM: I10 
ICD-9-CM: 401.9 Uncontrolled type 2 diabetes mellitus with other neurologic complication, with long-term current use of insulin (HCC)     ICD-10-CM: E11.49, Z79.4, E11.65 ICD-9-CM: 250.62, V58.67 Coronary artery disease involving native heart without angina pectoris, unspecified vessel or lesion type     ICD-10-CM: I25.10 ICD-9-CM: 414.01 Vitals BP Pulse Temp Resp Height(growth percentile) Weight(growth percentile) 150/84 87 98.3 °F (36.8 °C) (Oral) 17 5' (1.524 m) 192 lb (87.1 kg) SpO2 BMI OB Status Smoking Status 99% 37.5 kg/m2 Hysterectomy Current Some Day Smoker Vitals History BMI and BSA Data Body Mass Index Body Surface Area  
 37.5 kg/m 2 1.92 m 2 Preferred Pharmacy Pharmacy Name Phone [address] [phone] Your Updated Medication List  
  
   
This list is accurate as of 6/14/18  7:48 AM.  Always use your most recent med list.  
  
  
  
  
 albuterol 90 mcg/actuation inhaler Commonly known as:  PROVENTIL HFA, VENTOLIN HFA, PROAIR HFA Take 2 Puffs by inhalation every four (4) hours as needed for Wheezing. Indications: Chronic Obstructive Pulmonary Disease  
  
 alcohol swabs Padm Test bs daily. E11.65  
  
 alendronate 70 mg tablet Commonly known as:  FOSAMAX Take 1 Tab by mouth every seven (7) days. ALEVE 220 mg Cap Generic drug:  naproxen sodium Take  by mouth. azelastine 137 mcg (0.1 %) nasal spray Commonly known as:  ASTELIN  
1 Spray by Both Nostrils route two (2) times a day. Use in each nostril as directed Blood Glucose Control High&Low Soln Commonly known as:  ACCU-CHEK KEVIN CONTROL SOLN  
Use as directed. E11.65 Blood-Glucose Meter Misc Commonly known as:  ACCU-CHEK KEVIN PLUS METER Check blood glucose twice daily and as needed. E11.49  
  
 carvedilol 12.5 mg tablet Commonly known as:  COREG  
TAKE 1 TABLET BY MOUTH TWICE DAILY WITH MEALS  
  
 clopidogrel 75 mg Tab Commonly known as:  PLAVIX Take 1 Tab by mouth daily. clotrimazole-betamethasone topical cream  
Commonly known as:  Matheny Aragon Apply bid to affected area (pea-sized amount) diazePAM 5 mg tablet Commonly known as:  VALIUM Take 5 mg by mouth every six (6) hours as needed for Anxiety. DULoxetine 30 mg capsule Commonly known as:  CYMBALTA TAKE ONE CAPSULE BY MOUTH AT BEDTIME FOR 7 DAYS , THEN INCREASE TWO CAPSULES BY MOUTH  AT BEDTIME  
  
 gabapentin 300 mg capsule Commonly known as:  NEURONTIN Take 300 mg by mouth daily. glipiZIDE 10 mg tablet Commonly known as:  GLUCOTROL  
TAKE ONE TABLET BY MOUTH TWICE DAILY  
  
 glucose blood VI test strips strip Commonly known as:  ACCU-CHEK KEVIN PLUS TEST STRP Check blood glucose twice daily and as needed. E11.49 HYDROcodone-acetaminophen 5-325 mg per tablet Commonly known as:  Hurman Grist Take 1 Tab by mouth two (2) times daily as needed for Pain. Max Daily Amount: 2 Tabs. Indications: chronic pain  
  
 insulin regular 100 unit/mL injection Commonly known as:  NovoLIN R Regular U-100 Insuln INJECT 40 UNITS SUBCUTANEOUSLY THREE TIMES DAILY  
  
 insulin syringe-needle U-100 Dispense ultrafine 0.5 mL syringes with 31 gauge needle  
  
 ipratropium 0.02 % Soln Commonly known as:  ATROVENT  
2.5 mL by Nebulization route every four (4) hours (while awake). Indications: PREVENTION OF BRONCHOSPASM WITH CHRONIC BRONCHITIS Iron 325 mg (65 mg iron) tablet Generic drug:  ferrous sulfate Take 325 mg by mouth Daily (before breakfast). Indications: Iron Deficiency Anemia Lancets Misc Commonly known as:  ACCU-CHEK SOFTCLIX LANCETS Check blood glucose twice daily and as needed. E11.49  
  
 lisinopril 10 mg tablet Commonly known as:  PRINIVIL, ZESTRIL  
TAKE ONE TABLET BY MOUTH ONCE DAILY metFORMIN 1,000 mg tablet Commonly known as:  GLUCOPHAGE  
TAKE ONE TABLET BY MOUTH TWICE DAILY WITH FOOD  
  
 mometasone 0.1 % lotion Commonly known as:  Carlos Settler Apply to scalp. Let sit 15 min before rinsing. Use daily x 1 week. pravastatin 40 mg tablet Commonly known as:  PRAVACHOL Take 1 Tab by mouth daily. promethazine 25 mg tablet Commonly known as:  PHENERGAN Take 1 Tab by mouth every eight (8) hours as needed for Nausea. umeclidinium 62.5 mcg/actuation inhaler Commonly known as:  INCRUSE ELLIPTA Take 1 Puff by inhalation daily. Prescriptions Sent to Pharmacy Refills  
 carvedilol (COREG) 12.5 mg tablet 0 Sig: TAKE 1 TABLET BY MOUTH TWICE DAILY WITH MEALS Class: Normal  
 Pharmacy: [address] [place] [address] #: [phone] insulin regular (NOVOLIN R REGULAR U-100 INSULN) 100 unit/mL injection 0 Sig: INJECT 40 UNITS SUBCUTANEOUSLY THREE TIMES DAILY Class: Normal  
 Pharmacy: [address] [place] [address] #: [phone] We Performed the Following AMB POC HEMOGLOBIN A1C [94936 CPT(R)] Follow-up Instructions Return in about 1 month (around 7/14/2018) for mwv/physical.  
  
  
Introducing [facility]! Dear Alek Vera: 
Thank you for requesting a [employer] account. Our records indicate that you have previously registered for a [employer] account but its currently inactive. Please call our [employer] support line at [phone]. Additional Information If you have questions, please visit the Frequently Asked Questions section of the [employer] website at [email or web address] Remember, [employer] is NOT to be used for urgent needs. For medical emergencies, dial 911. Now available from your iPhone and Android! Please provide this summary of care documentation to your next provider. Your primary care clinician is listed as Norma Emanuel. If you have any questions after today's visit, please call [phone].

## 2018-06-14 NOTE — PROGRESS NOTES
Galina Oneal is a 80 y.o. female (: 1950) presenting to address:    Chief Complaint   Patient presents with    Diabetes       Vitals:    18 0722   BP: 140/82   Resp: 17   Temp: 98.3 °F (36.8 °C)   TempSrc: Oral   Weight: 192 lb (87.1 kg)   Height: 5' (1.524 m)   PainSc:   4   PainLoc: Back       Hearing/Vision:   No exam data present    Learning Assessment:     Learning Assessment 10/22/2015   PRIMARY LEARNER Patient   HIGHEST LEVEL OF EDUCATION - PRIMARY LEARNER  -   BARRIERS PRIMARY LEARNER -   CO-LEARNER CAREGIVER -   PRIMARY LANGUAGE ENGLISH   LEARNER PREFERENCE PRIMARY READING   ANSWERED BY pt   RELATIONSHIP SELF     Depression Screening:     PHQ over the last two weeks 2018   PHQ Not Done Active Diagnosis of Depression or Bipolar Disorder   Little interest or pleasure in doing things -   Feeling down, depressed or hopeless -   Total Score PHQ 2 -   Trouble falling or staying asleep, or sleeping too much -   Feeling tired or having little energy -   Poor appetite or overeating -   Feeling bad about yourself - or that you are a failure or have let yourself or your family down -   Trouble concentrating on things such as school, work, reading or watching TV -   Moving or speaking so slowly that other people could have noticed; or the opposite being so fidgety that others notice -   Thoughts of being better off dead, or hurting yourself in some way -   PHQ 9 Score -   How difficult have these problems made it for you to do your work, take care of your home and get along with others -     Fall Risk Assessment:     Fall Risk Assessment, last 12 mths 2018   Able to walk? Yes   Fall in past 12 months? No   Fall with injury? -   Number of falls in past 12 months -   Fall Risk Score -     Abuse Screening:     Abuse Screening Questionnaire 2018   Do you ever feel afraid of your partner? N   Are you in a relationship with someone who physically or mentally threatens you?  N   Is it safe for you to go home? Y     Coordination of Care Questionaire:   1. Have you been to the ER, urgent care clinic since your last visit? Hospitalized since your last visit? No     2. Have you seen or consulted any other health care providers outside of the [facility] since your last visit? Include any pap smears or colon screening? Yes, Dr Barron gordon     Advanced Directive:   1. Do you have an Advanced Directive     Yes     2. Would you like information on Advanced Directives?   No

## 2018-06-14 NOTE — PROGRESS NOTES
Assessment/Plan:    1. Uncontrolled type 2 diabetes mellitus with other neurologic complication, with long-term current use of insulin ([facility])  -A1c worse, 8.8. Advised pt to give half dose am insulin if bs>140 and she skips breakfast.  Increase insulin from 30 units tid to 40 units tid. She is to increase evening dose insulin by 2 units until fasting am sugars are<140. F/u in 1 mo with bs log.  - AMB POC HEMOGLOBIN A1C  - insulin regular (NOVOLIN R REGULAR U-100 INSULN) 100 unit/mL injection; INJECT 40 UNITS SUBCUTANEOUSLY THREE TIMES DAILY  Dispense: 100 mL; Refill: 0    2. Simple chronic bronchitis ([address]  -cont current    3. Essential hypertension and CAD  -increase coreg to 12.5mg.  F/u in 1mo. The plan was discussed with the patient. The patient verbalized understanding and is in agreement with the plan. All medication potential side effects were discussed with the patient. Health Maintenance:   Health Maintenance   Topic Date Due    MEDICARE YEARLY EXAM  07/18/2018    Influenza Age 5 to Adult  08/01/2018    HEMOGLOBIN A1C Q6M  09/08/2018    EYE EXAM RETINAL OR DILATED Q1  10/05/2018    FOOT EXAM Q1  02/14/2019    MICROALBUMIN Q1  02/28/2019    LIPID PANEL Q1  02/28/2019    BREAST CANCER SCRN MAMMOGRAM  08/11/2019    GLAUCOMA SCREENING Q2Y  10/05/2019    COLONOSCOPY  10/06/2019    DTaP/Tdap/Td series (2 - Td) 07/26/2026    Hepatitis C Screening  Completed    Bone Densitometry (Dexa) Screening  Completed    ZOSTER VACCINE AGE 60>  Addressed    Pneumococcal 65+ Low/Medium Risk  Completed       Galina Oneal is a 80 y.o. female and presents with Diabetes     Subjective:  DM2 - insulin dose was incr at last visit to 30 units TID. BS are all over 200s in glucometer (fasting an non-fasting). No lows. Is following a diabetic diet. She sometimes skips breakfast, and will hold the insulin despite sugars being 200. COPD - incruse started at last visit.   Breathing improved, but still having some wheezing. HTN - bp elevated today. ROS:  Constitutional: No recent weight change. No weakness/fatigue. No f/c. Skin: No rashes, change in nails/hair, itching   Cardiovascular: No CP/palpitations. No NINA/orthopnea/PND. Respiratory: No cough/sputum, dyspnea, wheezing. Gastointestinal: No dysphagia, reflux. No n/v. No constipation/diarrhea. No melena/rectal bleeding. The problem list was updated as a part of today's visit. Patient Active Problem List   Diagnosis Code    Vitamin D deficiency E55.9    Hx of breast cancer Z85.3    Chronic pain syndrome G89.4    Osteoarthrosis, unspecified whether generalized or localized, unspecified site T85.56    Diastolic congestive heart failure ([facility]) I50.30    COPD (chronic obstructive pulmonary disease) ([facility]) J44.9    GERD (gastroesophageal reflux disease) K21.9    Tobacco dependence F17.200    Chronic radicular lumbar pain M54.16, G89.29    Scoliosis M41.9    Essential hypertension I10    Pure hypercholesterolemia E78.00    CAD (coronary artery disease) I25.10    Spinal stenosis of lumbar region with neurogenic claudication M48.062    S/P lumbar fusion Z98.1    Osteoporosis M81.0    Hemianopsia H53.47    Reactive depression F32.9    Stenosis of left carotid artery I65.22    Carpal tunnel syndrome of right wrist G56.01    Vomiting R11.10    Pain of upper abdomen R10.10    Sacral pain M53.3    Type 2 diabetes mellitus with neurologic complication, with long-term current use of insulin ([facility]) E11.49, Z79.4    History of compression fracture of spine Z87.81    History of stroke Z86.73    Left lumbar radiculopathy M54.16    Sacroiliac joint pain M53.3    Stage 3 chronic kidney disease N18.3    Type 2 diabetes with nephropathy ([facility]) E11.21       The PSH, FH were reviewed.     SH:  Social History   Substance Use Topics    Smoking status: Current Some Day Smoker     Packs/day: 0.50     Years: 35.00     Types: Cigarettes Last attempt to quit: 2/20/2017    Smokeless tobacco: Never Used    Alcohol use No       Medications/Allergies:  Current Outpatient Prescriptions on File Prior to Visit   Medication Sig Dispense Refill    carvedilol (COREG) 6.25 mg tablet TAKE 1 TABLET BY MOUTH TWICE DAILY WITH MEALS 180 Tab 0    NOVOLIN R REGULAR U-100 INSULN 100 unit/mL injection INJECT 20 UNITS SUBCUTANEOUSLY THREE TIMES DAILY 50 Vial 0    mometasone (ELOCON) 0.1 % lotion Apply to scalp. Let sit 15 min before rinsing. Use daily x 1 week. 60 mL 0    glipiZIDE (GLUCOTROL) 10 mg tablet TAKE ONE TABLET BY MOUTH TWICE DAILY 180 Tab 0    azelastine (ASTELIN) 137 mcg (0.1 %) nasal spray 1 [place] by Both Nostrils route two (2) times a day. Use in each nostril as directed 1 Bottle 3    umeclidinium (INCRUSE ELLIPTA) 62.5 mcg/actuation inhaler Take 1 Puff by inhalation daily. 1 Inhaler 1    promethazine (PHENERGAN) 25 mg tablet Take 1 Tab by mouth every eight (8) hours as needed for Nausea. 20 Tab 0    pravastatin (PRAVACHOL) 40 mg tablet Take 1 Tab by mouth daily. 90 Tab 1    lisinopril (PRINIVIL, ZESTRIL) 10 mg tablet TAKE ONE TABLET BY MOUTH ONCE DAILY 90 Tab 0    DULoxetine (CYMBALTA) 30 mg capsule TAKE ONE CAPSULE BY MOUTH AT BEDTIME FOR 7 DAYS , THEN INCREASE TWO CAPSULES BY MOUTH  AT BEDTIME 60 Cap 2    metFORMIN (GLUCOPHAGE) 1,000 mg tablet TAKE ONE TABLET BY MOUTH TWICE DAILY WITH FOOD 180 Tab 0    HYDROcodone-acetaminophen (NORCO) 5-325 mg per tablet Take 1 Tab by mouth two (2) times daily as needed for Pain. Max Daily Amount: 2 Tabs. Indications: chronic pain 42 Tab 0    diazePAM (VALIUM) 5 mg tablet Take 5 mg by mouth every six (6) hours as needed for Anxiety.  ferrous sulfate (IRON) 325 mg (65 mg iron) tablet Take 325 mg by mouth Daily (before breakfast). Indications: Iron Deficiency Anemia      gabapentin (NEURONTIN) 100 mg capsule Take 200 mg by mouth nightly.  naproxen sodium (ALEVE) 220 mg cap Take  by mouth.  alcohol swabs padm Test bs daily. E11.65 300 Pad 3    Blood Glucose Control High&Low (ACCU-CHEK KEVIN CONTROL SOLN) soln Use as directed. E11.65 3 mL 0    Blood-Glucose Meter (ACCU-CHEK KEVIN PLUS METER) misc Check blood glucose twice daily and as needed. E11.49 1 Each 0    glucose blood VI test strips (ACCU-CHEK KEVIN PLUS TEST STRP) strip Check blood glucose twice daily and as needed. E11.49 200 Strip 3    Lancets (ACCU-CHEK SOFTCLIX LANCETS) misc Check blood glucose twice daily and as needed. E11.49 200 Each 3    alendronate (FOSAMAX) 70 mg tablet Take 1 Tab by mouth every seven (7) days. 30 Tab 1    clotrimazole-betamethasone (LOTRISONE) topical cream Apply bid to affected area (pea-sized amount) 15 g 0    clopidogrel (PLAVIX) 75 mg tab Take 1 Tab by mouth daily. (Patient taking differently: Take 150 mg by mouth two (2) times a day.) 90 Tab 3    ipratropium (ATROVENT) 0.02 % nebulizer solution 2.5 mL by Nebulization route every four (4) hours (while awake). Indications: PREVENTION OF BRONCHOSPASM WITH CHRONIC BRONCHITIS 60 mL 1    albuterol (PROVENTIL HFA, VENTOLIN HFA, PROAIR HFA) 90 mcg/actuation inhaler Take 2 Puffs by inhalation every four (4) hours as needed for Wheezing. Indications: Chronic Obstructive Pulmonary Disease 1 Inhaler 1    insulin syringe-needle U-100 Dispense ultrafine 0.5 mL syringes with 31 gauge needle 100 Syringe 11     No current facility-administered medications on file prior to visit.          Allergies   Allergen Reactions    Percocet [Oxycodone-Acetaminophen] Rash and Itching     Can Take Percocet but must take Benadryl for itching     Perfume Ht52 Rash     Perfume specific:  MUSK    Pravastatin Itching     Not sure       Objective:  Visit Vitals    /84    Pulse 87    Temp 98.3 °F (36.8 °C) (Oral)    Resp 17    Ht 5' (1.524 m)    Wt 192 lb (87.1 kg)    SpO2 99%    BMI 37.5 kg/m2      Constitutional: Well developed, nourished, no distress, alert, obese habitus   CV: S1, S2.  RRR. No murmurs/rubs. No thrills palpated. No carotid bruits. Intact distal pulses. No edema. Pulm: No abnormalities on inspection. Clear to auscultation bilaterally. No wheezing/rhonchi. Normal effort. GI: Soft, nontender, nondistended. Normal active bowel sounds.

## 2018-07-11 DIAGNOSIS — M54.16 LEFT LUMBAR RADICULOPATHY: ICD-10-CM

## 2018-07-12 RX ORDER — DULOXETIN HYDROCHLORIDE 30 MG/1
CAPSULE, DELAYED RELEASE ORAL
Qty: 60 CAP | Refills: 2 | Status: SHIPPED | OUTPATIENT
Start: 2018-07-12 | End: 2018-07-25 | Stop reason: ALTCHOICE

## 2018-07-25 ENCOUNTER — OFFICE VISIT (OUTPATIENT)
Dept: FAMILY MEDICINE CLINIC | Age: 68
End: 2018-07-25

## 2018-07-25 VITALS
DIASTOLIC BLOOD PRESSURE: 70 MMHG | OXYGEN SATURATION: 96 % | BODY MASS INDEX: 38.28 KG/M2 | WEIGHT: 195 LBS | TEMPERATURE: 98.1 F | SYSTOLIC BLOOD PRESSURE: 108 MMHG | RESPIRATION RATE: 20 BRPM | HEART RATE: 108 BPM | HEIGHT: 60 IN

## 2018-07-25 DIAGNOSIS — N76.0 ACUTE VAGINITIS: ICD-10-CM

## 2018-07-25 DIAGNOSIS — Z79.4 TYPE 2 DIABETES MELLITUS WITH OTHER NEUROLOGIC COMPLICATION, WITH LONG-TERM CURRENT USE OF INSULIN (HCC): Primary | ICD-10-CM

## 2018-07-25 DIAGNOSIS — E11.49 TYPE 2 DIABETES MELLITUS WITH OTHER NEUROLOGIC COMPLICATION, WITH LONG-TERM CURRENT USE OF INSULIN (HCC): Primary | ICD-10-CM

## 2018-07-25 RX ORDER — INSULIN GLARGINE 100 [IU]/ML
60 INJECTION, SOLUTION SUBCUTANEOUS DAILY
Qty: 5 VIAL | Refills: 0 | Status: SHIPPED | OUTPATIENT
Start: 2018-07-25 | End: 2018-09-13 | Stop reason: SDUPTHER

## 2018-07-25 RX ORDER — INSULIN ASPART 100 [IU]/ML
20 INJECTION, SOLUTION INTRAVENOUS; SUBCUTANEOUS
Qty: 60 ML | Refills: 0 | Status: SHIPPED | OUTPATIENT
Start: 2018-07-25 | End: 2018-10-19 | Stop reason: SDUPTHER

## 2018-07-25 RX ORDER — FLUCONAZOLE 150 MG/1
150 TABLET ORAL DAILY
Qty: 1 TAB | Refills: 0 | Status: SHIPPED | OUTPATIENT
Start: 2018-07-25 | End: 2018-07-26

## 2018-07-25 NOTE — MR AVS SNAPSHOT
[address] [address] [address]  [address] [facility] [id number] [phone] Patient: Rc Valladares MRN: [id number] [id number] Visit Information Date & Time Provider Department Dept. Phone Encounter #  
 7/25/2018  2:45 PM Angelica De, [address] [place] [phone] [id number] Upcoming Health Maintenance Date Due  
 MEDICARE YEARLY EXAM 7/18/2018 Influenza Age 5 to Adult 8/1/2018 EYE EXAM RETINAL OR DILATED Q1 10/5/2018 HEMOGLOBIN A1C Q6M 12/14/2018 FOOT EXAM Q1 2/14/2019 MICROALBUMIN Q1 2/28/2019 LIPID PANEL Q1 2/28/2019 BREAST CANCER SCRN MAMMOGRAM 8/11/2019 GLAUCOMA SCREENING Q2Y 10/5/2019 COLONOSCOPY 10/6/2019 DTaP/Tdap/Td series (2 - Td) 7/26/2026 Allergies as of 7/25/2018  Review Complete On: 7/25/2018 By: Angelica eD MD  
  
 Severity Noted Reaction Type Reactions Percocet [Oxycodone-acetaminophen]  10/08/2010    Rash, Itching Can Take Percocet but must take Benadryl for itching Perfume Ht52  02/05/2015    Rash Perfume specific:  MUSK Pravastatin  09/26/2017    Itching Not sure Current Immunizations  Reviewed on 4/18/2016 Name Date Influenza High Dose Vaccine PF 8/21/2017 11:43 AM, 10/19/2016  9:33 AM  
 Influenza Vaccine 10/19/2015  8:47 AM, 10/17/2013 Influenza Vaccine Whole 12/1/2008 Pneumococcal Polysaccharide (PPSV-23) 4/18/2016  1:50 PM  
 ZZZ-RETIRED (DO NOT USE) Pneumococcal Vaccine (Unspecified Type) 12/1/2008 Not reviewed this visit You Were Diagnosed With   
  
 Codes Comments Type 2 diabetes mellitus with other neurologic complication, with long-term current use of insulin (HCC)    -  Primary ICD-10-CM: E11.49, Z79.4 ICD-9-CM: 250.60, V58.67 Acute vaginitis     ICD-10-CM: N76.0 ICD-9-CM: 616.10 Vitals BP Pulse Temp Resp Height(growth percentile) Weight(growth percentile) 108/70 (BP 1 Location: Right arm, BP Patient Position: Sitting) (!) 108 98.1 °F (36.7 °C) (Oral) 20 5' (1.524 m) 195 lb (88.5 kg) SpO2 BMI OB Status Smoking Status 96% 38.08 kg/m2 Hysterectomy Current Some Day Smoker Vitals History BMI and BSA Data Body Mass Index Body Surface Area 38.08 kg/m 2 1.94 m 2 Preferred Pharmacy Pharmacy Name Phone [address] [facility] [phone] Your Updated Medication List  
  
   
This list is accurate as of 7/25/18  3:11 PM.  Always use your most recent med list.  
  
  
  
  
 albuterol 90 mcg/actuation inhaler Commonly known as:  PROVENTIL HFA, VENTOLIN HFA, PROAIR HFA Take 2 Puffs by inhalation every four (4) hours as needed for Wheezing. Indications: Chronic Obstructive Pulmonary Disease  
  
 alcohol swabs Padm Test bs daily. E11.65  
  
 alendronate 70 mg tablet Commonly known as:  FOSAMAX Take 1 Tab by mouth every seven (7) days. ALEVE 220 mg Cap Generic drug:  naproxen sodium Take  by mouth. azelastine 137 mcg (0.1 %) nasal spray Commonly known as:  ASTELIN  
1 Spray by Both Nostrils route two (2) times a day. Use in each nostril as directed Blood Glucose Control High&Low Soln Commonly known as:  ACCU-CHEK KEVIN CONTROL SOLN  
Use as directed. E11.65 Blood-Glucose Meter Misc Commonly known as:  ACCU-CHEK KEVIN PLUS METER Check blood glucose twice daily and as needed. E11.49  
  
 carvedilol 12.5 mg tablet Commonly known as:  COREG  
TAKE 1 TABLET BY MOUTH TWICE DAILY WITH MEALS  
  
 clopidogrel 75 mg Tab Commonly known as:  PLAVIX Take 1 Tab by mouth daily. clotrimazole-betamethasone topical cream  
Commonly known as:  Pravin Linder Apply bid to affected area (pea-sized amount) diazePAM 5 mg tablet Commonly known as:  VALIUM Take 5 mg by mouth every six (6) hours as needed for Anxiety. fluconazole 150 mg tablet Commonly known as:  DIFLUCAN Take 1 Tab by mouth daily for 1 day. FDA advises cautious prescribing of oral fluconazole in pregnancy. gabapentin 300 mg capsule Commonly known as:  NEURONTIN Take 300 mg by mouth daily. glipiZIDE 10 mg tablet Commonly known as:  GLUCOTROL  
TAKE ONE TABLET BY MOUTH TWICE DAILY  
  
 glucose blood VI test strips strip Commonly known as:  ACCU-CHEK KEVIN PLUS TEST STRP Check blood glucose twice daily and as needed. E11.49 HYDROcodone-acetaminophen 5-325 mg per tablet Commonly known as:  Wayna Glaze Take 1 Tab by mouth two (2) times daily as needed for Pain. Max Daily Amount: 2 Tabs. Indications: chronic pain  
  
 insulin aspart U-100 100 unit/mL injection Commonly known as:  NOVOLOG  
20 Units by SubCUTAneous route Before breakfast, lunch, and dinner. insulin glargine 100 unit/mL injection Commonly known as:  LANTUS  
60 Units by SubCUTAneous route daily. insulin syringe-needle U-100 Dispense ultrafine 0.5 mL syringes with 31 gauge needle  
  
 ipratropium 0.02 % Soln Commonly known as:  ATROVENT  
2.5 mL by Nebulization route every four (4) hours (while awake). Indications: PREVENTION OF BRONCHOSPASM WITH CHRONIC BRONCHITIS Iron 325 mg (65 mg iron) tablet Generic drug:  ferrous sulfate Take 325 mg by mouth Daily (before breakfast). Indications: Iron Deficiency Anemia Liraglutide 0.6 mg/0.1 mL (18 mg/3 mL) Pnij Commonly known as:  VICTOZA  
0.6 mg by SubCUTAneous route daily. lisinopril 10 mg tablet Commonly known as:  PRINIVIL, ZESTRIL  
TAKE ONE TABLET BY MOUTH ONCE DAILY  
  
 metFORMIN 1,000 mg tablet Commonly known as:  GLUCOPHAGE  
TAKE ONE TABLET BY MOUTH TWICE DAILY WITH FOOD  
  
 mometasone 0.1 % lotion Commonly known as:  Singh Keep Apply to scalp. Let sit 15 min before rinsing. Use daily x 1 week. pravastatin 40 mg tablet Commonly known as:  PRAVACHOL  
 Take 1 Tab by mouth daily. promethazine 25 mg tablet Commonly known as:  PHENERGAN Take 1 Tab by mouth every eight (8) hours as needed for Nausea. umeclidinium 62.5 mcg/actuation inhaler Commonly known as:  INCRUSE ELLIPTA Take 1 Puff by inhalation daily. Prescriptions Sent to Pharmacy Refills  
 insulin glargine (LANTUS) 100 unit/mL injection 0 Si Units by SubCUTAneous route daily. Class: Normal  
 Pharmacy: [address] [place] [address] #: [phone] Route: SubCUTAneous  
 insulin aspart U-100 (NOVOLOG) 100 unit/mL injection 0 Si Units by SubCUTAneous route Before breakfast, lunch, and dinner. Class: Normal  
 Pharmacy: [address] [place] [address] #: [phone] Route: SubCUTAneous Liraglutide (VICTOZA) 0.6 mg/0.1 mL (18 mg/3 mL) pnij 0 Si.6 mg by SubCUTAneous route daily. Class: Normal  
 Pharmacy: [address] [place] [address] #: [phone] Route: SubCUTAneous  
 fluconazole (DIFLUCAN) 150 mg tablet 0 Sig: Take 1 Tab by mouth daily for 1 day. FDA advises cautious prescribing of oral fluconazole in pregnancy. Class: Normal  
 Pharmacy: [address] [place] [address] #: [phone] Route: Oral  
  
Introducing [facility] SERVICES! Dear Lazaro Alcala: 
Thank you for requesting a [employer] account. Our records indicate that you have previously registered for a Proxim[employer] account but its currently inactive. Please call our [employer] support line at [phone]. Additional Information If you have questions, please visit the Frequently Asked Questions section of the [employer] website at [email or web address] Remember, [employer] is NOT to be used for urgent needs. For medical emergencies, dial 911. Now available from your iPhone and Android! Please provide this summary of care documentation to your next provider. Your primary care clinician is listed as Norma Emanuel. If you have any questions after today's visit, please call [phone].

## 2018-07-25 NOTE — PROGRESS NOTES
Ebony Damon is a 80 y.o. female (: 1950) presenting to address:    Chief Complaint   Patient presents with    Diabetes       Vitals:    18 1451   BP: 108/70   Pulse: (!) 108   Resp: 20   Temp: 98.1 °F (36.7 °C)   TempSrc: Oral   SpO2: 96%   Weight: 195 lb (88.5 kg)   Height: 5' (1.524 m)   PainSc:   5   PainLoc: Generalized       Learning Assessment:     Learning Assessment 10/22/2015   PRIMARY LEARNER Patient   HIGHEST LEVEL OF EDUCATION - PRIMARY LEARNER  -   BARRIERS PRIMARY LEARNER -   CO-LEARNER CAREGIVER -   PRIMARY LANGUAGE ENGLISH   LEARNER PREFERENCE PRIMARY READING   ANSWERED BY pt   RELATIONSHIP SELF     Depression Screening:     PHQ over the last two weeks 2018   PHQ Not Done Active Diagnosis of Depression or Bipolar Disorder   Little interest or pleasure in doing things -   Feeling down, depressed, irritable, or hopeless -   Total Score PHQ 2 -   Trouble falling or staying asleep, or sleeping too much -   Feeling tired or having little energy -   Poor appetite, weight loss, or overeating -   Feeling bad about yourself - or that you are a failure or have let yourself or your family down -   Trouble concentrating on things such as school, work, reading, or watching TV -   Moving or speaking so slowly that other people could have noticed; or the opposite being so fidgety that others notice -   Thoughts of being better off dead, or hurting yourself in some way -   PHQ 9 Score -   How difficult have these problems made it for you to do your work, take care of your home and get along with others -     Fall Risk Assessment:     Fall Risk Assessment, last 12 mths 2018   Able to walk? Yes   Fall in past 12 months? No   Fall with injury? -   Number of falls in past 12 months -   Fall Risk Score -     Abuse Screening:     Abuse Screening Questionnaire 2018   Do you ever feel afraid of your partner?  N   Are you in a relationship with someone who physically or mentally threatens you? N   Is it safe for you to go home? Y     Coordination of Care Questionaire:   1. Have you been to the ER, urgent care clinic since your last visit? Hospitalized since your last visit? NO    2. Have you seen or consulted any other health care providers outside of the [address] [place] [address] since your last visit? Include any pap smears or colon screening. NO    Advanced Directive:   1. Do you have an Advanced Directive? YES    2. Would you like information on Advanced Directives?  NO

## 2018-07-25 NOTE — PROGRESS NOTES
Assessment/Plan:    1. Type 2 diabetes mellitus with other neurologic complication, with long-term current use of insulin (HCC)  -will change to lantus + novolog. Stop regular insulin. victoza if she can afford, but likely will be too expensive. F/u in 2 weeks with bs log.   - insulin glargine (LANTUS) 100 unit/mL injection; 60 Units by SubCUTAneous route daily. Dispense: 5 Vial; Refill: 0  - insulin aspart U-100 (NOVOLOG) 100 unit/mL injection; 20 Units by SubCUTAneous route Before breakfast, lunch, and dinner. Dispense: 60 mL; Refill: 0  - Liraglutide (VICTOZA) 0.6 mg/0.1 mL (18 mg/3 mL) pnij; 0.6 mg by SubCUTAneous route daily. Dispense: 5 Pen; Refill: 0    The plan was discussed with the patient. The patient verbalized understanding and is in agreement with the plan. All medication potential side effects were discussed with the patient. Health Maintenance:   Health Maintenance   Topic Date Due    MEDICARE YEARLY EXAM  07/18/2018    Influenza Age 5 to Adult  08/01/2018    EYE EXAM RETINAL OR DILATED Q1  10/05/2018    HEMOGLOBIN A1C Q6M  12/14/2018    FOOT EXAM Q1  02/14/2019    MICROALBUMIN Q1  02/28/2019    LIPID PANEL Q1  02/28/2019    BREAST CANCER SCRN MAMMOGRAM  08/11/2019    GLAUCOMA SCREENING Q2Y  10/05/2019    COLONOSCOPY  10/06/2019    DTaP/Tdap/Td series (2 - Td) 07/26/2026    Hepatitis C Screening  Completed    Bone Densitometry (Dexa) Screening  Completed    ZOSTER VACCINE AGE 60>  Addressed    Pneumococcal 65+ Low/Medium Risk  Completed       Diamond Dolan is a 80 y.o. female and presents with Diabetes     Subjective:  DM2 - insulin dose increased to 50 units TID and advised to give 1/2 dose if she eats light breakfast (instead of holding the dose). She says, my sugars are \"lousy\". She didn't bring in her log or meter. Getting 160-220 before breakfast.  No lows. She states she's not holding any doses. Most of the rest of the day, she's running 200-300s.   She's eating coleslaw, chicken pot pie, tomato sandwiches. Eats as late as 11pm. Drinking water. Eats a lot of fruits. HTN - dose coreg increased at last visit. bp good today. Pt c/o vaginal itching and burning. No discharge. No foul-smelling. ROS:  Constitutional: No recent weight change. No weakness/fatigue. No f/c. Cardiovascular: No CP/palpitations. No NINA/orthopnea/PND. Respiratory: No cough/sputum, dyspnea, wheezing. Gastointestinal: No dysphagia, reflux. No n/v. No constipation/diarrhea. No melena/rectal bleeding. The problem list was updated as a part of today's visit. Patient Active Problem List   Diagnosis Code    Vitamin D deficiency E55.9    Hx of breast cancer Z85.3    Chronic pain syndrome G89.4    Osteoarthrosis, unspecified whether generalized or localized, unspecified site N35.40    Diastolic congestive heart failure (HCC) I50.30    COPD (chronic obstructive pulmonary disease) ([facility]) J44.9    GERD (gastroesophageal reflux disease) K21.9    Tobacco dependence F17.200    Chronic radicular lumbar pain M54.16, G89.29    Scoliosis M41.9    Essential hypertension I10    Pure hypercholesterolemia E78.00    CAD (coronary artery disease) I25.10    Spinal stenosis of lumbar region with neurogenic claudication M48.062    S/P lumbar fusion Z98.1    Osteoporosis M81.0    Hemianopsia H53.47    Reactive depression F32.9    Stenosis of left carotid artery I65.22    Carpal tunnel syndrome of right wrist G56.01    Vomiting R11.10    Pain of upper abdomen R10.10    Sacral pain M53.3    Type 2 diabetes mellitus with neurologic complication, with long-term current use of insulin ([facility]) E11.49, Z79.4    History of compression fracture of spine Z87.81    History of stroke Z86.73    Left lumbar radiculopathy M54.16    Sacroiliac joint pain M53.3    Stage 3 chronic kidney disease N18.3    Type 2 diabetes with nephropathy ([facility]) E11.21       The PSH, FH were reviewed.     SH:  Social History   Substance Use Topics    Smoking status: Current Some Day Smoker     Packs/day: 0.50     Years: 35.00     Types: Cigarettes     Last attempt to quit: 2/20/2017    Smokeless tobacco: Never Used    Alcohol use No       Medications/Allergies:  Current Outpatient Prescriptions on File Prior to Visit   Medication Sig Dispense Refill    DULoxetine (CYMBALTA) 30 mg capsule TAKE 1 CAPSULE BY MOUTH ONCE DAILY AT BEDTIME FOR 7 DAYS, THEN INCREASE TO 2 CAPSULES BY MOUTH ONCE DAILY AT BEDTIME THEREAFTER 60 Cap 2    gabapentin (NEURONTIN) 300 mg capsule Take 300 mg by mouth daily.  carvedilol (COREG) 12.5 mg tablet TAKE 1 TABLET BY MOUTH TWICE DAILY WITH MEALS 180 Tab 0    insulin regular (NOVOLIN R REGULAR U-100 INSULN) 100 unit/mL injection INJECT 40 UNITS SUBCUTANEOUSLY THREE TIMES DAILY 100 mL 0    mometasone (ELOCON) 0.1 % lotion Apply to scalp. Let sit 15 min before rinsing. Use daily x 1 week. 60 mL 0    glipiZIDE (GLUCOTROL) 10 mg tablet TAKE ONE TABLET BY MOUTH TWICE DAILY 180 Tab 0    azelastine (ASTELIN) 137 mcg (0.1 %) nasal spray 1 [place] by Both Nostrils route two (2) times a day. Use in each nostril as directed 1 Bottle 3    umeclidinium (INCRUSE ELLIPTA) 62.5 mcg/actuation inhaler Take 1 Puff by inhalation daily. 1 Inhaler 1    promethazine (PHENERGAN) 25 mg tablet Take 1 Tab by mouth every eight (8) hours as needed for Nausea. 20 Tab 0    pravastatin (PRAVACHOL) 40 mg tablet Take 1 Tab by mouth daily. 90 Tab 1    lisinopril (PRINIVIL, ZESTRIL) 10 mg tablet TAKE ONE TABLET BY MOUTH ONCE DAILY 90 Tab 0    metFORMIN (GLUCOPHAGE) 1,000 mg tablet TAKE ONE TABLET BY MOUTH TWICE DAILY WITH FOOD 180 Tab 0    HYDROcodone-acetaminophen (NORCO) 5-325 mg per tablet Take 1 Tab by mouth two (2) times daily as needed for Pain. Max Daily Amount: 2 Tabs. Indications: chronic pain 42 Tab 0    diazePAM (VALIUM) 5 mg tablet Take 5 mg by mouth every six (6) hours as needed for Anxiety.       ferrous sulfate (IRON) 325 mg (65 mg iron) tablet Take 325 mg by mouth Daily (before breakfast). Indications: Iron Deficiency Anemia      naproxen sodium (ALEVE) 220 mg cap Take  by mouth.  alcohol swabs padm Test bs daily. E11.65 300 Pad 3    Blood Glucose Control High&Low (ACCU-CHEK KEVIN CONTROL SOLN) soln Use as directed. E11.65 3 mL 0    Blood-Glucose Meter (ACCU-CHEK KEVIN PLUS METER) misc Check blood glucose twice daily and as needed. E11.49 1 Each 0    glucose blood VI test strips (ACCU-CHEK KEVIN PLUS TEST STRP) strip Check blood glucose twice daily and as needed. E11.49 200 Strip 3    Lancets (ACCU-CHEK SOFTCLIX LANCETS) misc Check blood glucose twice daily and as needed. E11.49 200 Each 3    alendronate (FOSAMAX) 70 mg tablet Take 1 Tab by mouth every seven (7) days. 30 Tab 1    clotrimazole-betamethasone (LOTRISONE) topical cream Apply bid to affected area (pea-sized amount) 15 g 0    clopidogrel (PLAVIX) 75 mg tab Take 1 Tab by mouth daily. (Patient taking differently: Take 150 mg by mouth two (2) times a day.) 90 Tab 3    ipratropium (ATROVENT) 0.02 % nebulizer solution 2.5 mL by Nebulization route every four (4) hours (while awake). Indications: PREVENTION OF BRONCHOSPASM WITH CHRONIC BRONCHITIS 60 mL 1    albuterol (PROVENTIL HFA, VENTOLIN HFA, PROAIR HFA) 90 mcg/actuation inhaler Take 2 Puffs by inhalation every four (4) hours as needed for Wheezing. Indications: Chronic Obstructive Pulmonary Disease 1 Inhaler 1    insulin syringe-needle U-100 Dispense ultrafine 0.5 mL syringes with 31 gauge needle 100 Syringe 11     No current facility-administered medications on file prior to visit.          Allergies   Allergen Reactions    Percocet [Oxycodone-Acetaminophen] Rash and Itching     Can Take Percocet but must take Benadryl for itching     Perfume Ht52 Rash     Perfume specific:  MUSK    Pravastatin Itching     Not sure       Objective:  Visit Vitals    /70 (BP 1 Location: Right arm, BP Patient Position: Sitting)    Pulse (!) 108    Temp 98.1 °F (36.7 °C) (Oral)    Resp 20    Ht 5' (1.524 m)    Wt 195 lb (88.5 kg)    SpO2 96%    BMI 38.08 kg/m2      Constitutional: Well developed, nourished, no distress, alert, obese habitus   CV: S1, S2.  RRR. No murmurs/rubs. No thrills palpated. No carotid bruits. Intact distal pulses. No edema. No aortic bruits. Pulm: No abnormalities on inspection. Clear to auscultation bilaterally. No wheezing/rhonchi. Normal effort.

## 2018-08-08 ENCOUNTER — OFFICE VISIT (OUTPATIENT)
Dept: FAMILY MEDICINE CLINIC | Age: 68
End: 2018-08-08

## 2018-08-08 VITALS
HEIGHT: 60 IN | WEIGHT: 196 LBS | HEART RATE: 76 BPM | BODY MASS INDEX: 38.48 KG/M2 | RESPIRATION RATE: 20 BRPM | SYSTOLIC BLOOD PRESSURE: 124 MMHG | OXYGEN SATURATION: 96 % | DIASTOLIC BLOOD PRESSURE: 82 MMHG | TEMPERATURE: 98.2 F

## 2018-08-08 DIAGNOSIS — Z00.00 MEDICARE ANNUAL WELLNESS VISIT, SUBSEQUENT: ICD-10-CM

## 2018-08-08 DIAGNOSIS — Z79.4 TYPE 2 DIABETES MELLITUS WITH OTHER NEUROLOGIC COMPLICATION, WITH LONG-TERM CURRENT USE OF INSULIN (HCC): Primary | ICD-10-CM

## 2018-08-08 DIAGNOSIS — E11.49 TYPE 2 DIABETES MELLITUS WITH OTHER NEUROLOGIC COMPLICATION, WITH LONG-TERM CURRENT USE OF INSULIN (HCC): Primary | ICD-10-CM

## 2018-08-08 DIAGNOSIS — Z78.9 FULL CODE STATUS: ICD-10-CM

## 2018-08-08 LAB — HBA1C MFR BLD HPLC: 9.5 %

## 2018-08-08 NOTE — MR AVS SNAPSHOT
[address] [facility] 
 
 
 [address] [facility] [id number] [phone] Patient: Sandy Panchal MRN: [id number] MGM:[id number] Visit Information Date & Time Provider Department Dept. Phone Encounter #  
 8/8/2018 11:30 AM Brandon Arnold, 3 [facility] [phone] [id number] Follow-up Instructions Return in about 6 weeks (around 9/19/2018). Follow-up and Disposition History Your Appointments 8/8/2018 11:30 AM  
Follow Up with Brandon Arnold MD  
3 [facility]) Appt Note: 2 week f/u  
 [address] [facility] [id number] [phone]  
  
   
 [address] Upcoming Health Maintenance Date Due Influenza Age 5 to Adult 8/1/2018 EYE EXAM RETINAL OR DILATED Q1 10/5/2018 HEMOGLOBIN A1C Q6M 12/14/2018 FOOT EXAM Q1 2/14/2019 MICROALBUMIN Q1 2/28/2019 LIPID PANEL Q1 2/28/2019 MEDICARE YEARLY EXAM 8/9/2019 BREAST CANCER SCRN MAMMOGRAM 8/11/2019 GLAUCOMA SCREENING Q2Y 10/5/2019 COLONOSCOPY 10/6/2019 DTaP/Tdap/Td series (2 - Td) 7/26/2026 Allergies as of 8/8/2018  Review Complete On: 8/8/2018 By: Brandon Arnold MD  
  
 Severity Noted Reaction Type Reactions Percocet [Oxycodone-acetaminophen]  10/08/2010    Rash, Itching Can Take Percocet but must take Benadryl for itching Perfume Ht52  02/05/2015    Rash Perfume specific:  MUSK Pravastatin  09/26/2017    Itching Not sure Current Immunizations  Reviewed on 4/18/2016 Name Date Influenza High Dose Vaccine PF 8/21/2017 11:43 AM, 10/19/2016  9:33 AM  
 Influenza Vaccine 10/19/2015  8:47 AM, 10/17/2013 Influenza Vaccine Whole 12/1/2008 Pneumococcal Polysaccharide (PPSV-23) 4/18/2016  1:50 PM  
 ZZZ-RETIRED (DO NOT USE) Pneumococcal Vaccine (Unspecified Type) 12/1/2008 Not reviewed this visit You Were Diagnosed With   
  
 Codes Comments Type 2 diabetes mellitus with other neurologic complication, with long-term current use of insulin (HCC)    -  Primary ICD-10-CM: E11.49, Z79.4 ICD-9-CM: 250.60, V58.67 Medicare annual wellness visit, subsequent     ICD-10-CM: Z00.00 ICD-9-CM: V70.0 Full code status     ICD-10-CM: Z78.9 ICD-9-CM: V49.89 Vitals BP Pulse Temp Resp Height(growth percentile) Weight(growth percentile) 124/82 (BP 1 Location: Right arm, BP Patient Position: Sitting) 76 98.2 °F (36.8 °C) (Oral) 20 5' (1.524 m) 196 lb (88.9 kg) SpO2 BMI OB Status Smoking Status 96% 38.28 kg/m2 Hysterectomy Current Some Day Smoker Vitals History BMI and BSA Data Body Mass Index Body Surface Area  
 38.28 kg/m 2 1.94 m 2 Preferred Pharmacy Pharmacy Name Phone [address] [phone] Your Updated Medication List  
  
   
This list is accurate as of 8/8/18 10:51 AM.  Always use your most recent med list.  
  
  
  
  
 albuterol 90 mcg/actuation inhaler Commonly known as:  PROVENTIL HFA, VENTOLIN HFA, PROAIR HFA Take 2 Puffs by inhalation every four (4) hours as needed for Wheezing. Indications: Chronic Obstructive Pulmonary Disease  
  
 alcohol swabs Padm Test bs daily. E11.65  
  
 alendronate 70 mg tablet Commonly known as:  FOSAMAX Take 1 Tab by mouth every seven (7) days. ALEVE 220 mg Cap Generic drug:  naproxen sodium Take  by mouth. azelastine 137 mcg (0.1 %) nasal spray Commonly known as:  ASTELIN  
1 Spray by Both Nostrils route two (2) times a day. Use in each nostril as directed Blood Glucose Control High&Low Soln Commonly known as:  ACCU-CHEK KEVIN CONTROL SOLN  
Use as directed. E11.65 Blood-Glucose Meter Misc Commonly known as:  ACCU-CHEK KEVIN PLUS METER  
 Check blood glucose twice daily and as needed. E11.49  
  
 carvedilol 12.5 mg tablet Commonly known as:  COREG  
TAKE 1 TABLET BY MOUTH TWICE DAILY WITH MEALS  
  
 clopidogrel 75 mg Tab Commonly known as:  PLAVIX Take 1 Tab by mouth daily. clotrimazole-betamethasone topical cream  
Commonly known as:  Benetta Euler Apply bid to affected area (pea-sized amount) diazePAM 5 mg tablet Commonly known as:  VALIUM Take 5 mg by mouth every six (6) hours as needed for Anxiety. gabapentin 300 mg capsule Commonly known as:  NEURONTIN Take 300 mg by mouth daily. glipiZIDE 10 mg tablet Commonly known as:  GLUCOTROL  
TAKE ONE TABLET BY MOUTH TWICE DAILY  
  
 glucose blood VI test strips strip Commonly known as:  ACCU-CHEK KEVIN PLUS TEST STRP Check blood glucose twice daily and as needed. E11.49 HYDROcodone-acetaminophen 5-325 mg per tablet Commonly known as:  Iven Mesa Take 1 Tab by mouth two (2) times daily as needed for Pain. Max Daily Amount: 2 Tabs. Indications: chronic pain  
  
 insulin aspart U-100 100 unit/mL injection Commonly known as:  NOVOLOG  
20 Units by SubCUTAneous route Before breakfast, lunch, and dinner. insulin glargine 100 unit/mL injection Commonly known as:  LANTUS  
60 Units by SubCUTAneous route daily. insulin syringe-needle U-100 Dispense ultrafine 0.5 mL syringes with 31 gauge needle  
  
 ipratropium 0.02 % Soln Commonly known as:  ATROVENT  
2.5 mL by Nebulization route every four (4) hours (while awake). Indications: PREVENTION OF BRONCHOSPASM WITH CHRONIC BRONCHITIS Iron 325 mg (65 mg iron) tablet Generic drug:  ferrous sulfate Take 325 mg by mouth Daily (before breakfast). Indications: Iron Deficiency Anemia Liraglutide 0.6 mg/0.1 mL (18 mg/3 mL) Pnij Commonly known as:  VICTOZA  
0.6 mg by SubCUTAneous route daily. lisinopril 10 mg tablet Commonly known as:  Michelle Hughes  
 TAKE ONE TABLET BY MOUTH ONCE DAILY  
  
 metFORMIN 1,000 mg tablet Commonly known as:  GLUCOPHAGE  
TAKE ONE TABLET BY MOUTH TWICE DAILY WITH FOOD  
  
 mometasone 0.1 % lotion Commonly known as:  Etha Im Apply to scalp. Let sit 15 min before rinsing. Use daily x 1 week. pravastatin 40 mg tablet Commonly known as:  PRAVACHOL Take 1 Tab by mouth daily. promethazine 25 mg tablet Commonly known as:  PHENERGAN Take 1 Tab by mouth every eight (8) hours as needed for Nausea. umeclidinium 62.5 mcg/actuation inhaler Commonly known as:  INCRUSE ELLIPTA Take 1 Puff by inhalation daily. We Performed the Following AMB POC HEMOGLOBIN A1C [66543 CPT(R)] FULL CODE [COD2 Custom] Follow-up Instructions Return in about 6 weeks (around 9/19/2018). Patient Instructions Medicare Wellness Visit, Female The best way to live healthy is to have a lifestyle where you eat a well-balanced diet, exercise regularly, limit alcohol use, and quit all forms of tobacco/nicotine, if applicable. Regular preventive services are another way to keep healthy. Preventive services (vaccines, screening tests, monitoring & exams) can help personalize your care plan, which helps you manage your own care. Screening tests can find health problems at the earliest stages, when they are easiest to treat. [facility] follows the current, evidence-based guidelines published by the [facility] States Yung Anish (USPSTF) when recommending preventive services for our patients. Because we follow these guidelines, sometimes recommendations change over time as research supports it. (For example, mammograms used to be recommended annually. Even though Medicare will still pay for an annual mammogram, the newer guidelines recommend a mammogram every two years for women of average risk.) Of course, you and your provider may decide to screen more often for some diseases, based on your risk and co-morbidities (chronic disease you are already diagnosed with). Preventive services for you include: - Medicare offers their members a free annual wellness visit, which is time for you and your primary care provider to discuss and plan for your preventive service needs. Take advantage of this benefit every year! 
 
-All people over age 72 should receive the recommended pneumonia vaccines. Current USPSTF guidelines recommend a series of two vaccines for the best pneumonia protection.  
 
-All adults should have a yearly flu vaccine and a tetanus vaccine every 10 years. All adults age 61 years should receive a shingles vaccine once in their lifetime.   
 
-A bone mass density test is recommended when a woman turns 65 to screen for osteoporosis. This test is only recommended once as a screening. Some providers will use this same test as a disease monitoring tool if you already have osteoporosis. -All adults age 38-68 years who are overweight should have a diabetes screening test once every three years.  
 
-Other screening tests & preventive services for persons with diabetes include: an eye exam to screen for diabetic retinopathy, a kidney function test, a foot exam, and stricter control over your cholesterol.  
 
-Cardiovascular screening for adults with routine risk involves an electrocardiogram (ECG) at intervals determined by the provider.  
 
-Colorectal cancer screenings should be done for adults age 54-65 years with normal risk. There are a number of acceptable methods of screening for this type of cancer. Each test has its own benefits and drawbacks. Discuss with your provider what is most appropriate for you during your annual wellness visit. The different tests include: colonoscopy (considered the best screening method), a fecal occult blood test, a fecal DNA test, and sigmoidoscopy. -Breast cancer screenings are recommended every other year for women of normal risk age 54-69 years.  
 
-Cervical cancer screenings for women over age 72 are only recommended with certain risk factors.  
 
-All adults born between [facility] should be screened once for Hepatitis C. Here is a list of your current Health Maintenance items (your personalized list of preventive services) with a due date: 
Health Maintenance Due Topic Date Due  
 Annual Well Visit  07/18/2018  Flu Vaccine  08/01/2018 Introducing [facility]! Dear Bryce Nava: 
Thank you for requesting a [employer] account. Our records indicate that you have previously registered for a cFares account but its currently inactive. Please call our cFares support line at [phone]. Additional Information If you have questions, please visit the Frequently Asked Questions section of the cFares website at [email or web address] Remember, cFares is NOT to be used for urgent needs. For medical emergencies, dial 911. Now available from your iPhone and Android! Please provide this summary of care documentation to your next provider. Your primary care clinician is listed as Norma Emanuel. If you have any questions after today's visit, please call [phone].
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
## 2018-08-08 NOTE — PROGRESS NOTES
Kian Fuentes is a 80 y.o. female (: 1950) presenting to address:    Chief Complaint   Patient presents with    Diabetes    Back Pain     pain scale 10       Vitals:    18 1019   BP: 124/82   Pulse: 76   Resp: 20   Temp: 98.2 °F (36.8 °C)   TempSrc: Oral   SpO2: 96%   Weight: 196 lb (88.9 kg)   Height: 5' (1.524 m)   PainSc:   4   PainLoc: Back       Hearing/Vision:   No exam data present    Learning Assessment:     Learning Assessment 10/22/2015   PRIMARY LEARNER Patient   HIGHEST LEVEL OF EDUCATION - PRIMARY LEARNER  -   BARRIERS PRIMARY LEARNER -   CO-LEARNER CAREGIVER -   PRIMARY LANGUAGE ENGLISH   LEARNER PREFERENCE PRIMARY READING   ANSWERED BY pt   RELATIONSHIP SELF     Depression Screening:     PHQ over the last two weeks 2018   PHQ Not Done Active Diagnosis of Depression or Bipolar Disorder   Little interest or pleasure in doing things -   Feeling down, depressed, irritable, or hopeless -   Total Score PHQ 2 -   Trouble falling or staying asleep, or sleeping too much -   Feeling tired or having little energy -   Poor appetite, weight loss, or overeating -   Feeling bad about yourself - or that you are a failure or have let yourself or your family down -   Trouble concentrating on things such as school, work, reading, or watching TV -   Moving or speaking so slowly that other people could have noticed; or the opposite being so fidgety that others notice -   Thoughts of being better off dead, or hurting yourself in some way -   PHQ 9 Score -   How difficult have these problems made it for you to do your work, take care of your home and get along with others -     Fall Risk Assessment:     Fall Risk Assessment, last 12 mths 2018   Able to walk? Yes   Fall in past 12 months? No   Fall with injury? -   Number of falls in past 12 months -   Fall Risk Score -     Abuse Screening:     Abuse Screening Questionnaire 2018   Do you ever feel afraid of your partner?  N   Are you in a relationship with someone who physically or mentally threatens you? N   Is it safe for you to go home? Y     Coordination of Care Questionaire:   1. Have you been to the ER, urgent care clinic since your last visit? Hospitalized since your last visit? NO    2. Have you seen or consulted any other health care providers outside of the [address] [place] [address] since your last visit? Include any pap smears or colon screening. NO    Advanced Directive:   1. Do you have an Advanced Directive? YES    2. Would you like information on Advanced Directives?  NO

## 2018-08-08 NOTE — PATIENT INSTRUCTIONS
Medicare Wellness Visit, Female    The best way to live healthy is to have a lifestyle where you eat a well-balanced diet, exercise regularly, limit alcohol use, and quit all forms of tobacco/nicotine, if applicable. Regular preventive services are another way to keep healthy. Preventive services (vaccines, screening tests, monitoring & exams) can help personalize your care plan, which helps you manage your own care. Screening tests can find health problems at the earliest stages, when they are easiest to treat. [facility] follows the current, evidence-based guidelines published by the [facility] ([facility]) when recommending preventive services for our patients. Because we follow these guidelines, sometimes recommendations change over time as research supports it. (For example, mammograms used to be recommended annually. Even though Medicare will still pay for an annual mammogram, the newer guidelines recommend a mammogram every two years for women of average risk.)    Of course, you and your provider may decide to screen more often for some diseases, based on your risk and co-morbidities (chronic disease you are already diagnosed with). Preventive services for you include:    - Medicare offers their members a free annual wellness visit, which is time for you and your primary care provider to discuss and plan for your preventive service needs. Take advantage of this benefit every year!    -All people over age 72 should receive the recommended pneumonia vaccines. Current USPSTF guidelines recommend a series of two vaccines for the best pneumonia protection.     -All adults should have a yearly flu vaccine and a tetanus vaccine every 10 years. All adults age 61 years should receive a shingles vaccine once in their lifetime.      -A bone mass density test is recommended when a woman turns 65 to screen for osteoporosis.  This test is only recommended once as a screening. Some providers will use this same test as a disease monitoring tool if you already have osteoporosis. -All adults age 38-68 years who are overweight should have a diabetes screening test once every three years.     -Other screening tests & preventive services for persons with diabetes include: an eye exam to screen for diabetic retinopathy, a kidney function test, a foot exam, and stricter control over your cholesterol.     -Cardiovascular screening for adults with routine risk involves an electrocardiogram (ECG) at intervals determined by the provider.     -Colorectal cancer screenings should be done for adults age 54-65 years with normal risk. There are a number of acceptable methods of screening for this type of cancer. Each test has its own benefits and drawbacks. Discuss with your provider what is most appropriate for you during your annual wellness visit. The different tests include: colonoscopy (considered the best screening method), a fecal occult blood test, a fecal DNA test, and sigmoidoscopy. -Breast cancer screenings are recommended every other year for women of normal risk age 54-69 years.     -Cervical cancer screenings for women over age 72 are only recommended with certain risk factors.     -All adults born between [facility] should be screened once for Hepatitis C.      Here is a list of your current Health Maintenance items (your personalized list of preventive services) with a due date:  Health Maintenance Due   Topic Date Due    Annual Well Visit  07/18/2018    Flu Vaccine  08/01/2018

## 2018-08-08 NOTE — ACP (ADVANCE CARE PLANNING)
Advance Care Planning (ACP) Provider Conversation Snapshot    Date of ACP Conversation: 08/08/18  Persons included in Conversation:  patient  Length of ACP Conversation in minutes:  <16 minutes (Non-Billable)    Authorized Decision Maker (if patient is incapable of making informed decisions):    This person is:   Healthcare Agent/Medical Power of  under Advance Directive          For Patients with Decision Making Capacity:   Values/Goals: Exploration of values, goals, and preferences if recovery is not expected, even with continued medical treatment in the event of:  Imminent death  pt is full code  a    Conversation Outcomes / Follow-Up Plan:   Recommended completion of Advance Directive form after review of ACP materials and conversation with prospective healthcare agent

## 2018-08-13 DIAGNOSIS — I10 ESSENTIAL HYPERTENSION: ICD-10-CM

## 2018-08-13 DIAGNOSIS — I25.10 CORONARY ARTERY DISEASE INVOLVING NATIVE HEART WITHOUT ANGINA PECTORIS, UNSPECIFIED VESSEL OR LESION TYPE: ICD-10-CM

## 2018-08-14 RX ORDER — LISINOPRIL 10 MG/1
TABLET ORAL
Qty: 90 TAB | Refills: 0 | Status: SHIPPED | OUTPATIENT
Start: 2018-08-14 | End: 2018-09-05 | Stop reason: SDUPTHER

## 2018-09-05 ENCOUNTER — DOCUMENTATION ONLY (OUTPATIENT)
Dept: FAMILY MEDICINE CLINIC | Age: 68
End: 2018-09-05

## 2018-09-05 DIAGNOSIS — I25.10 CORONARY ARTERY DISEASE INVOLVING NATIVE HEART WITHOUT ANGINA PECTORIS, UNSPECIFIED VESSEL OR LESION TYPE: ICD-10-CM

## 2018-09-05 DIAGNOSIS — I10 ESSENTIAL HYPERTENSION: ICD-10-CM

## 2018-09-05 NOTE — PROGRESS NOTES
Patients  called he thinks his wife had a mild stroke a week ago didn't notice at the time didn't go to the ER but has noticed that her speech has been slurred and is concerned strongly advised emergency room for evaluation but  said patient will not go and would like a sooner appointment for evaluation .  I discussed with Dr Percival Brittle who also strongly encouraged the emergency room for evaluation  notified by again said patient will not go dr Percival Brittle can see patient tomorrow at 7:45 am she would like patient to take 81 mg aspirin  voiced understanding

## 2018-09-06 ENCOUNTER — OFFICE VISIT (OUTPATIENT)
Dept: FAMILY MEDICINE CLINIC | Age: 68
End: 2018-09-06

## 2018-09-06 VITALS
HEART RATE: 87 BPM | BODY MASS INDEX: 37.3 KG/M2 | DIASTOLIC BLOOD PRESSURE: 70 MMHG | HEIGHT: 60 IN | TEMPERATURE: 98.7 F | SYSTOLIC BLOOD PRESSURE: 100 MMHG | WEIGHT: 190 LBS | OXYGEN SATURATION: 97 % | RESPIRATION RATE: 20 BRPM

## 2018-09-06 DIAGNOSIS — E11.49 TYPE 2 DIABETES MELLITUS WITH OTHER NEUROLOGIC COMPLICATION, WITH LONG-TERM CURRENT USE OF INSULIN (HCC): ICD-10-CM

## 2018-09-06 DIAGNOSIS — T17.308A CHOKING, INITIAL ENCOUNTER: ICD-10-CM

## 2018-09-06 DIAGNOSIS — I10 ESSENTIAL HYPERTENSION: ICD-10-CM

## 2018-09-06 DIAGNOSIS — R47.1 DYSARTHRIA: ICD-10-CM

## 2018-09-06 DIAGNOSIS — I63.9 CEREBROVASCULAR ACCIDENT (CVA), UNSPECIFIED MECHANISM (HCC): Primary | ICD-10-CM

## 2018-09-06 DIAGNOSIS — Z79.4 TYPE 2 DIABETES MELLITUS WITH OTHER NEUROLOGIC COMPLICATION, WITH LONG-TERM CURRENT USE OF INSULIN (HCC): ICD-10-CM

## 2018-09-06 DIAGNOSIS — E78.00 PURE HYPERCHOLESTEROLEMIA: ICD-10-CM

## 2018-09-06 RX ORDER — PRAVASTATIN SODIUM 40 MG/1
TABLET ORAL
Qty: 90 TAB | Refills: 1 | Status: SHIPPED | OUTPATIENT
Start: 2018-09-06 | End: 2018-09-13 | Stop reason: ALTCHOICE

## 2018-09-06 RX ORDER — CARVEDILOL 12.5 MG/1
TABLET ORAL
Qty: 180 TAB | Refills: 0 | Status: SHIPPED | OUTPATIENT
Start: 2018-09-06 | End: 2019-01-15 | Stop reason: SDUPTHER

## 2018-09-06 RX ORDER — LISINOPRIL 10 MG/1
TABLET ORAL
Qty: 90 TAB | Refills: 0 | Status: SHIPPED | OUTPATIENT
Start: 2018-09-06 | End: 2019-01-15 | Stop reason: SDUPTHER

## 2018-09-06 NOTE — MR AVS SNAPSHOT
[address] [facility] 
 
 
 [address] [facility] [address] 
[phone] Patient: Kylee Quiroga MRN: [id number] ETV:8/6/[id number] Visit Information Date & Time Provider Department Dept. Phone Encounter #  
 9/6/2018  7:45 AM Puneet Fraire, 3 [facility] [phone] [id number] Your Appointments 9/19/2018 11:30 AM  
Follow Up with Puneet Fraire MD  
[address] [address] [facility]) Appt Note: 6 week f/u 8/8/18 AM  
 [address] [facility] [id number] [phone]  
  
   
 [address] Upcoming Health Maintenance Date Due Influenza Age 5 to Adult 8/1/2018 EYE EXAM RETINAL OR DILATED Q1 10/5/2018 HEMOGLOBIN A1C Q6M 2/8/2019 FOOT EXAM Q1 2/14/2019 MICROALBUMIN Q1 2/28/2019 LIPID PANEL Q1 2/28/2019 MEDICARE YEARLY EXAM 8/9/2019 BREAST CANCER SCRN MAMMOGRAM 8/11/2019 GLAUCOMA SCREENING Q2Y 10/5/2019 COLONOSCOPY 10/6/2019 DTaP/Tdap/Td series (2 - Td) 7/26/2026 Allergies as of 9/6/2018  Review Complete On: 9/6/2018 By: Puneet Fraire MD  
  
 Severity Noted Reaction Type Reactions Percocet [Oxycodone-acetaminophen]  10/08/2010    Rash, Itching Can Take Percocet but must take Benadryl for itching Perfume Ht52  02/05/2015    Rash Perfume specific:  MUSK Pravastatin  09/26/2017    Itching Not sure Current Immunizations  Reviewed on 4/18/2016 Name Date Influenza High Dose Vaccine PF 8/21/2017 11:43 AM, 10/19/2016  9:33 AM  
 Influenza Vaccine 10/19/2015  8:47 AM, 10/17/2013 Influenza Vaccine Whole 12/1/2008 Pneumococcal Polysaccharide (PPSV-23) 4/18/2016  1:50 PM  
 ZZZ-RETIRED (DO NOT USE) Pneumococcal Vaccine (Unspecified Type) 12/1/2008 Not reviewed this visit You Were Diagnosed With   
  
 Codes Comments Cerebrovascular accident (CVA), unspecified mechanism ([facility] [address]    -  Primary ICD-10-CM: I63.9 ICD-9-CM: 434.91 Dysarthria     ICD-10-CM: R47.1 ICD-9-CM: 784.51 Choking, initial encounter     ICD-10-CM: T17.308A ICD-9-CM: 933.1 Essential hypertension     ICD-10-CM: I10 
ICD-9-CM: 401.9 Pure hypercholesterolemia     ICD-10-CM: E78.00 ICD-9-CM: 272.0 Type 2 diabetes mellitus with other neurologic complication, with long-term current use of insulin (HCC)     ICD-10-CM: E11.49, Z79.4 ICD-9-CM: 250.60, V58.67 Vitals BP Pulse Temp Resp Height(growth percentile) Weight(growth percentile) 100/70 (BP 1 Location: Right arm, BP Patient Position: Sitting) 87 98.7 °F (37.1 °C) (Oral) 20 5' (1.524 m) 190 lb (86.2 kg) SpO2 BMI OB Status Smoking Status 97% 37.11 kg/m2 Hysterectomy Current Some Day Smoker Vitals History BMI and BSA Data Body Mass Index Body Surface Area  
 37.11 kg/m 2 1.91 m 2 Preferred Pharmacy Pharmacy Name Phone [address] [phone] Your Updated Medication List  
  
   
This list is accurate as of 9/6/18  8:37 AM.  Always use your most recent med list.  
  
  
  
  
 albuterol 90 mcg/actuation inhaler Commonly known as:  PROVENTIL HFA, VENTOLIN HFA, PROAIR HFA Take 2 Puffs by inhalation every four (4) hours as needed for Wheezing. Indications: Chronic Obstructive Pulmonary Disease  
  
 alcohol swabs Padm Test bs daily. E11.65  
  
 alendronate 70 mg tablet Commonly known as:  FOSAMAX Take 1 Tab by mouth every seven (7) days. ALEVE 220 mg Cap Generic drug:  naproxen sodium Take  by mouth. azelastine 137 mcg (0.1 %) nasal spray Commonly known as:  ASTELIN  
1 Spray by Both Nostrils route two (2) times a day. Use in each nostril as directed Blood Glucose Control High&Low Soln Commonly known as:  ACCU-CHEK KEVIN CONTROL SOLN  
 Use as directed. E11.65 Blood-Glucose Meter Misc Commonly known as:  ACCU-CHEK KEVIN PLUS METER Check blood glucose twice daily and as needed. E11.49  
  
 carvedilol 12.5 mg tablet Commonly known as:  COREG  
TAKE 1 TABLET BY MOUTH TWICE DAILY WITH MEALS  
  
 clopidogrel 75 mg Tab Commonly known as:  PLAVIX Take 1 Tab by mouth daily. clotrimazole-betamethasone topical cream  
Commonly known as:  Delpha Stains Apply bid to affected area (pea-sized amount) diazePAM 5 mg tablet Commonly known as:  VALIUM Take 5 mg by mouth every six (6) hours as needed for Anxiety. gabapentin 300 mg capsule Commonly known as:  NEURONTIN Take 300 mg by mouth daily. glipiZIDE 10 mg tablet Commonly known as:  GLUCOTROL  
TAKE ONE TABLET BY MOUTH TWICE DAILY  
  
 glucose blood VI test strips strip Commonly known as:  ACCU-CHEK KEVIN PLUS TEST STRP Check blood glucose twice daily and as needed. E11.49 HYDROcodone-acetaminophen 5-325 mg per tablet Commonly known as:  Wayna Glaze Take 1 Tab by mouth two (2) times daily as needed for Pain. Max Daily Amount: 2 Tabs. Indications: chronic pain  
  
 insulin aspart U-100 100 unit/mL injection Commonly known as:  NOVOLOG  
20 Units by SubCUTAneous route Before breakfast, lunch, and dinner. insulin glargine 100 unit/mL injection Commonly known as:  LANTUS  
60 Units by SubCUTAneous route daily. insulin syringe-needle U-100 Dispense ultrafine 0.5 mL syringes with 31 gauge needle  
  
 ipratropium 0.02 % Soln Commonly known as:  ATROVENT  
2.5 mL by Nebulization route every four (4) hours (while awake). Indications: PREVENTION OF BRONCHOSPASM WITH CHRONIC BRONCHITIS Iron 325 mg (65 mg iron) tablet Generic drug:  ferrous sulfate Take 325 mg by mouth Daily (before breakfast). Indications: Iron Deficiency Anemia Liraglutide 0.6 mg/0.1 mL (18 mg/3 mL) Bibi Commonly known as:  Arnaldo Webb 0.6 mg by SubCUTAneous route daily. lisinopril 10 mg tablet Commonly known as:  PRINIVIL, ZESTRIL  
TAKE 1 TABLET BY MOUTH ONCE DAILY  
  
 metFORMIN 1,000 mg tablet Commonly known as:  GLUCOPHAGE  
TAKE ONE TABLET BY MOUTH TWICE DAILY WITH FOOD  
  
 mometasone 0.1 % lotion Commonly known as:  Lacho Bailer Apply to scalp. Let sit 15 min before rinsing. Use daily x 1 week. pravastatin 40 mg tablet Commonly known as:  PRAVACHOL Take 1 Tab by mouth daily. promethazine 25 mg tablet Commonly known as:  PHENERGAN Take 1 Tab by mouth every eight (8) hours as needed for Nausea. umeclidinium 62.5 mcg/actuation inhaler Commonly known as:  INCRUSE ELLIPTA Take 1 Puff by inhalation daily. Introducing [facility]! Dear Doug Lara: 
Thank you for requesting a [employer] account. Our records indicate that you have previously registered for a [employer] account but its currently inactive. Please call our [employer] support line at [phone]. Additional Information If you have questions, please visit the Frequently Asked Questions section of the [employer] website at [email or web address] Remember, [employer] is NOT to be used for urgent needs. For medical emergencies, dial 911. Now available from your iPhone and Android! Please provide this summary of care documentation to your next provider. Your primary care clinician is listed as Norma Emanuel. If you have any questions after today's visit, please call [phone].

## 2018-09-06 NOTE — PROGRESS NOTES
Aleksandra Matias is a 80 y.o. female (: 1950) presenting to address: Chief Complaint Patient presents with  Dysarthria Vitals:  
 18 0751 BP: 100/70 Pulse: 87 Resp: 20 Temp: 98.7 °F (37.1 °C) TempSrc: Oral  
SpO2: 97% Weight: 190 lb (86.2 kg) Height: 5' (1.524 m) PainSc:   0 - No pain Learning Assessment:  
 
Learning Assessment 10/22/2015 PRIMARY LEARNER Patient HIGHEST LEVEL OF EDUCATION - PRIMARY LEARNER  -  
BARRIERS PRIMARY LEARNER -  
CO-LEARNER CAREGIVER -  
PRIMARY LANGUAGE ENGLISH  
LEARNER PREFERENCE PRIMARY READING  
ANSWERED BY pt  
RELATIONSHIP SELF Depression Screening: PHQ over the last two weeks 2018 PHQ Not Done Active Diagnosis of Depression or Bipolar Disorder Little interest or pleasure in doing things - Feeling down, depressed, irritable, or hopeless - Total Score PHQ 2 - Trouble falling or staying asleep, or sleeping too much - Feeling tired or having little energy - Poor appetite, weight loss, or overeating - Feeling bad about yourself - or that you are a failure or have let yourself or your family down - Trouble concentrating on things such as school, work, reading, or watching TV - Moving or speaking so slowly that other people could have noticed; or the opposite being so fidgety that others notice - Thoughts of being better off dead, or hurting yourself in some way -  
PHQ 9 Score - How difficult have these problems made it for you to do your work, take care of your home and get along with others - Fall Risk Assessment:  
 
Fall Risk Assessment, last 12 mths 2018 Able to walk? Yes Fall in past 12 months? No  
Fall with injury? -  
Number of falls in past 12 months - Fall Risk Score -  
 
Abuse Screening:  
 
Abuse Screening Questionnaire 2018 Do you ever feel afraid of your partner? Huma Liu Are you in a relationship with someone who physically or mentally threatens you?  Huma Liu  
 Is it safe for you to go home? Emma Cortez Coordination of Care Questionaire: 1. Have you been to the ER, urgent care clinic since your last visit? Hospitalized since your last visit? NO 
 
2. Have you seen or consulted any other health care providers outside of the [address] [place] [address] since your last visit? Include any pap smears or colon screening. NO Advanced Directive: 1. Do you have an Advanced Directive? YES 
 
2. Would you like information on Advanced Directives?  NO

## 2018-09-06 NOTE — PROGRESS NOTES
Assessment/Plan: 1. Cerebrovascular accident (CVA), unspecified mechanism ([address] 
- discussed pt needs urgent eval in ER/hospital.  Pt initially reluctant, however, I have convinced her and  for need for expedited eval (especially for speech therapy). She likely needs change from asa to plavix if ischemic stroke confirmed. Needs urgent eval including speech eval for dysphagia. 2. Dysarthria and choking 3. Essential hypertension 
-h/o well controlled 4. Pure hypercholesterolemia 
-on statin 5. Type 2 diabetes mellitus with other neurologic complication, with long-term current use of insulin (HCC) -uncontrolled The plan was discussed with the patient. The patient verbalized understanding and is in agreement with the plan. All medication potential side effects were discussed with the patient. Health Maintenance:  
Health Maintenance Topic Date Due  Influenza Age 5 to Adult  08/01/2018  
 EYE EXAM RETINAL OR DILATED Q1  10/05/2018  HEMOGLOBIN A1C Q6M  02/08/2019  
 FOOT EXAM Q1  02/14/2019  MICROALBUMIN Q1  02/28/2019  LIPID PANEL Q1  02/28/2019  MEDICARE YEARLY EXAM  08/09/2019  BREAST CANCER SCRN MAMMOGRAM  08/11/2019  GLAUCOMA SCREENING Q2Y  10/05/2019  COLONOSCOPY  10/06/2019  
 DTaP/Tdap/Td series (2 - Td) 07/26/2026  Hepatitis C Screening  Completed  Bone Densitometry (Dexa) Screening  Completed  ZOSTER VACCINE AGE 60>  Addressed  Pneumococcal 65+ Low/Medium Risk  Completed Ember Fraire is a 80 y.o. female and presents with No chief complaint on file. Subjective: 
 reports history. States last week, pt started having difficulty speaking  (talking slower, difficulty understanding what  was saying, no dysarthria) and \"patient was different\". They didn't think anything of it and thought it was \"a heat stroke\". They didn't seek medical attention.   Sx have progressed over the past week to involve slurred speech. Pt denies weakness or numbness. Pt states she feels a little confused, but can understand what her  and I are talking about. Pt states she has difficulty finding the right words.  does report coughing and choking with drinking water since this has happened. Had been taking ASA 81 mg prior to this event for previous stroke (L PCA stroke 2/2017, prior R parieto-occipital stroke) - has residual homonymous hemianopsia on R side. She has uncontrolled DM, most recent A1c 9.5. Does have HTN, but bp is usually controlled. ROS: 
Constitutional: No recent weight change. No weakness/fatigue. No f/c. HENT: No HA, dizziness. No hearing loss/tinnitus. No nasal congestion/discharge. Eyes: No change in vision, double/blurred vision or eye pain/redness. Cardiovascular: No CP/palpitations. No NINA/orthopnea/PND. Respiratory: No cough/sputum, dyspnea, wheezing. Neurological: No seizures/numbness/weakness. No paresthesias. The problem list was updated as a part of today's visit. Patient Active Problem List  
Diagnosis Code  Vitamin D deficiency E55.9  
 Hx of breast cancer Z85.3  Chronic pain syndrome G89.4  Osteoarthrosis, unspecified whether generalized or localized, unspecified site C49.64  
 Diastolic congestive heart failure (HCC) I50.30  
 COPD (chronic obstructive pulmonary disease) (HCC) J44.9  GERD (gastroesophageal reflux disease) K21.9  Tobacco dependence F17.200  Chronic radicular lumbar pain M54.16, G89.29  Scoliosis M41.9  Essential hypertension I10  Pure hypercholesterolemia E78.00  CAD (coronary artery disease) I25.10  Spinal stenosis of lumbar region with neurogenic claudication M48.062  
 S/P lumbar fusion Z98.1  Osteoporosis M81.0  Hemianopsia H53.47  Reactive depression F32.9  Stenosis of left carotid artery I65.22  
 Carpal tunnel syndrome of right wrist G56.01  
 Vomiting R11.10  Pain of upper abdomen R10.10  Sacral pain M53.3  Type 2 diabetes mellitus with neurologic complication, with long-term current use of insulin (HCC) E11.49, Z79.4  History of compression fracture of spine Z87.81  
 History of stroke Z86.73  Left lumbar radiculopathy M54.16  
 Sacroiliac joint pain M53.3  Stage 3 chronic kidney disease N18.3  Type 2 diabetes with nephropathy (HCC) E11.21  
 Full code status Z78.9 The PSH, FH were reviewed. SH: Social History Substance Use Topics  Smoking status: Current Some Day Smoker Packs/day: 0.50 Years: 35.00 Types: Cigarettes Last attempt to quit: 2/20/2017  Smokeless tobacco: Never Used  Alcohol use No  
 
 
Medications/Allergies: 
Current Outpatient Prescriptions on File Prior to Visit Medication Sig Dispense Refill  lisinopril (PRINIVIL, ZESTRIL) 10 mg tablet TAKE 1 TABLET BY MOUTH ONCE DAILY 90 Tab 0  
 insulin glargine (LANTUS) 100 unit/mL injection 60 Units by SubCUTAneous route daily. 5 Vial 0  
 insulin aspart U-100 (NOVOLOG) 100 unit/mL injection 20 Units by SubCUTAneous route Before breakfast, lunch, and dinner. 60 mL 0  
 Liraglutide (VICTOZA) 0.6 mg/0.1 mL (18 mg/3 mL) pnij 0.6 mg by SubCUTAneous route daily. 5 Pen 0  
 gabapentin (NEURONTIN) 300 mg capsule Take 300 mg by mouth daily.  carvedilol (COREG) 12.5 mg tablet TAKE 1 TABLET BY MOUTH TWICE DAILY WITH MEALS 180 Tab 0  
 mometasone (ELOCON) 0.1 % lotion Apply to scalp. Let sit 15 min before rinsing. Use daily x 1 week. 60 mL 0  
 glipiZIDE (GLUCOTROL) 10 mg tablet TAKE ONE TABLET BY MOUTH TWICE DAILY 180 Tab 0  
 azelastine (ASTELIN) 137 mcg (0.1 %) nasal spray 1 [place] by Both Nostrils route two (2) times a day. Use in each nostril as directed 1 Bottle 3  
 umeclidinium (INCRUSE ELLIPTA) 62.5 mcg/actuation inhaler Take 1 Puff by inhalation daily. 1 Inhaler 1  promethazine (PHENERGAN) 25 mg tablet Take 1 Tab by mouth every eight (8) hours as needed for Nausea. 20 Tab 0  pravastatin (PRAVACHOL) 40 mg tablet Take 1 Tab by mouth daily. 90 Tab 1  
 metFORMIN (GLUCOPHAGE) 1,000 mg tablet TAKE ONE TABLET BY MOUTH TWICE DAILY WITH FOOD 180 Tab 0  
 HYDROcodone-acetaminophen (NORCO) 5-325 mg per tablet Take 1 Tab by mouth two (2) times daily as needed for Pain. Max Daily Amount: 2 Tabs. Indications: chronic pain 42 Tab 0  
 diazePAM (VALIUM) 5 mg tablet Take 5 mg by mouth every six (6) hours as needed for Anxiety.  ferrous sulfate (IRON) 325 mg (65 mg iron) tablet Take 325 mg by mouth Daily (before breakfast). Indications: Iron Deficiency Anemia  naproxen sodium (ALEVE) 220 mg cap Take  by mouth.  alcohol swabs padm Test bs daily. E11.65 300 Pad 3  Blood Glucose Control High&Low (ACCU-CHEK KEVIN CONTROL SOLN) soln Use as directed. E11.65 3 mL 0  Blood-Glucose Meter (ACCU-CHEK KEVIN PLUS METER) misc Check blood glucose twice daily and as needed. E11.49 1 Each 0  
 glucose blood VI test strips (ACCU-CHEK KEVIN PLUS TEST STRP) strip Check blood glucose twice daily and as needed. E11.49 200 Strip 3  
 alendronate (FOSAMAX) 70 mg tablet Take 1 Tab by mouth every seven (7) days. 30 Tab 1  clotrimazole-betamethasone (LOTRISONE) topical cream Apply bid to affected area (pea-sized amount) 15 g 0  
 clopidogrel (PLAVIX) 75 mg tab Take 1 Tab by mouth daily. (Patient taking differently: Take 150 mg by mouth two (2) times a day.) 90 Tab 3  
 ipratropium (ATROVENT) 0.02 % nebulizer solution 2.5 mL by Nebulization route every four (4) hours (while awake). Indications: PREVENTION OF BRONCHOSPASM WITH CHRONIC BRONCHITIS 60 mL 1  
 albuterol (PROVENTIL HFA, VENTOLIN HFA, PROAIR HFA) 90 mcg/actuation inhaler Take 2 Puffs by inhalation every four (4) hours as needed for Wheezing.  Indications: Chronic Obstructive Pulmonary Disease 1 Inhaler 1  
 insulin syringe-needle U-100 Dispense ultrafine 0.5 mL syringes with 31 gauge needle 100 Syringe 11 No current facility-administered medications on file prior to visit. Allergies Allergen Reactions  Percocet [Oxycodone-Acetaminophen] Rash and Itching Can Take Percocet but must take Benadryl for itching  Perfume Ht52 Rash Perfume specific:  MUSK  Pravastatin Itching Not sure Objective: 
Visit Vitals  /70 (BP 1 Location: Right arm, BP Patient Position: Sitting)  Pulse 87  Temp 98.7 °F (37.1 °C) (Oral)  Resp 20  
 Ht 5' (1.524 m)  Wt 190 lb (86.2 kg)  SpO2 97%  BMI 37.11 kg/m2 Constitutional: Well developed, nourished, no distress, alert, obese habitus HENT: Exterior ears and tympanic membranes normal bilaterally. Supple neck. No thyromegaly or lymphadenopathy. Oropharynx clear and moist mucous membranes. Normal inferior turbinates. Septum midline. Eyes: Conjunctiva normal. PERRL. Eyelids normal.  
CV: S1, S2.  RRR. No murmurs/rubs. No thrills palpated. No carotid bruits. Intact distal pulses. No edema. No aortic bruits. Pulm: No abnormalities on inspection. Clear to auscultation bilaterally. No wheezing/rhonchi. Normal effort. GI: Soft, nontender, nondistended. Normal active bowel sounds. MS: Gait slow and stooped over. Neuro: A/O x 2(not to time). No focal motor or sensory deficits. (unable to reliably detect sensation b/c pt stated she couldn't tell difference) Speech slurred and dysarthric. +R patella hyperreflexic. R facial flattening. Psych: Appropriate affect, judgement and insight. Labwork and Ancillary Studies: CBC w/Diff Lab Results Component Value Date/Time WBC 7.1 03/06/2018 12:21 PM  
 HGB 11.4 (L) 03/06/2018 12:21 PM  
 PLATELET 650 25/66/0277 12:21 PM  
  
 
 Basic Metabolic Profile/LFTs Lab Results Component Value Date/Time  Sodium 135 (L) 03/06/2018 12:21 PM  
 Potassium 4.5 03/06/2018 12:21 PM  
 Chloride 103 03/06/2018 12:21 PM  
 CO2 24 03/06/2018 12:21 PM  
 Anion gap 8 03/06/2018 12:21 PM  
 Glucose 182 (H) 03/06/2018 12:21 PM  
 BUN 25 (H) 03/06/2018 12:21 PM  
 Creatinine 1.10 03/06/2018 12:21 PM  
 BUN/Creatinine ratio 23 (H) 03/06/2018 12:21 PM  
 GFR est AA >60 03/06/2018 12:21 PM  
 GFR est non-AA 50 (L) 03/06/2018 12:21 PM  
 Calcium 9.3 03/06/2018 12:21 PM  
  
Lab Results Component Value Date/Time ALT (SGPT) 23 03/06/2018 12:21 PM  
 AST (SGOT) 12 (L) 03/06/2018 12:21 PM  
 Alk. phosphatase 97 03/06/2018 12:21 PM  
 Bilirubin, direct <0.1 04/28/2016 03:42 AM  
 Bilirubin, total 0.2 03/06/2018 12:21 PM  
 
 
Cholesterol Lab Results Component Value Date/Time  Cholesterol, total 169 02/28/2018 12:00 AM  
 HDL Cholesterol 42 02/28/2018 12:00 AM  
 LDL, calculated 86 02/28/2018 12:00 AM  
 Triglyceride 207 (H) 02/28/2018 12:00 AM  
 CHOL/HDL Ratio 3.7 02/19/2017 05:01 AM

## 2018-09-10 ENCOUNTER — TELEPHONE (OUTPATIENT)
Dept: FAMILY MEDICINE CLINIC | Age: 68
End: 2018-09-10

## 2018-09-10 ENCOUNTER — PATIENT OUTREACH (OUTPATIENT)
Dept: FAMILY MEDICINE CLINIC | Age: 68
End: 2018-09-10

## 2018-09-10 PROBLEM — R73.9 HYPERGLYCEMIA: Status: ACTIVE | Noted: 2018-09-06

## 2018-09-10 RX ORDER — DICYCLOMINE HYDROCHLORIDE 10 MG/1
20 CAPSULE ORAL
COMMUNITY
End: 2018-10-19 | Stop reason: SDUPTHER

## 2018-09-10 RX ORDER — DULOXETIN HYDROCHLORIDE 30 MG/1
30 CAPSULE, DELAYED RELEASE ORAL 2 TIMES DAILY
COMMUNITY
Start: 2018-09-02 | End: 2019-01-15 | Stop reason: SDUPTHER

## 2018-09-10 RX ORDER — ASPIRIN 81 MG/1
81 TABLET ORAL DAILY
COMMUNITY
End: 2018-09-13 | Stop reason: SDUPTHER

## 2018-09-10 RX ORDER — IPRATROPIUM BROMIDE AND ALBUTEROL SULFATE 2.5; .5 MG/3ML; MG/3ML
3 SOLUTION RESPIRATORY (INHALATION)
COMMUNITY
End: 2018-09-13 | Stop reason: ALTCHOICE

## 2018-09-10 NOTE — Clinical Note
FYI: Admitted 9/6-8/2018 CVA They treated her for residual Stroke symptoms Patient was there only 2 days including the day that you sent her. She is still very dysarthric. Need assistance with ADL's, continue to ambulate with her walker. They did not change or add any medications. NO therapies order, but were recommended.  D/C summary was done by YENNY blank MD signed offShe [facility] 9/13/2018 9:45am

## 2018-09-10 NOTE — PROGRESS NOTES
[facility] Discharge Follow-Up Date/Time:  9/10/2018 3:41 PM 
 
Patient was admitted to [facility], [place]  on 2018 and discharged on 2018 for CVA. The physician discharge summary was available at the time of outreach. Patient was contacted within 1 business days of discharge. Patient lives in [place]. APRIL ÁLVAREZ [facility] called to see if they go that far if not will try other [address]. Patient is very dysarthric , She herself called to get some kind of Speech therapy help earlier. Patient is a very high risk for readmission Top Challenges reviewed with the provider  
 >> Patient needs [facility] services ( SLP and OT)<< 
The hospital did not order any HH ??? 
Speech is very garble dysarthric, but understandable. Ambulating with walker needs assistance with ADLs Nurse to monitor medications Method of communication with provider :chart routing Inpatient RRAT score: . High Risk 29 Total Score 3 Has Seen PCP in Last 6 Months (Yes=3, No=0) 31 Charlson Comorbidity Score (Age + Comorbid Conditions) Criteria that do not apply:  
 . Living with Significant Other. Assisted Living. LTAC. SNF. or  
Rehab Patient Length of Stay (>5 days = 3) IP Visits Last 12 Months (1-3=4, 4=9, >4=11) Pt. Coverage (Medicare=5 , Medicaid, or Self-Pay=4) Was this a readmission? no  
Patient stated reason for the readmission: n/a Nurse Navigator (NN) contacted the patient by telephone to perform post hospital discharge assessment. Verified name and  with patient as identifiers. Provided introduction to self, and explanation of the Nurse Navigator role. Reviewed discharge instructions and red flags with patient who verbalized understanding. Patient given an opportunity to ask questions and does not have any further questions or concerns at this time.  The patient agrees to contact the PCP office for questions related to their healthcare. NN provided contact information for future reference. Disease Specific:   N/A Summary of patient's top problems: 1. CVA affecting Speech 2. [facility] for medication management, ADL's, PT, OT and SLP 3. Home Health orders at discharge: Is in Need Home Health company:  
Date of initial visit:  
 
Durable Medical Equipment ordered/company: Has own from previous CVA Durable Medical Equipment received:N/A Barriers to care? Speech as of now, Need SLP Advance Care Planning:  
Does patient have an Advance Directive:  not on file Medication(s):  
New Medications at Discharge: None Changed Medications at Discharge: None Discontinued Medications at Discharge: None Medication reconciliation was performed with patient, who verbalizes understanding of administration of home medications. There were no barriers to obtaining medications identified at this time. Referral to Pharm D needed: no  
 
Current Outpatient Prescriptions Medication Sig  
 aspirin delayed-release 81 mg tablet Take 81 mg by mouth daily.  dicyclomine (BENTYL) 10 mg capsule Take 20 mg by mouth four (4) times daily as needed.  DULoxetine (CYMBALTA) 30 mg capsule Take 30 mg by mouth two (2) times a day.  albuterol-ipratropium (DUO-NEB) 2.5 mg-0.5 mg/3 ml nebu 3 mL by Nebulization route four (4) times daily as needed.  lisinopril (PRINIVIL, ZESTRIL) 10 mg tablet TAKE 1 TABLET BY MOUTH ONCE DAILY  pravastatin (PRAVACHOL) 40 mg tablet TAKE 1 TABLET BY MOUTH ONCE DAILY  carvedilol (COREG) 12.5 mg tablet TAKE 1 TABLET BY MOUTH TWICE DAILY WITH MEALS  insulin glargine (LANTUS) 100 unit/mL injection 60 Units by SubCUTAneous route daily.  insulin aspart U-100 (NOVOLOG) 100 unit/mL injection 20 Units by SubCUTAneous route Before breakfast, lunch, and dinner.   
 Liraglutide (VICTOZA) 0.6 mg/0.1 mL (18 mg/3 mL) pnij 0.6 mg by SubCUTAneous route daily.  gabapentin (NEURONTIN) 300 mg capsule Take 300 mg by mouth daily.  mometasone (ELOCON) 0.1 % lotion Apply to scalp. Let sit 15 min before rinsing. Use daily x 1 week.  glipiZIDE (GLUCOTROL) 10 mg tablet TAKE ONE TABLET BY MOUTH TWICE DAILY  azelastine (ASTELIN) 137 mcg (0.1 %) nasal spray 1 [place] by Both Nostrils route two (2) times a day. Use in each nostril as directed  umeclidinium (INCRUSE ELLIPTA) 62.5 mcg/actuation inhaler Take 1 Puff by inhalation daily.  promethazine (PHENERGAN) 25 mg tablet Take 1 Tab by mouth every eight (8) hours as needed for Nausea.  metFORMIN (GLUCOPHAGE) 1,000 mg tablet TAKE ONE TABLET BY MOUTH TWICE DAILY WITH FOOD  
 HYDROcodone-acetaminophen (NORCO) 5-325 mg per tablet Take 1 Tab by mouth two (2) times daily as needed for Pain. Max Daily Amount: 2 Tabs. Indications: chronic pain  diazePAM (VALIUM) 5 mg tablet Take 5 mg by mouth every six (6) hours as needed for Anxiety.  ferrous sulfate (IRON) 325 mg (65 mg iron) tablet Take 325 mg by mouth Daily (before breakfast). Indications: Iron Deficiency Anemia  naproxen sodium (ALEVE) 220 mg cap Take  by mouth.  alcohol swabs padm Test bs daily. E11.65  Blood Glucose Control High&Low (ACCU-CHEK KEVIN CONTROL SOLN) soln Use as directed. E11.65  Blood-Glucose Meter (ACCU-CHEK KEVIN PLUS METER) misc Check blood glucose twice daily and as needed. E11.49  
 glucose blood VI test strips (ACCU-CHEK KEVIN PLUS TEST STRP) strip Check blood glucose twice daily and as needed. E11.49  
 alendronate (FOSAMAX) 70 mg tablet Take 1 Tab by mouth every seven (7) days.  clotrimazole-betamethasone (LOTRISONE) topical cream Apply bid to affected area (pea-sized amount)  clopidogrel (PLAVIX) 75 mg tab Take 1 Tab by mouth daily. (Patient taking differently: Take 150 mg by mouth two (2) times a day.)  ipratropium (ATROVENT) 0.02 % nebulizer solution 2.5 mL by Nebulization route every four (4) hours (while awake). Indications: PREVENTION OF BRONCHOSPASM WITH CHRONIC BRONCHITIS  albuterol (PROVENTIL HFA, VENTOLIN HFA, PROAIR HFA) 90 mcg/actuation inhaler Take 2 Puffs by inhalation every four (4) hours as needed for Wheezing. Indications: Chronic Obstructive Pulmonary Disease  insulin syringe-needle U-100 Dispense ultrafine 0.5 mL syringes with 31 gauge needle No current facility-administered medications for this visit. There are no discontinued medications. [facility] follow up appointment(s): Future Appointments Date Time Provider Mp Oleary 9/13/2018 9:45 AM MD LAUREN Pina  
9/19/2018 11:30 AM Marie Berger MD [facility] follow up appointment(s): N/A [facility]:  n/a  
 
 
Goals Post Hospitalization  Knowledge and adherence of prescribed medication (ie. action, side effects, missed dose, etc.).  Prevent complications post hospitalization.  Returns to baseline activity level.  Understands red flags post discharge. FAST Reviewed with patient

## 2018-09-13 ENCOUNTER — OFFICE VISIT (OUTPATIENT)
Dept: FAMILY MEDICINE CLINIC | Age: 68
End: 2018-09-13

## 2018-09-13 VITALS
BODY MASS INDEX: 37.5 KG/M2 | HEART RATE: 77 BPM | WEIGHT: 191 LBS | HEIGHT: 60 IN | DIASTOLIC BLOOD PRESSURE: 86 MMHG | TEMPERATURE: 98.4 F | SYSTOLIC BLOOD PRESSURE: 120 MMHG | RESPIRATION RATE: 20 BRPM | OXYGEN SATURATION: 93 %

## 2018-09-13 DIAGNOSIS — B35.3 TINEA PEDIS OF BOTH FEET: ICD-10-CM

## 2018-09-13 DIAGNOSIS — I63.432 CEREBROVASCULAR ACCIDENT (CVA) DUE TO EMBOLISM OF LEFT POSTERIOR CEREBRAL ARTERY (HCC): Primary | ICD-10-CM

## 2018-09-13 DIAGNOSIS — R47.1 DYSARTHRIA: ICD-10-CM

## 2018-09-13 DIAGNOSIS — E11.49 TYPE 2 DIABETES MELLITUS WITH OTHER NEUROLOGIC COMPLICATION, WITH LONG-TERM CURRENT USE OF INSULIN (HCC): ICD-10-CM

## 2018-09-13 DIAGNOSIS — I10 ESSENTIAL HYPERTENSION: ICD-10-CM

## 2018-09-13 DIAGNOSIS — Z79.4 TYPE 2 DIABETES MELLITUS WITH OTHER NEUROLOGIC COMPLICATION, WITH LONG-TERM CURRENT USE OF INSULIN (HCC): ICD-10-CM

## 2018-09-13 PROBLEM — R11.10 VOMITING: Status: RESOLVED | Noted: 2017-04-24 | Resolved: 2018-09-13

## 2018-09-13 PROBLEM — E11.21 TYPE 2 DIABETES WITH NEPHROPATHY (HCC): Status: RESOLVED | Noted: 2018-02-20 | Resolved: 2018-09-13

## 2018-09-13 PROBLEM — R10.10 PAIN OF UPPER ABDOMEN: Status: RESOLVED | Noted: 2017-04-24 | Resolved: 2018-09-13

## 2018-09-13 PROBLEM — R73.9 HYPERGLYCEMIA: Status: RESOLVED | Noted: 2018-09-06 | Resolved: 2018-09-13

## 2018-09-13 PROBLEM — I63.9 CVA (CEREBRAL VASCULAR ACCIDENT) (HCC): Status: RESOLVED | Noted: 2017-02-18 | Resolved: 2018-09-13

## 2018-09-13 RX ORDER — ATORVASTATIN CALCIUM 40 MG/1
40 TABLET, FILM COATED ORAL DAILY
Qty: 90 TAB | Refills: 3 | Status: SHIPPED | OUTPATIENT
Start: 2018-09-13 | End: 2019-09-16 | Stop reason: SDUPTHER

## 2018-09-13 RX ORDER — ASPIRIN 325 MG
325 TABLET, DELAYED RELEASE (ENTERIC COATED) ORAL DAILY
COMMUNITY
Start: 2018-09-13 | End: 2019-04-15

## 2018-09-13 RX ORDER — CHLORPHENIRAMINE MALEATE 4 MG
TABLET ORAL 2 TIMES DAILY
Qty: 45 G | Refills: 0 | Status: SHIPPED | OUTPATIENT
Start: 2018-09-13 | End: 2019-03-14 | Stop reason: ALTCHOICE

## 2018-09-13 RX ORDER — ASPIRIN 81 MG/1
325 TABLET ORAL DAILY
COMMUNITY
Start: 2018-09-13 | End: 2018-09-13 | Stop reason: SDUPTHER

## 2018-09-13 RX ORDER — INSULIN GLARGINE 100 [IU]/ML
70 INJECTION, SOLUTION SUBCUTANEOUS DAILY
Qty: 10 VIAL | Refills: 0 | Status: SHIPPED | OUTPATIENT
Start: 2018-09-13 | End: 2018-10-19 | Stop reason: SDUPTHER

## 2018-09-13 NOTE — MR AVS SNAPSHOT
Soniya Flores 
 
 
 [address] [facility] [id number] [phone] Patient: Kian Fuentes MRN: [id number] OIJ:[birth year] Visit Information Date & Time Provider Department Dept. Phone Encounter #  
 9/13/2018  9:45 AM Marie Berger, 3 [facility] [phone] [id number] Your Appointments 9/19/2018 11:30 AM  
Follow Up with Marie Berger MD  
[facility] Appt Note: 6 week f/u 8/8/18 AM  
 [address] [facility] [id number] [phone]  
  
   
 [address] Upcoming Health Maintenance Date Due Influenza Age 5 to Adult 8/1/2018 EYE EXAM RETINAL OR DILATED Q1 10/5/2018 HEMOGLOBIN A1C Q6M 2/8/2019 FOOT EXAM Q1 2/14/2019 MICROALBUMIN Q1 2/28/2019 LIPID PANEL Q1 2/28/2019 MEDICARE YEARLY EXAM 8/9/2019 BREAST CANCER SCRN MAMMOGRAM 8/11/2019 GLAUCOMA SCREENING Q2Y 10/5/2019 COLONOSCOPY 10/6/2019 DTaP/Tdap/Td series (2 - Td) 7/26/2026 Allergies as of 9/13/2018  Review Complete On: 9/13/2018 By: Solis Conner Severity Noted Reaction Type Reactions Percocet [Oxycodone-acetaminophen]  10/08/2010    Rash, Itching Can Take Percocet but must take Benadryl for itching Perfume Ht52  02/05/2015    Rash Perfume specific:  MUSK Pravastatin  09/26/2017    Itching Not sure Current Immunizations  Reviewed on 4/18/2016 Name Date Influenza High Dose Vaccine PF 8/21/2017 11:43 AM, 10/19/2016  9:33 AM  
 Influenza Vaccine 10/19/2015  8:47 AM, 10/17/2013 Influenza Vaccine Whole 12/1/2008 Pneumococcal Polysaccharide (PPSV-23) 4/18/2016  1:50 PM  
 ZZZ-RETIRED (DO NOT USE) Pneumococcal Vaccine (Unspecified Type) 12/1/2008 Not reviewed this visit You Were Diagnosed With   
  
 Codes Comments Cerebrovascular accident (CVA) due to embolism of left posterior cerebral artery ([facility] [address]    -  Primary ICD-10-CM: G79.975 ICD-9-CM: 434.11 Type 2 diabetes mellitus with other neurologic complication, with long-term current use of insulin (HCC)     ICD-10-CM: E11.49, Z79.4 ICD-9-CM: 250.60, V58.67 Essential hypertension     ICD-10-CM: I10 
ICD-9-CM: 401.9 Dysarthria     ICD-10-CM: R47.1 ICD-9-CM: 784.51 Tinea pedis of both feet     ICD-10-CM: B35.3 ICD-9-CM: 110.4 Vitals BP Pulse Temp Resp Height(growth percentile) Weight(growth percentile) 120/86 (BP 1 Location: Right arm, BP Patient Position: Sitting) 77 98.4 °F (36.9 °C) (Oral) 20 5' (1.524 m) 191 lb (86.6 kg) SpO2 BMI OB Status Smoking Status 93% 37.3 kg/m2 Hysterectomy Current Some Day Smoker Vitals History BMI and BSA Data Body Mass Index Body Surface Area  
 37.3 kg/m 2 1.91 m 2 Preferred Pharmacy Pharmacy Name Phone [address] [phone] Your Updated Medication List  
  
   
This list is accurate as of 9/13/18 10:06 AM.  Always use your most recent med list.  
  
  
  
  
 albuterol 90 mcg/actuation inhaler Commonly known as:  PROVENTIL HFA, VENTOLIN HFA, PROAIR HFA Take 2 Puffs by inhalation every four (4) hours as needed for Wheezing. Indications: Chronic Obstructive Pulmonary Disease  
  
 alcohol swabs Padm Test bs daily. E11.65  
  
 alendronate 70 mg tablet Commonly known as:  FOSAMAX Take 1 Tab by mouth every seven (7) days. aspirin delayed-release 325 mg tablet Take 1 Tab by mouth daily. atorvastatin 40 mg tablet Commonly known as:  LIPITOR Take 1 Tab by mouth daily. Blood Glucose Control High&Low Soln Commonly known as:  ACCU-CHEK KEVIN CONTROL SOLN  
Use as directed. E11.65 Blood-Glucose Meter Misc Commonly known as:  ACCU-CHEK KEVIN PLUS METER  
 Check blood glucose twice daily and as needed. E11.49  
  
 carvedilol 12.5 mg tablet Commonly known as:  COREG  
TAKE 1 TABLET BY MOUTH TWICE DAILY WITH MEALS  
  
 clopidogrel 75 mg Tab Commonly known as:  PLAVIX Take 1 Tab by mouth daily. clotrimazole 1 % topical cream  
Commonly known as:  Laurel Damon Apply  to affected area two (2) times a day. clotrimazole-betamethasone topical cream  
Commonly known as:  Maddy Reynolds Apply bid to affected area (pea-sized amount) diazePAM 5 mg tablet Commonly known as:  VALIUM Take 5 mg by mouth every six (6) hours as needed for Anxiety. dicyclomine 10 mg capsule Commonly known as:  BENTYL Take 20 mg by mouth four (4) times daily as needed. DULoxetine 30 mg capsule Commonly known as:  CYMBALTA Take 30 mg by mouth two (2) times a day.  
  
 gabapentin 300 mg capsule Commonly known as:  NEURONTIN Take 300 mg by mouth daily. glipiZIDE 10 mg tablet Commonly known as:  GLUCOTROL  
TAKE ONE TABLET BY MOUTH TWICE DAILY  
  
 glucose blood VI test strips strip Commonly known as:  ACCU-CHEK KEVIN PLUS TEST STRP Check blood glucose twice daily and as needed. E11.49 HYDROcodone-acetaminophen 5-325 mg per tablet Commonly known as:  [address] Take 1 Tab by mouth two (2) times daily as needed for Pain. Max Daily Amount: 2 Tabs. Indications: chronic pain  
  
 insulin aspart U-100 100 unit/mL injection Commonly known as:  NOVOLOG  
20 Units by SubCUTAneous route Before breakfast, lunch, and dinner. insulin glargine 100 unit/mL injection Commonly known as:  LANTUS  
70 Units by SubCUTAneous route daily. insulin syringe-needle U-100 Dispense ultrafine 0.5 mL syringes with 31 gauge needle  
  
 ipratropium 0.02 % Soln Commonly known as:  ATROVENT  
2.5 mL by Nebulization route every four (4) hours (while awake). Indications: PREVENTION OF BRONCHOSPASM WITH CHRONIC BRONCHITIS Iron 325 mg (65 mg iron) tablet Generic drug:  ferrous sulfate Take 325 mg by mouth Daily (before breakfast). Indications: Iron Deficiency Anemia Liraglutide 0.6 mg/0.1 mL (18 mg/3 mL) Pnij Commonly known as:  VICTOZA  
1.8 mg by SubCUTAneous route daily. lisinopril 10 mg tablet Commonly known as:  PRINIVIL, ZESTRIL  
TAKE 1 TABLET BY MOUTH ONCE DAILY  
  
 metFORMIN 1,000 mg tablet Commonly known as:  GLUCOPHAGE  
TAKE ONE TABLET BY MOUTH TWICE DAILY WITH FOOD  
  
 mometasone 0.1 % lotion Commonly known as:  [place] Amas Apply to scalp. Let sit 15 min before rinsing. Use daily x 1 week. promethazine 25 mg tablet Commonly known as:  PHENERGAN Take 1 Tab by mouth every eight (8) hours as needed for Nausea. umeclidinium 62.5 mcg/actuation inhaler Commonly known as:  INCRUSE ELLIPTA Take 1 Puff by inhalation daily. Prescriptions Sent to Pharmacy Refills  
 atorvastatin (LIPITOR) 40 mg tablet 3 Sig: Take 1 Tab by mouth daily. Class: Normal  
 Pharmacy: [address] [place] [address] #: [phone] Route: Oral  
 insulin glargine (LANTUS) 100 unit/mL injection 0 Si Units by SubCUTAneous route daily. Class: Normal  
 Pharmacy: [address] [place] [address] #: [phone] Route: SubCUTAneous Liraglutide (VICTOZA) 0.6 mg/0.1 mL (18 mg/3 mL) pnij 0 Si.8 mg by SubCUTAneous route daily. Class: Normal  
 Pharmacy: [address] [place] [address] #: [phone] Route: SubCUTAneous  
 clotrimazole (LOTRIMIN) 1 % topical cream 0 Sig: Apply  to affected area two (2) times a day. Class: Normal  
 Pharmacy: [address] [place] [address] #: [phone] Route: Topical  
  
We Performed the Following REFERRAL TO CARDIOLOGY [ONU79 Custom] REFERRAL TO SPEECH THERAPY [QGK740 Custom] Comments:  
 montserratvalencia john Referral Information Referral ID Referred By Referred To  
  
 [id number] [facility], Bethany Ta MD   
   [facility] [address] Phone: [phone] Fax: [phone] Visits Status Start Date End Date 1 New Request 9/13/18 9/13/19 If your referral has a status of pending review or denied, additional information will be sent to support the outcome of this decision. Referral ID Referred By Referred To  
 [id number] Johnny Johnson V Not Available Visits Status Start Date End Date 1 New Request 9/13/18 9/13/19 If your referral has a status of pending review or denied, additional information will be sent to support the outcome of this decision. Introducing [facility]! Dear Jennifer Snow: 
Thank you for requesting a Bad[employer] account. Our records indicate that you have previously registered for a Badoo account but its currently inactive. Please call our Badoo support line at [phone]. Additional Information If you have questions, please visit the Frequently Asked Questions section of the Badoo website at [email or web address] Remember, Badoo is NOT to be used for urgent needs. For medical emergencies, dial 911. Now available from your iPhone and Android! Please provide this summary of care documentation to your next provider. Your primary care clinician is listed as Norma Emanuel. If you have any questions after today's visit, please call [phone].

## 2018-09-13 NOTE — PROGRESS NOTES
Assessment/Plan: 1. Cerebrovascular accident (CVA) due to embolism of left posterior cerebral artery ([address] -needs prolonged monitoring x 30d for PAF, and NIMO to r/o thrombus/source of embolism. Referral speech therapy. Work on smoking cessation. bp controlled. Need to get diabetes controlled. [facility] 
- REFERRAL TO SPEECH THERAPY 2. Type 2 diabetes mellitus with other neurologic complication, with long-term current use of insulin (HCC) 
-incr dose to 70 units lantus, incr victoza to 1.8mg. Call with bs in 2 weeks. F/u in 1mo 
- insulin glargine (LANTUS) 100 unit/mL injection; 70 Units by SubCUTAneous route daily. Dispense: 10 Vial; Refill: 0 
- Liraglutide (VICTOZA) 0.6 mg/0.1 mL (18 mg/3 mL) pnij; 1.8 mg by SubCUTAneous route daily. Dispense: 10 Pen; Refill: 0 
 
3. Essential hypertension 
-cont current 4. Dysarthria 
- REFERRAL TO SPEECH THERAPY 5. Tinea pedis of both feet 
- clotrimazole (LOTRIMIN) 1 % topical cream; Apply  to affected area two (2) times a day. Dispense: 45 g; Refill: 0 The plan was discussed with the patient. The patient verbalized understanding and is in agreement with the plan. All medication potential side effects were discussed with the patient. Health Maintenance:  
Health Maintenance Topic Date Due  Influenza Age 5 to Adult  08/01/2018  
 EYE EXAM RETINAL OR DILATED Q1  10/05/2018  HEMOGLOBIN A1C Q6M  02/08/2019  
 FOOT EXAM Q1  02/14/2019  MICROALBUMIN Q1  02/28/2019  LIPID PANEL Q1  02/28/2019  MEDICARE YEARLY EXAM  08/09/2019  BREAST CANCER SCRN MAMMOGRAM  08/11/2019  GLAUCOMA SCREENING Q2Y  10/05/2019  COLONOSCOPY  10/06/2019  
 DTaP/Tdap/Td series (2 - Td) 07/26/2026  Hepatitis C Screening  Completed  Bone Densitometry (Dexa) Screening  Completed  ZOSTER VACCINE AGE 60>  Addressed  Pneumococcal 65+ Low/Medium Risk  Completed Larisa Laura is a 80 y.o. female and presents with No chief complaint on file. Subjective: 
Ms. Destiny Saldana is a 80y.o. year old female, she is seen today for Transition of Care services following a hospital discharge for acute stroke on 9/8/18. Our office Nurse Navigator performed an outreach to Ms. Demetri Malave on 9/10/18 (within 2 business days of discharge) to complete medication reconciliation and a telephonic assessment of her condition. No significant changes were made to her regimen, except asa 81mg incr to 325mg and statin was changed to lipitor. Echo was neg for PFO. No significant occlusion on MRA. No significant carotid stenosis. No eval for embolic phenomenon. She had bedside swallow, which showed mild dysphagia, but soft diet was recommended.  reports less difficulty eating. DM2 - uncontrolled. States her bs are 'improved'. Fasting running 270s. ROS: 
Constitutional: No recent weight change. No weakness/fatigue. No f/c. HENT: No HA, dizziness. No hearing loss/tinnitus. No nasal congestion/discharge. Eyes: No change in vision, double/blurred vision or eye pain/redness. Cardiovascular: No CP/palpitations. No NINA/orthopnea/PND. Respiratory: No cough/sputum, dyspnea, wheezing. Gastointestinal: No dysphagia, reflux. No n/v. No constipation/diarrhea. No melena/rectal bleeding. Genitourinary: No dysuria, urinary hesitancy, nocturia, hematuria. No incontinence. The problem list was updated as a part of today's visit. Patient Active Problem List  
Diagnosis Code  Vitamin D deficiency E55.9  
 Hx of breast cancer Z85.3  Chronic pain syndrome G89.4  Osteoarthrosis, unspecified whether generalized or localized, unspecified site L19.58  
 Diastolic congestive heart failure (HCC) I50.30  
 COPD (chronic obstructive pulmonary disease) (HCC) J44.9  GERD (gastroesophageal reflux disease) K21.9  Tobacco dependence F17.200  Chronic radicular lumbar pain M54.16, G89.29  Scoliosis M41.9  Essential hypertension I10  Pure hypercholesterolemia E78.00  CAD (coronary artery disease) I25.10  Spinal stenosis of lumbar region with neurogenic claudication M48.062  
 S/P lumbar fusion Z98.1  Osteoporosis M81.0  Hemianopsia H53.47  
 CVA (cerebral vascular accident) ([address] I63.9  Reactive depression F32.9  Stenosis of left carotid artery I65.22  
 Carpal tunnel syndrome of right wrist G56.01  
 Vomiting R11.10  Pain of upper abdomen R10.10  Sacral pain M53.3  Type 2 diabetes mellitus with neurologic complication, with long-term current use of insulin (HCC) E11.49, Z79.4  History of compression fracture of spine Z87.81  
 History of stroke Z86.73  Left lumbar radiculopathy M54.16  
 Sacroiliac joint pain M53.3  Stage 3 chronic kidney disease N18.3  Type 2 diabetes with nephropathy (HCC) E11.21  
 Full code status Z78.9  Hyperglycemia R73.9 The PSH, FH were reviewed. SH: Social History Substance Use Topics  Smoking status: Current Some Day Smoker Packs/day: 0.50 Years: 35.00 Types: Cigarettes Last attempt to quit: 2/20/2017  Smokeless tobacco: Never Used  Alcohol use No  
 
 
Medications/Allergies: 
Current Outpatient Prescriptions on File Prior to Visit Medication Sig Dispense Refill  aspirin delayed-release 81 mg tablet Take 81 mg by mouth daily.  dicyclomine (BENTYL) 10 mg capsule Take 20 mg by mouth four (4) times daily as needed.  DULoxetine (CYMBALTA) 30 mg capsule Take 30 mg by mouth two (2) times a day.  lisinopril (PRINIVIL, ZESTRIL) 10 mg tablet TAKE 1 TABLET BY MOUTH ONCE DAILY 90 Tab 0  pravastatin (PRAVACHOL) 40 mg tablet TAKE 1 TABLET BY MOUTH ONCE DAILY 90 Tab 1  carvedilol (COREG) 12.5 mg tablet TAKE 1 TABLET BY MOUTH TWICE DAILY WITH MEALS 180 Tab 0  
  insulin glargine (LANTUS) 100 unit/mL injection 60 Units by SubCUTAneous route daily. 5 Vial 0  
 insulin aspart U-100 (NOVOLOG) 100 unit/mL injection 20 Units by SubCUTAneous route Before breakfast, lunch, and dinner. 60 mL 0  
 Liraglutide (VICTOZA) 0.6 mg/0.1 mL (18 mg/3 mL) pnij 0.6 mg by SubCUTAneous route daily. 5 Pen 0  
 gabapentin (NEURONTIN) 300 mg capsule Take 300 mg by mouth daily.  mometasone (ELOCON) 0.1 % lotion Apply to scalp. Let sit 15 min before rinsing. Use daily x 1 week. 60 mL 0  
 glipiZIDE (GLUCOTROL) 10 mg tablet TAKE ONE TABLET BY MOUTH TWICE DAILY 180 Tab 0  
 umeclidinium (INCRUSE ELLIPTA) 62.5 mcg/actuation inhaler Take 1 Puff by inhalation daily. 1 Inhaler 1  promethazine (PHENERGAN) 25 mg tablet Take 1 Tab by mouth every eight (8) hours as needed for Nausea. 20 Tab 0  
 metFORMIN (GLUCOPHAGE) 1,000 mg tablet TAKE ONE TABLET BY MOUTH TWICE DAILY WITH FOOD 180 Tab 0  
 diazePAM (VALIUM) 5 mg tablet Take 5 mg by mouth every six (6) hours as needed for Anxiety.  ferrous sulfate (IRON) 325 mg (65 mg iron) tablet Take 325 mg by mouth Daily (before breakfast). Indications: Iron Deficiency Anemia  alcohol swabs padm Test bs daily. E11.65 300 Pad 3  Blood Glucose Control High&Low (ACCU-CHEK KEVIN CONTROL SOLN) soln Use as directed. E11.65 3 mL 0  Blood-Glucose Meter (ACCU-CHEK KEVIN PLUS METER) misc Check blood glucose twice daily and as needed. E11.49 1 Each 0  
 glucose blood VI test strips (ACCU-CHEK KEVIN PLUS TEST STRP) strip Check blood glucose twice daily and as needed. E11.49 200 Strip 3  
 alendronate (FOSAMAX) 70 mg tablet Take 1 Tab by mouth every seven (7) days. 30 Tab 1  clotrimazole-betamethasone (LOTRISONE) topical cream Apply bid to affected area (pea-sized amount) 15 g 0  
 clopidogrel (PLAVIX) 75 mg tab Take 1 Tab by mouth daily.  (Patient taking differently: Take 150 mg by mouth two (2) times a day.) 90 Tab 3  
  insulin syringe-needle U-100 Dispense ultrafine 0.5 mL syringes with 31 gauge needle 100 Syringe 11  
 albuterol-ipratropium (DUO-NEB) 2.5 mg-0.5 mg/3 ml nebu 3 mL by Nebulization route four (4) times daily as needed.  azelastine (ASTELIN) 137 mcg (0.1 %) nasal spray 1 [place] by Both Nostrils route two (2) times a day. Use in each nostril as directed 1 Bottle 3  
 HYDROcodone-acetaminophen (NORCO) 5-325 mg per tablet Take 1 Tab by mouth two (2) times daily as needed for Pain. Max Daily Amount: 2 Tabs. Indications: chronic pain 42 Tab 0  
 ipratropium (ATROVENT) 0.02 % nebulizer solution 2.5 mL by Nebulization route every four (4) hours (while awake). Indications: PREVENTION OF BRONCHOSPASM WITH CHRONIC BRONCHITIS 60 mL 1  
 albuterol (PROVENTIL HFA, VENTOLIN HFA, PROAIR HFA) 90 mcg/actuation inhaler Take 2 Puffs by inhalation every four (4) hours as needed for Wheezing. Indications: Chronic Obstructive Pulmonary Disease 1 Inhaler 1 No current facility-administered medications on file prior to visit. Allergies Allergen Reactions  Percocet [Oxycodone-Acetaminophen] Rash and Itching Can Take Percocet but must take Benadryl for itching  Perfume Ht52 Rash Perfume specific:  MUSK  Pravastatin Itching Not sure Objective: 
Visit Vitals  /86 (BP 1 Location: Right arm, BP Patient Position: Sitting)  Pulse 77  Temp 98.4 °F (36.9 °C) (Oral)  Resp 20  
 Ht 5' (1.524 m)  Wt 191 lb (86.6 kg)  SpO2 93%  BMI 37.3 kg/m2 Constitutional: Well developed, nourished, no distress, alert, obese habitus CV: S1, S2.  RRR. No murmurs/rubs. No thrills palpated. No carotid bruits. Intact distal pulses. No edema. No aortic bruits. Pulm: No abnormalities on inspection. Clear to auscultation bilaterally. No wheezing/rhonchi. Normal effort. Neuro: A/O x 3. No focal motor or sensory deficits. Speech dysarthric. +R facial droop Labwork and Ancillary Studies: CBC w/Diff Lab Results Component Value Date/Time WBC 7.1 03/06/2018 12:21 PM  
 HGB 11.4 (L) 03/06/2018 12:21 PM  
 PLATELET 106 03/67/4155 12:21 PM  
  
 
 Basic Metabolic Profile/LFTs Lab Results Component Value Date/Time Sodium 135 (L) 03/06/2018 12:21 PM  
 Potassium 4.5 03/06/2018 12:21 PM  
 Chloride 103 03/06/2018 12:21 PM  
 CO2 24 03/06/2018 12:21 PM  
 Anion gap 8 03/06/2018 12:21 PM  
 Glucose 182 (H) 03/06/2018 12:21 PM  
 BUN 25 (H) 03/06/2018 12:21 PM  
 Creatinine 1.10 03/06/2018 12:21 PM  
 BUN/Creatinine ratio 23 (H) 03/06/2018 12:21 PM  
 GFR est AA >60 03/06/2018 12:21 PM  
 GFR est non-AA 50 (L) 03/06/2018 12:21 PM  
 Calcium 9.3 03/06/2018 12:21 PM  
  
Lab Results Component Value Date/Time ALT (SGPT) 23 03/06/2018 12:21 PM  
 AST (SGOT) 12 (L) 03/06/2018 12:21 PM  
 Alk. phosphatase 97 03/06/2018 12:21 PM  
 Bilirubin, direct <0.1 04/28/2016 03:42 AM  
 Bilirubin, total 0.2 03/06/2018 12:21 PM  
 
 
Cholesterol Lab Results Component Value Date/Time Cholesterol, total 169 02/28/2018 12:00 AM  
 HDL Cholesterol 42 02/28/2018 12:00 AM  
 LDL, calculated 86 02/28/2018 12:00 AM  
 Triglyceride 207 (H) 02/28/2018 12:00 AM  
 CHOL/HDL Ratio 3.7 02/19/2017 05:01 AM  
 
MRI:. Small areas of restricted diffusion in the left cerebellum, left internal capsule, right splenium and left occipital lobe suggest embolic phenomenon acute or subacute infarctions in the posterior vascular distribution. 2. Old infarctions include left PCA territory infarction and bilateral parieto-occipital PCA/MCA watershed territory infarctions. 3. Advanced chronic microvascular changes, or leukokraurosis. 4. Intracranial MRA is degraded by motion, but no proximal vascular occlusion. 5. Again, study is limited by motion artifact and number of sequences.

## 2018-09-13 NOTE — PROGRESS NOTES
Dana Jacobs is a 80 y.o. female (: 1950) presenting to address: 
 
Transition of care Vitals:  
 18 0715 BP: 120/86 Pulse: 77 Resp: 20 Temp: 98.4 °F (36.9 °C) TempSrc: Oral  
SpO2: 93% Weight: 191 lb (86.6 kg) Height: 5' (1.524 m) PainSc:   3 PainLoc: Back Learning Assessment:  
 
Learning Assessment 10/22/2015 PRIMARY LEARNER Patient HIGHEST LEVEL OF EDUCATION - PRIMARY LEARNER  -  
BARRIERS PRIMARY LEARNER -  
CO-LEARNER CAREGIVER -  
PRIMARY LANGUAGE ENGLISH  
LEARNER PREFERENCE PRIMARY READING  
ANSWERED BY pt  
RELATIONSHIP SELF Depression Screening: PHQ over the last two weeks 2018 PHQ Not Done Active Diagnosis of Depression or Bipolar Disorder Little interest or pleasure in doing things - Feeling down, depressed, irritable, or hopeless - Total Score PHQ 2 - Trouble falling or staying asleep, or sleeping too much - Feeling tired or having little energy - Poor appetite, weight loss, or overeating - Feeling bad about yourself - or that you are a failure or have let yourself or your family down - Trouble concentrating on things such as school, work, reading, or watching TV - Moving or speaking so slowly that other people could have noticed; or the opposite being so fidgety that others notice - Thoughts of being better off dead, or hurting yourself in some way -  
PHQ 9 Score - How difficult have these problems made it for you to do your work, take care of your home and get along with others - Fall Risk Assessment:  
 
Fall Risk Assessment, last 12 mths 2018 Able to walk? Yes Fall in past 12 months? No  
Fall with injury? -  
Number of falls in past 12 months - Fall Risk Score -  
 
Abuse Screening:  
 
Abuse Screening Questionnaire 2018 Do you ever feel afraid of your partner? Severiano Organ Are you in a relationship with someone who physically or mentally threatens you? Severiano Organ Is it safe for you to go home?  Jaron Sprinkles  
 
 Coordination of Care Questionaire: 1. Have you been to the ER, urgent care clinic since your last visit? Hospitalized since your last visit? YES Jessica BARTLETT 9/6/18- 9/8/18 2. Have you seen or consulted any other health care providers outside of the [address] [place] [address] since your last visit? Include any pap smears or colon screening. NO Advanced Directive: 1. Do you have an Advanced Directive? YES 
 
2. Would you like information on Advanced Directives?  NO

## 2018-09-17 ENCOUNTER — OFFICE VISIT (OUTPATIENT)
Dept: CARDIOLOGY CLINIC | Age: 68
End: 2018-09-17

## 2018-09-17 ENCOUNTER — TELEPHONE (OUTPATIENT)
Dept: FAMILY MEDICINE CLINIC | Age: 68
End: 2018-09-17

## 2018-09-17 VITALS
WEIGHT: 190 LBS | HEIGHT: 60 IN | SYSTOLIC BLOOD PRESSURE: 145 MMHG | OXYGEN SATURATION: 99 % | DIASTOLIC BLOOD PRESSURE: 90 MMHG | BODY MASS INDEX: 37.3 KG/M2 | HEART RATE: 106 BPM

## 2018-09-17 DIAGNOSIS — E78.00 PURE HYPERCHOLESTEROLEMIA: ICD-10-CM

## 2018-09-17 DIAGNOSIS — Z86.73 HISTORY OF CVA (CEREBROVASCULAR ACCIDENT): Primary | ICD-10-CM

## 2018-09-17 DIAGNOSIS — I10 ESSENTIAL HYPERTENSION: ICD-10-CM

## 2018-09-17 NOTE — MR AVS SNAPSHOT
[address] [facility] [place], operated by [facility] 
 
 
 [address] [id number] 
[phone] Patient: Kylee Quiroga MRN: [id number] Visit Information Date & Time Provider Department Dept. Phone Encounter #  
 9/17/2018  1:00 PM Tom Mckeon  [facility] Specialist at [address] [place] [address] [phone] [id number] Follow-up Instructions Follow-up and Disposition History Your Appointments 10/19/2018 10:30 AM  
Follow Up with Puneet Fraire MD  
3 [facility] Appt Note: 6 week f/u 8/8/18 AM; r/s for later date per avs summary [address] [facility] [address] [facility] [id number] [phone]  
  
   
 [address] [place] [address] Upcoming Health Maintenance Date Due Influenza Age 5 to Adult 8/1/2018 EYE EXAM RETINAL OR DILATED Q1 10/5/2018 HEMOGLOBIN A1C Q6M 2/8/2019 FOOT EXAM Q1 2/14/2019 MICROALBUMIN Q1 2/28/2019 LIPID PANEL Q1 2/28/2019 MEDICARE YEARLY EXAM 8/9/2019 BREAST CANCER SCRN MAMMOGRAM 8/11/2019 GLAUCOMA SCREENING Q2Y 10/5/2019 COLONOSCOPY 10/6/2019 DTaP/Tdap/Td series (2 - Td) 7/26/2026 Allergies as of 9/17/2018  Review Complete On: 9/13/2018 By: Puneet Fraire MD  
  
 Severity Noted Reaction Type Reactions Percocet [Oxycodone-acetaminophen]  10/08/2010    Rash, Itching Can Take Percocet but must take Benadryl for itching Perfume Ht52  02/05/2015    Rash Perfume specific:  MUSK Pravastatin  09/26/2017    Itching Not sure Current Immunizations  Reviewed on 4/18/2016 Name Date Influenza High Dose Vaccine PF 8/21/2017 11:43 AM, 10/19/2016  9:33 AM  
 Influenza Vaccine 10/19/2015  8:47 AM, 10/17/2013 Influenza Vaccine Whole 12/1/2008 Pneumococcal Polysaccharide (PPSV-23) 4/18/2016  1:50 PM  
 ZZZ-RETIRED (DO NOT USE) Pneumococcal Vaccine (Unspecified Type) 12/1/2008 Not reviewed this visit You Were Diagnosed With   
  
 Codes Comments History of CVA (cerebrovascular accident)    -  Primary ICD-10-CM: Z86.73 
ICD-9-CM: V12.54 Essential hypertension     ICD-10-CM: I10 
ICD-9-CM: 401.9 Pure hypercholesterolemia     ICD-10-CM: E78.00 ICD-9-CM: 272.0 Vitals BP Pulse Height(growth percentile) Weight(growth percentile) SpO2 BMI  
 145/90 (!) 106 5' (1.524 m) 190 lb (86.2 kg) 99% 37.11 kg/m2 OB Status Smoking Status Hysterectomy Current Some Day Smoker BMI and BSA Data Body Mass Index Body Surface Area  
 37.11 kg/m 2 1.91 m 2 Preferred Pharmacy Pharmacy Name Phone [address] [phone] Your Updated Medication List  
  
   
This list is accurate as of 9/17/18  1:55 PM.  Always use your most recent med list.  
  
  
  
  
 albuterol 90 mcg/actuation inhaler Commonly known as:  PROVENTIL HFA, VENTOLIN HFA, PROAIR HFA Take 2 Puffs by inhalation every four (4) hours as needed for Wheezing. Indications: Chronic Obstructive Pulmonary Disease  
  
 alcohol swabs Padm Test bs daily. E11.65  
  
 alendronate 70 mg tablet Commonly known as:  FOSAMAX Take 1 Tab by mouth every seven (7) days. aspirin delayed-release 325 mg tablet Take 1 Tab by mouth daily. atorvastatin 40 mg tablet Commonly known as:  LIPITOR Take 1 Tab by mouth daily. Blood Glucose Control High&Low Soln Commonly known as:  ACCU-CHEK KEVIN CONTROL SOLN  
Use as directed. E11.65 Blood-Glucose Meter Misc Commonly known as:  ACCU-CHEK KEVIN PLUS METER Check blood glucose twice daily and as needed. E11.49  
  
 carvedilol 12.5 mg tablet Commonly known as:  COREG  
TAKE 1 TABLET BY MOUTH TWICE DAILY WITH MEALS  
  
 clopidogrel 75 mg Tab Commonly known as:  PLAVIX Take 1 Tab by mouth daily.   
  
 clotrimazole 1 % topical cream  
 Commonly known as:  Hank Pheasant Apply  to affected area two (2) times a day. clotrimazole-betamethasone topical cream  
Commonly known as:  Ni Scriver Apply bid to affected area (pea-sized amount) diazePAM 5 mg tablet Commonly known as:  VALIUM Take 5 mg by mouth every six (6) hours as needed for Anxiety. dicyclomine 10 mg capsule Commonly known as:  BENTYL Take 20 mg by mouth four (4) times daily as needed. DULoxetine 30 mg capsule Commonly known as:  CYMBALTA Take 30 mg by mouth two (2) times a day.  
  
 gabapentin 300 mg capsule Commonly known as:  NEURONTIN Take 300 mg by mouth daily. glipiZIDE 10 mg tablet Commonly known as:  GLUCOTROL  
TAKE ONE TABLET BY MOUTH TWICE DAILY  
  
 glucose blood VI test strips strip Commonly known as:  ACCU-CHEK KEVIN PLUS TEST STRP Check blood glucose twice daily and as needed. E11.49 HYDROcodone-acetaminophen 5-325 mg per tablet Commonly known as:  Juanetta Rasp Take 1 Tab by mouth two (2) times daily as needed for Pain. Max Daily Amount: 2 Tabs. Indications: chronic pain  
  
 insulin aspart U-100 100 unit/mL injection Commonly known as:  NOVOLOG  
20 Units by SubCUTAneous route Before breakfast, lunch, and dinner. insulin glargine 100 unit/mL injection Commonly known as:  LANTUS  
70 Units by SubCUTAneous route daily. insulin syringe-needle U-100 Dispense ultrafine 0.5 mL syringes with 31 gauge needle  
  
 ipratropium 0.02 % Soln Commonly known as:  ATROVENT  
2.5 mL by Nebulization route every four (4) hours (while awake). Indications: PREVENTION OF BRONCHOSPASM WITH CHRONIC BRONCHITIS Iron 325 mg (65 mg iron) tablet Generic drug:  ferrous sulfate Take 325 mg by mouth Daily (before breakfast). Indications: Iron Deficiency Anemia Liraglutide 0.6 mg/0.1 mL (18 mg/3 mL) Pnij Commonly known as:  VICTOZA  
1.8 mg by SubCUTAneous route daily. lisinopril 10 mg tablet Commonly known as:  PRINIVIL, ZESTRIL  
TAKE 1 TABLET BY MOUTH ONCE DAILY  
  
 metFORMIN 1,000 mg tablet Commonly known as:  GLUCOPHAGE  
TAKE ONE TABLET BY MOUTH TWICE DAILY WITH FOOD  
  
 mometasone 0.1 % lotion Commonly known as:  Trevor Home Apply to scalp. Let sit 15 min before rinsing. Use daily x 1 week. promethazine 25 mg tablet Commonly known as:  PHENERGAN Take 1 Tab by mouth every eight (8) hours as needed for Nausea. umeclidinium 62.5 mcg/actuation inhaler Commonly known as:  INCRUSE ELLIPTA Take 1 Puff by inhalation daily. Patient Instructions Pawel Patient  EP Instructions 1. You are scheduled to have a Loop Recorder placed on September 25, 2018 at 10:00 am. Please arrive at 8:00 am. 
 
2. Please go to [facility] and park in the outpatient parking lot. Once you enter check in with the  there. The  will either give you directions or assist you in getting to the cath holding area. 3.  [x]       You are not to eat or drink anything after midnight the morning of the  
            procedure. 4. Please continue to take your medications with a small sip of water on the morning of the procedure with the following exceptions:   
 
5. If you are diabetic, do not take your insulin/sugar pill the morning of the procedure. Pre -procedure LABWORK is to be done within one week of your procedure date 6. We encourage families to wait in the waiting room on the first floor while the procedure is being done. The Doctor will come out and talk with you as soon as the procedure is over. 7. There is the possibility that you may spend the night in the hospital, depending on the results of the procedure. This will be determined after the procedure is done.   
 
8.   If you or your family have any questions, please call our office Monday-Friday 9:00am  
 -4:30 pm , at [phone], and ask to speak to one of the nurses. Patient Instructions History Introducing [address]! [facility] introduces [employer] patient portal. Now you can access parts of your medical record, email your doctor's office, and request medication refills online. 1. In your internet browser, go to [email or web address] 2. Click on the First Time User? Click Here link in the Sign In box. You will see the New Member Sign Up page. 3. Enter your [employer] Access Code exactly as it appears below. You will not need to use this code after youve completed the sign-up process. If you do not sign up before the expiration date, you must request a new code. · [employer] Access Code: [email or web address] Expires: 12/12/2018 10:21 AM 
 
4. Enter the last four digits of your Social Security Number (xxxx) and Date of Birth (mm/dd/yyyy) as indicated and click Submit. You will be taken to the next sign-up page. 5. Create a [employer] ID. This will be your [employer] login ID and cannot be changed, so think of one that is secure and easy to remember. 6. Create a [employer] password. You can change your password at any time. 7. Enter your Password Reset Question and Answer. This can be used at a later time if you forget your password. 8. Enter your e-mail address. You will receive e-mail notification when new information is available in [address] 9. Click Sign Up. You can now view and download portions of your medical record. 10. Click the Download Summary menu link to download a portable copy of your medical information. If you have questions, please visit the Frequently Asked Questions section of the [employer] website. Remember, [employer] is NOT to be used for urgent needs. For medical emergencies, dial 911. Now available from your iPhone and Android! Please provide this summary of care documentation to your next provider. Your primary care clinician is listed as Norma Emanuel. If you have any questions after today's visit, please call [phone].

## 2018-09-17 NOTE — PATIENT INSTRUCTIONS
Brannon Duron Patient  EP Instructions 1. You are scheduled to have a Loop Recorder placed on September 25, 2018 at 10:00 am. Please arrive at 8:00 am. 
 
2. Please go to [facility] and park in the outpatient parking lot. Once you enter check in with the  there. The  will either give you directions or assist you in getting to the cath holding area. 3.  [x]       You are not to eat or drink anything after midnight the morning of the  
            procedure. 4. Please continue to take your medications with a small sip of water on the morning of the procedure with the following exceptions:   
 
5. If you are diabetic, do not take your insulin/sugar pill the morning of the procedure. Pre -procedure LABWORK is to be done within one week of your procedure date 6. We encourage families to wait in the waiting room on the first floor while the procedure is being done. The Doctor will come out and talk with you as soon as the procedure is over. 7. There is the possibility that you may spend the night in the hospital, depending on the results of the procedure. This will be determined after the procedure is done. 8.   If you or your family have any questions, please call our office Monday-Friday 9:00am  
      -4:30 pm , at [phone], and ask to speak to one of the nurses.

## 2018-09-17 NOTE — TELEPHONE ENCOUNTER
Please verify pt is taking plavix. Last prescribed 3/2017. If not, I want her taking both ASA + plavix. Verify that she has neuro appt scheduled as well.

## 2018-09-17 NOTE — PROGRESS NOTES
1. Have you been to the ER, urgent care clinic since your last visit? Hospitalized since your last visit ? Yes [place]  
 
2. Have you seen or consulted any other health care providers outside of the [address] [place] [address] since your last visit? Include any pap smears or colon screening.  No
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
## 2018-09-18 ENCOUNTER — DOCUMENTATION ONLY (OUTPATIENT)
Dept: CARDIOLOGY CLINIC | Age: 68
End: 2018-09-18

## 2018-09-18 NOTE — PROGRESS NOTES
Attempting to get prior auth through [employer]- they requesting further documentation, for was fill out and faxed along with office note-See scanned doc in chart 9/18/18    [employer] called and states patient has to have 30 day event monitor done before they will authorize loop implant or Dr. Carissa Yung has to call and do peer to peer

## 2018-09-18 NOTE — TELEPHONE ENCOUNTER
Ms. Sherman Hadley says she is taking asa and plavix. She says she has a neurology appt 9/19 with \"another Dr. Donald Sarkar".

## 2018-09-24 ENCOUNTER — TELEPHONE (OUTPATIENT)
Dept: CARDIOLOGY CLINIC | Age: 68
End: 2018-09-24

## 2018-09-24 NOTE — TELEPHONE ENCOUNTER
Attempted to contact pt/pts   at [facility] number, no answer. Lvm on secure like authorization was approved for loop. Advised pt to return call to office at [phone]. Will await return call.

## 2018-09-24 NOTE — TELEPHONE ENCOUNTER
Contacted pt at [facility] number spoke with . Two patient Identifiers confirmed. Advised pts  per notes below. Pt verbalized understanding.

## 2018-09-24 NOTE — TELEPHONE ENCOUNTER
Verbal order and read back per Turner Pardo MD  Will do peer to peer and cancel loop scheduled for tomorrow.

## 2018-09-24 NOTE — TELEPHONE ENCOUNTER
Incoming from [facility]. Two patient Identifiers confirmed. She stated she needed to know if Dr Vamshi Graff wanted pt to have 30 day event monitor or loop recorder. Advised that Dr Vamshi Graff was out of office Friday but was in office today and I would consult and send message. [facility]  verbalized understanding.

## 2018-09-24 NOTE — TELEPHONE ENCOUNTER
Contacted Sarah. Two patient Identifiers confirmed. She stated peer to peer had been reviewed and approved.  Approval # [id number]

## 2018-09-25 ENCOUNTER — HOSPITAL ENCOUNTER (OUTPATIENT)
Age: 68
Setting detail: OUTPATIENT SURGERY
Discharge: HOME | End: 2018-09-25
Attending: INTERNAL MEDICINE | Admitting: INTERNAL MEDICINE
Payer: MEDICARE

## 2018-09-25 VITALS
HEART RATE: 78 BPM | RESPIRATION RATE: 18 BRPM | OXYGEN SATURATION: 97 % | DIASTOLIC BLOOD PRESSURE: 81 MMHG | SYSTOLIC BLOOD PRESSURE: 168 MMHG

## 2018-09-25 DIAGNOSIS — R55 SYNCOPE: ICD-10-CM

## 2018-09-25 LAB — GLUCOSE BLD STRIP.AUTO-MCNC: 277 MG/DL (ref 70–110)

## 2018-09-25 PROCEDURE — 77030002933 HC SUT MCRYL J&J -A: Performed by: INTERNAL MEDICINE

## 2018-09-25 PROCEDURE — 82962 GLUCOSE BLOOD TEST: CPT

## 2018-09-25 PROCEDURE — C1764 EVENT RECORDER, CARDIAC: HCPCS | Performed by: INTERNAL MEDICINE

## 2018-09-25 PROCEDURE — 74011000250 HC RX REV CODE- 250: Performed by: INTERNAL MEDICINE

## 2018-09-25 PROCEDURE — 33282 HC IMP PT CARD EVENT RECORD: CPT | Performed by: INTERNAL MEDICINE

## 2018-09-25 DEVICE — RECORDER CARD MON REVEAL LINQ MYCARELINK IMPL LINQSYS: Type: IMPLANTABLE DEVICE | Site: CHEST | Status: FUNCTIONAL

## 2018-09-25 RX ORDER — LIDOCAINE HYDROCHLORIDE AND EPINEPHRINE 10; 10 MG/ML; UG/ML
INJECTION, SOLUTION INFILTRATION; PERINEURAL AS NEEDED
Status: DISCONTINUED | OUTPATIENT
Start: 2018-09-25 | End: 2018-09-25 | Stop reason: HOSPADM

## 2018-09-25 NOTE — PERIOP NOTES
Phase 2 Recovery Summary Patient arrived to Phase 2 at 5050 Report received from [address] [place] [address] Vitals:  
 09/25/18 5189 BP: 168/81 Pulse: 78 Resp: 18 SpO2: 97%  
 
 
oriented to time, place, person and situation Lines and Drains Peripheral Intravenous Line:   
 
Wound 1005- Contacted Cath Lab pertaining to previous blood glucose of 277. No orders were in place to treat and no report given on methods to resolve. Spoke with Jagruti ARMIJO for Dr. Angelika Hogue. She stated to hold any medications and allow patient to treat at home once patient had eaten. Reiterated instructions with patient and  and they agreed. Patient discharged to home with , Donnie Adkins  at [address] [place] [address]

## 2018-09-25 NOTE — PERIOP NOTES
Called the Cath Lab talked to THE [facility] and stated the blood sugar is 277, she will need to let Dr Dequan Mcintyre know. Rafita transferred patient to the cath lab and knows the blood sugar is elevated.

## 2018-09-25 NOTE — DISCHARGE INSTRUCTIONS
Cardiac Event Monitoring: About This Test  What is it? Cardiac event monitoring is a test that records the electrical activity of your heart. The test is done with a heart monitor device that you may wear or keep with you for up to a month. This test may be done to find out why you're having symptoms. Or it may be done to look for heartbeats that are too fast, too slow, or irregular. These are cardiac events. The monitor will give your doctor information similar to an electrocardiogram (EKG or ECG). An EKG translates the heart's electrical activity into line tracings on paper. There are different types of monitors. Your doctor will choose the type that works best for you and is most likely to help diagnose your heart problem. These monitors are safe to use. No electricity is sent through your body. So there is no chance of getting an electric shock. Why is this test done? This test is used to look for irregular heartbeats. It can help your doctor find out what is causing chest pain, fainting, lightheadedness, or other symptoms of heart problems. It also can help the doctor check to see if treatment for an irregular heartbeat is working. Many people have irregular heartbeats from time to time. Because these kinds of heartbeats can come and go, it may be hard to record one while you are in the doctor's office. Monitoring your heart for a longer time and during your normal activities makes it easier to capture your cardiac events. What happens before the test?  If you are getting a monitor with electrode pads on your chest:  · Several areas on your chest may be shaved and cleaned. Then a small amount of gel will be put on those areas. The electrode pads will then be attached to the skin of your chest. Thin wires will connect the electrodes to the monitor. · You will get instructions for how and when to change the electrodes at home. Some types of monitors don't use electrode pads.  Some types are worn on your wrist like a watch. Others are stuck to your chest with a sticky patch. Or you may have a monitor that you carry with you. Your doctor will explain which type of monitor you have and how to use it. What happens during the test?  · Your monitor may start recording on its own when it detects an abnormal heartbeat. Or you may need to do something to start the recording when you have symptoms. You might use a handheld device to start the monitor. Or you may need to press a button on the monitor itself. Your doctor will explain which type you have and what you need to do. · You may keep a diary of what you were doing when you had symptoms such as chest pain or dizziness. Your doctor will show you how to do this. · You may need to send the information from your monitor to your doctor through a phone line or online. Some monitors send it automatically. You will get instructions from your doctor. Your information will stay private and secure whether you send it or it is sent automatically. · You may be able to do most of the things you usually do. But it's important to follow your doctor's instructions for bathing, exercise, and other daily activities. How long does the test take? You may use the monitor for up to a month or longer. It depends on how long it takes to record irregular heartbeat episodes. It also depends on how long your doctor wants to keep monitoring your heart. What happens after the test?  · Bonilla Restrepo return the monitor to your doctor's office or hospital.  · You'll meet with your doctor to talk about the information recorded during your test.  · Your doctor will check your diary of symptoms. He or she will compare the timing of your activities and symptoms with the recorded heart pattern. · Depending on your test results, your doctor may talk with you about other tests or treatment options. Follow-up care is a key part of your treatment and safety.  Be sure to make and go to all appointments, and call your doctor if you are having problems. It's also a good idea to keep a list of the medicines you take. Ask your doctor when you can expect to have your test results. Where can you learn more? Go to http://flores-kendell.info/. Enter C398 in the search box to learn more about \"Cardiac Event Monitoring: About This Test.\"  Current as of: December 6, 2017  Content Version: 11.7  © 3333-8642 [employer]. Care instructions adapted under license by [employer] (which disclaims liability or warranty for this information). If you have questions about a medical condition or this instruction, always ask your healthcare professional. [place] [address] any warranty or liability for your use of this information. DISCHARGE SUMMARY from Nurse    PATIENT INSTRUCTIONS:    After general anesthesia or intravenous sedation, for 24 hours or while taking prescription Narcotics:  · Limit your activities  · Do not drive and operate hazardous machinery  · Do not make important personal or business decisions  · Do  not drink alcoholic beverages  · If you have not urinated within 8 hours after discharge, please contact your surgeon on call. Report the following to your surgeon:  · Excessive pain, swelling, redness or odor of or around the surgical area  · Temperature over 100.5  · Nausea and vomiting lasting longer than 4 hours or if unable to take medications  · Any signs of decreased circulation or nerve impairment to extremity: change in color, persistent  numbness, tingling, coldness or increase pain  · Any questions    What to do at Home:  Recommended activity: Activity as tolerated and no driving for today    These are general instructions for a healthy lifestyle:    No smoking/ No tobacco products/ Avoid exposure to second hand smoke  Surgeon General's Warning:  Quitting smoking now greatly reduces serious risk to your health.     Obesity, smoking, and sedentary lifestyle greatly increases your risk for illness    A healthy diet, regular physical exercise & weight monitoring are important for maintaining a healthy lifestyle    You may be retaining fluid if you have a history of heart failure or if you experience any of the following symptoms:  Weight gain of 3 pounds or more overnight or 5 pounds in a week, increased swelling in our hands or feet or shortness of breath while lying flat in bed. Please call your doctor as soon as you notice any of these symptoms; do not wait until your next office visit. Recognize signs and symptoms of STROKE:    F-face looks uneven    A-arms unable to move or move unevenly    S-speech slurred or non-existent    T-time-call 911 as soon as signs and symptoms begin-DO NOT go       Back to bed or wait to see if you get better-TIME IS BRAIN. Warning Signs of HEART ATTACK     Call 911 if you have these symptoms:   Chest discomfort. Most heart attacks involve discomfort in the center of the chest that lasts more than a few minutes, or that goes away and comes back. It can feel like uncomfortable pressure, squeezing, fullness, or pain.  Discomfort in other areas of the upper body. Symptoms can include pain or discomfort in one or both arms, the back, neck, jaw, or stomach.  Shortness of breath with or without chest discomfort.  Other signs may include breaking out in a cold sweat, nausea, or lightheadedness. Don't wait more than five minutes to call 911 - MINUTES MATTER! Fast action can save your life. Calling 911 is almost always the fastest way to get lifesaving treatment. Emergency Medical Services staff can begin treatment when they arrive -- up to an hour sooner than if someone gets to the hospital by car. The discharge information has been reviewed with the patient. The patient verbalized understanding.   Discharge medications reviewed with the patient and appropriate educational materials and side effects teaching were provided. Patient armband removed and given to patient to take home. Patient was informed of the privacy risks if armband lost or stolen.

## 2018-09-25 NOTE — IP AVS SNAPSHOT
[address] Patient: Jenny Ojeda MRN: [id number] INF:[id number] About your hospitalization You were admitted on:  September 25, 2018 You last received care in the:  [address] [place] [address] You were discharged on:  September 25, 2018 Why you were hospitalized Your primary diagnosis was:  Not on File Follow-up Information Follow up With Details Comments Contact Info Latasha Castro [facility] [address] [place] [address] [place] [address] [phone] Your Scheduled Appointments Friday October 19, 2018 10:30 AM EDT Follow Up with Larry Strauss MD  
[facility] [address] [facility]) [address] [facility] [address] [facility] [address] [phone] Discharge Orders None A check leatha indicates which time of day the medication should be taken. My Medications ASK your doctor about these medications Instructions Each Dose to Equal  
 Morning Noon Evening Bedtime  
 albuterol 90 mcg/actuation inhaler Commonly known as:  PROVENTIL HFA, VENTOLIN HFA, PROAIR HFA Your last dose was: Your next dose is: Take 2 Puffs by inhalation every four (4) hours as needed for Wheezing. Indications: Chronic Obstructive Pulmonary Disease 2 Puff  
    
   
   
   
  
 alcohol swabs Padm Your last dose was: Your next dose is:    
   
   
 Test bs daily. E11.65  
     
   
   
   
  
 alendronate 70 mg tablet Commonly known as:  FOSAMAX Your last dose was: Your next dose is: Take 1 Tab by mouth every seven (7) days. 70 mg  
    
   
   
   
  
 aspirin delayed-release 325 mg tablet Your last dose was: Your next dose is: Take 1 Tab by mouth daily. 325 mg  
    
   
   
   
  
 atorvastatin 40 mg tablet Commonly known as:  LIPITOR Your last dose was: Your next dose is: Take 1 Tab by mouth daily. 40 mg Blood Glucose Control High&Low Soln Commonly known as:  ACCU-CHEK KEVIN CONTROL SOLN Your last dose was: Your next dose is:    
   
   
 Use as directed. E11.65 Blood-Glucose Meter Misc Commonly known as:  ACCU-CHEK KEVIN PLUS METER Your last dose was: Your next dose is:    
   
   
 Check blood glucose twice daily and as needed. E11.49  
     
   
   
   
  
 carvedilol 12.5 mg tablet Commonly known as:  Jestine Pellet Your last dose was: Your next dose is: TAKE 1 TABLET BY MOUTH TWICE DAILY WITH MEALS  
     
   
   
   
  
 clopidogrel 75 mg Tab Commonly known as:  PLAVIX Your last dose was: Your next dose is: Take 1 Tab by mouth daily. 75 mg  
    
   
   
   
  
 clotrimazole 1 % topical cream  
Commonly known as:  Natasha Norwalk Your last dose was: Your next dose is:    
   
   
 Apply  to affected area two (2) times a day. clotrimazole-betamethasone topical cream  
Commonly known as:  Ladonna  Your last dose was: Your next dose is:    
   
   
 Apply bid to affected area (pea-sized amount) diazePAM 5 mg tablet Commonly known as:  VALIUM Your last dose was: Your next dose is: Take 5 mg by mouth every six (6) hours as needed for Anxiety. 5 mg  
    
   
   
   
  
 dicyclomine 10 mg capsule Commonly known as:  BENTYL Your last dose was: Your next dose is: Take 20 mg by mouth four (4) times daily as needed. 20 mg DULoxetine 30 mg capsule Commonly known as:  CYMBALTA Your last dose was: Your next dose is: Take 30 mg by mouth two (2) times a day.   
 30 mg  
    
   
   
   
 gabapentin 300 mg capsule Commonly known as:  NEURONTIN Your last dose was: Your next dose is: Take 300 mg by mouth daily. 300 mg  
    
   
   
   
  
 glipiZIDE 10 mg tablet Commonly known as:  Myriam Betters Your last dose was: Your next dose is: TAKE ONE TABLET BY MOUTH TWICE DAILY  
     
   
   
   
  
 glucose blood VI test strips strip Commonly known as:  ACCU-CHEK KEVIN PLUS TEST STRP Your last dose was: Your next dose is:    
   
   
 Check blood glucose twice daily and as needed. E11.49 HYDROcodone-acetaminophen 5-325 mg per tablet Commonly known as:  Britta Ling Your last dose was: Your next dose is: Take 1 Tab by mouth two (2) times daily as needed for Pain. Max Daily Amount: 2 Tabs. Indications: chronic pain 1 Tab  
    
   
   
   
  
 insulin aspart U-100 100 unit/mL injection Commonly known as:  Salli Rocher Your last dose was: Your next dose is:    
   
   
 20 Units by SubCUTAneous route Before breakfast, lunch, and dinner. 20 Units  
    
   
   
   
  
 insulin glargine 100 unit/mL injection Commonly known as:  LANTUS Your last dose was: Your next dose is:    
   
   
 70 Units by SubCUTAneous route daily. 70 Units  
    
   
   
   
  
 insulin syringe-needle U-100 Your last dose was: Your next dose is:    
   
   
 Dispense ultrafine 0.5 mL syringes with 31 gauge needle  
     
   
   
   
  
 ipratropium 0.02 % Soln Commonly known as:  ATROVENT Your last dose was: Your next dose is:    
   
   
 2.5 mL by Nebulization route every four (4) hours (while awake). Indications: PREVENTION OF BRONCHOSPASM WITH CHRONIC BRONCHITIS  
 0.5 mg  
    
   
   
   
  
 Iron 325 mg (65 mg iron) tablet Generic drug:  ferrous sulfate Your last dose was: Your next dose is: Take 325 mg by mouth Daily (before breakfast). Indications: Iron Deficiency Anemia 325 mg  
    
   
   
   
  
 Liraglutide 0.6 mg/0.1 mL (18 mg/3 mL) Pnij Commonly known as:  Josey Mantle Your last dose was: Your next dose is:    
   
   
 1.8 mg by SubCUTAneous route daily. 1.8 mg  
    
   
   
   
  
 lisinopril 10 mg tablet Commonly known as:  Donnald Code Your last dose was: Your next dose is: TAKE 1 TABLET BY MOUTH ONCE DAILY  
     
   
   
   
  
 metFORMIN 1,000 mg tablet Commonly known as:  GLUCOPHAGE Your last dose was: Your next dose is: TAKE ONE TABLET BY MOUTH TWICE DAILY WITH FOOD  
     
   
   
   
  
 mometasone 0.1 % lotion Commonly known as:  Nerissa Bottcher Your last dose was: Your next dose is:    
   
   
 Apply to scalp. Let sit 15 min before rinsing. Use daily x 1 week. promethazine 25 mg tablet Commonly known as:  PHENERGAN Your last dose was: Your next dose is: Take 1 Tab by mouth every eight (8) hours as needed for Nausea. 25 mg  
    
   
   
   
  
 umeclidinium 62.5 mcg/actuation inhaler Commonly known as:  INCRUSE ELLIPTA Your last dose was: Your next dose is: Take 1 Puff by inhalation daily. 1 Puff Opioid Education Prescription Opioids: What You Need to Know: 
 
Prescription opioids can be used to help relieve moderate-to-severe pain and are often prescribed following a surgery or injury, or for certain health conditions. These medications can be an important part of treatment but also come with serious risks. Opioids are strong pain medicines. Examples include hydrocodone, oxycodone, fentanyl, and morphine. Heroin is an example of an illegal opioid. It is important to work with your health care provider to make sure you are getting the safest, most effective care. WHAT ARE THE RISKS AND SIDE EFFECTS OF OPIOID USE? Prescription opioids carry serious risks of addiction and overdose, especially with prolonged use. An opioid overdose, often marked by slow breathing, can cause sudden death. The use of prescription opioids can have a number of side effects as well, even when taken as directed. · Tolerance-meaning you might need to take more of a medication for the same pain relief · Physical dependence-meaning you have symptoms of withdrawal when the medication is stopped. Withdrawal symptoms can include nausea, sweating, chills, diarrhea, stomach cramps, and muscle aches. Withdrawal can last up to several weeks, depending on which drug you took and how long you took it. · Increased sensitivity to pain · Constipation · Nausea, vomiting, and dry mouth · Sleepiness and dizziness · Confusion · Depression · Low levels of testosterone that can result in lower sex drive, energy, and strength · Itching and sweating RISKS ARE GREATER WITH:      
· History of drug misuse, substance use disorder, or overdose · Mental health conditions (such as depression or anxiety) · Sleep apnea · Older age (72 years or older) · Pregnancy Avoid alcohol while taking prescription opioids. Also, unless specifically advised by your health care provider, medications to avoid include: · Benzodiazepines (such as Xanax or Valium) · Muscle relaxants (such as Soma or Flexeril) · Hypnotics (such as Ambien or Lunesta) · Other prescription opioids KNOW YOUR OPTIONS Talk to your health care provider about ways to manage your pain that don't involve prescription opioids. Some of these options may actually work better and have fewer risks and side effects. Consult your physician before adding or stopping any medications, treatments, or physical activity. Options may include: 
· Pain relievers such as acetaminophen, ibuprofen, and naproxen · Some medications that are also used for depression or seizures · Physical therapy and exercise · Counseling to help patients learn how to cope better with triggers of pain and stress. · Application of heat or cold compress · Massage therapy · Relaxation techniques Be Informed Make sure you know the name of your medication, how much and how often to take it, and its potential risks & side effects. IF YOU ARE PRESCRIBED OPIOIDS FOR PAIN: 
· Never take opioids in greater amounts or more often than prescribed. Remember the goal is not to be pain-free but to manage your pain at a tolerable level. · Follow up with your primary care provider to: · Work together to create a plan on how to manage your pain. · Talk about ways to help manage your pain that don't involve prescription opioids. · Talk about any and all concerns and side effects. · Help prevent misuse and abuse. · Never sell or share prescription opioids · Help prevent misuse and abuse. · Store prescription opioids in a secure place and out of reach of others (this may include visitors, children, friends, and family). · Safely dispose of unused/unwanted prescription opioids: Find your community drug take-back program or your pharmacy mail-back program, or flush them down the toilet, following guidance from the Food and Drug Administration (www.fda.gov/Drugs/ResourcesForYou). · Visit www.cdc.gov/drugoverdose to learn about the risks of opioid abuse and overdose. · If you believe you may be struggling with addiction, tell your health care provider and ask for guidance or call [address] [place], [place] [address]Five minutes at [phone]. Discharge Instructions Cardiac Event Monitoring: About This Test 
What is it? Cardiac event monitoring is a test that records the electrical activity of your heart.  The test is done with a heart monitor device that you may wear or keep with you for up to a month. This test may be done to find out why you're having symptoms. Or it may be done to look for heartbeats that are too fast, too slow, or irregular. These are cardiac events. The monitor will give your doctor information similar to an electrocardiogram (EKG or ECG). An EKG translates the heart's electrical activity into line tracings on paper. There are different types of monitors. Your doctor will choose the type that works best for you and is most likely to help diagnose your heart problem. These monitors are safe to use. No electricity is sent through your body. So there is no chance of getting an electric shock. Why is this test done? This test is used to look for irregular heartbeats. It can help your doctor find out what is causing chest pain, fainting, lightheadedness, or other symptoms of heart problems. It also can help the doctor check to see if treatment for an irregular heartbeat is working. Many people have irregular heartbeats from time to time. Because these kinds of heartbeats can come and go, it may be hard to record one while you are in the doctor's office. Monitoring your heart for a longer time and during your normal activities makes it easier to capture your cardiac events. What happens before the test? 
If you are getting a monitor with electrode pads on your chest: 
· Several areas on your chest may be shaved and cleaned. Then a small amount of gel will be put on those areas. The electrode pads will then be attached to the skin of your chest. Thin wires will connect the electrodes to the monitor. · You will get instructions for how and when to change the electrodes at home. Some types of monitors don't use electrode pads. Some types are worn on your wrist like a watch. Others are stuck to your chest with a sticky patch. Or you may have a monitor that you carry with you. Your doctor will explain which type of monitor you have and how to use it. What happens during the test? 
· Your monitor may start recording on its own when it detects an abnormal heartbeat. Or you may need to do something to start the recording when you have symptoms. You might use a handheld device to start the monitor. Or you may need to press a button on the monitor itself. Your doctor will explain which type you have and what you need to do. · You may keep a diary of what you were doing when you had symptoms such as chest pain or dizziness. Your doctor will show you how to do this. · You may need to send the information from your monitor to your doctor through a phone line or online. Some monitors send it automatically. You will get instructions from your doctor. Your information will stay private and secure whether you send it or it is sent automatically. · You may be able to do most of the things you usually do. But it's important to follow your doctor's instructions for bathing, exercise, and other daily activities. How long does the test take? You may use the monitor for up to a month or longer. It depends on how long it takes to record irregular heartbeat episodes. It also depends on how long your doctor wants to keep monitoring your heart. What happens after the test? 
· Mery Mora return the monitor to your doctor's office or hospital. 
· You'll meet with your doctor to talk about the information recorded during your test. 
· Your doctor will check your diary of symptoms. He or she will compare the timing of your activities and symptoms with the recorded heart pattern. · Depending on your test results, your doctor may talk with you about other tests or treatment options. Follow-up care is a key part of your treatment and safety. Be sure to make and go to all appointments, and call your doctor if you are having problems. It's also a good idea to keep a list of the medicines you take. Ask your doctor when you can expect to have your test results. Where can you learn more? Go to http://flores-kendell.info/. Enter E817 in the search box to learn more about \"Cardiac Event Monitoring: About This Test.\" Current as of: December 6, 2017 Content Version: 11.7 © 8852-4673 "[employer]. Care instructions adapted under license by [employer] (which disclaims liability or warranty for this information). If you have questions about a medical condition or this instruction, always ask your healthcare professional. [address] any warranty or liability for your use of this information. DISCHARGE SUMMARY from Nurse PATIENT INSTRUCTIONS: 
 
After general anesthesia or intravenous sedation, for 24 hours or while taking prescription Narcotics: · Limit your activities · Do not drive and operate hazardous machinery · Do not make important personal or business decisions · Do  not drink alcoholic beverages · If you have not urinated within 8 hours after discharge, please contact your surgeon on call. Report the following to your surgeon: 
· Excessive pain, swelling, redness or odor of or around the surgical area · Temperature over 100.5 · Nausea and vomiting lasting longer than 4 hours or if unable to take medications · Any signs of decreased circulation or nerve impairment to extremity: change in color, persistent  numbness, tingling, coldness or increase pain · Any questions What to do at Home: 
Recommended activity: Activity as tolerated and no driving for today These are general instructions for a healthy lifestyle: No smoking/ No tobacco products/ Avoid exposure to second hand smoke Surgeon General's Warning:  Quitting smoking now greatly reduces serious risk to your health. Obesity, smoking, and sedentary lifestyle greatly increases your risk for illness A healthy diet, regular physical exercise & weight monitoring are important for maintaining a healthy lifestyle You may be retaining fluid if you have a history of heart failure or if you experience any of the following symptoms:  Weight gain of 3 pounds or more overnight or 5 pounds in a week, increased swelling in our hands or feet or shortness of breath while lying flat in bed. Please call your doctor as soon as you notice any of these symptoms; do not wait until your next office visit. Recognize signs and symptoms of STROKE: 
 
F-face looks uneven A-arms unable to move or move unevenly S-speech slurred or non-existent T-time-call 911 as soon as signs and symptoms begin-DO NOT go Back to bed or wait to see if you get better-TIME IS BRAIN. Warning Signs of HEART ATTACK Call 911 if you have these symptoms: 
? Chest discomfort. Most heart attacks involve discomfort in the center of the chest that lasts more than a few minutes, or that goes away and comes back. It can feel like uncomfortable pressure, squeezing, fullness, or pain. ? Discomfort in other areas of the upper body. Symptoms can include pain or discomfort in one or both arms, the back, neck, jaw, or stomach. ? Shortness of breath with or without chest discomfort. ? Other signs may include breaking out in a cold sweat, nausea, or lightheadedness. Don't wait more than five minutes to call [address]! Fast action can save your life. Calling 911 is almost always the fastest way to get lifesaving treatment. Emergency Medical Services staff can begin treatment when they arrive  up to an hour sooner than if someone gets to the hospital by car. The discharge information has been reviewed with the patient. The patient verbalized understanding. Discharge medications reviewed with the patient and appropriate educational materials and side effects teaching were provided. Patient armband removed and given to patient to take home. Patient was informed of the privacy risks if armband lost or stolen. ACO Transitions of Care Introducing [facility] [address] offers a voluntary care coordination program to provide high quality service and care to [facility] fee-for-service beneficiaries. Manjeet Lloyd was designed to help you enhance your health and well-being through the following services: ? Transitions of Care  support for individuals who are transitioning from one care setting to another (example: Hospital to home). ? Chronic and Complex Care Coordination  support for individuals and caregivers of those with serious or chronic illnesses or with more than one chronic (ongoing) condition and those who take a number of different medications. If you meet specific medical criteria, a [address] [place] [address] Rd may call you directly to coordinate your care with your primary care physician and your other care providers. For questions about the [facility] programs, please, contact your physicians office. For general questions or additional information about Accountable Care Organizations: 
Please visit www.medicare.gov/acos. html or call 1-800-MEDICARE (7-624.958.9516) TTY users should call 1-849.229.4956. Introducing [facility]! Anaya Stover introduces [employer] patient portal. Now you can access parts of your medical record, email your doctor's office, and request medication refills online. 1. In your internet browser, go to [email or web address] 2. Click on the First Time User? Click Here link in the Sign In box. You will see the New Member Sign Up page. 3. Enter your [employer] Access Code exactly as it appears below. You will not need to use this code after youve completed the sign-up process. If you do not sign up before the expiration date, you must request a new code. · [employer] Access Code: [email or web address] Expires: 12/12/2018 10:21 AM 
 
 4. Enter the last four digits of your Social Security Number (xxxx) and Date of Birth (mm/dd/yyyy) as indicated and click Submit. You will be taken to the next sign-up page. 5. Create a [employer] ID. This will be your [employer] login ID and cannot be changed, so think of one that is secure and easy to remember. 6. Create a [employer] password. You can change your password at any time. 7. Enter your Password Reset Question and Answer. This can be used at a later time if you forget your password. 8. Enter your e-mail address. You will receive e-mail notification when new information is available in [address] 9. Click Sign Up. You can now view and download portions of your medical record. 10. Click the Download Summary menu link to download a portable copy of your medical information. If you have questions, please visit the Frequently Asked Questions section of the [employer] website. Remember, [employer] is NOT to be used for urgent needs. For medical emergencies, dial 911. Now available from your iPhone and Android! Introducing José Murray As a [facility] patient, I wanted to make you aware of our electronic visit tool called José Murray. [facility] 24/7 allows you to connect within minutes with a medical provider 24 hours a day, seven days a week via a mobile device or tablet or logging into a secure website from your computer. You can access José Domomaurafin from anywhere in the [place]. A virtual visit might be right for you when you have a simple condition and feel like you just dont want to get out of bed, or cant get away from work for an appointment, when your regular [facility] provider is not available (evenings, weekends or holidays), or when youre out of town and need minor care.   Electronic visits cost only $49 and if the José Domodallas provider determines a prescription is needed to treat your condition, one can be electronically transmitted to a nearby pharmacy*. Please take a moment to enroll today if you have not already done so. The enrollment process is free and takes just a few minutes. To enroll, please download the [employer] 24/7 laura to your tablet or phone, or visit [email or web address] to enroll on your computer. And, as an [address] [place] [address] patient with a [employer] account, the results of your visits will be scanned into your electronic medical record and your primary care provider will be able to view the scanned results. We urge you to continue to see your regular [employer] provider for your ongoing medical care. And while your primary care provider may not be the one available when you seek a [employer] virtual visit, the peace of mind you get from getting a real diagnosis real time can be priceless. For more information on [employer], view our Frequently Asked Questions (FAQs) at [email or web address]. Sincerely, 
 
Valentino Milks, MD 
Chief Medical Officer [employer] *:  certain medications cannot be prescribed via [employer] Providers Seen During Your Hospitalization Provider Specialty Primary office phone Karan Dolan MD Cardiology [phone] Your Primary Care Physician (PCP) Primary Care Physician Office Phone [facility]ax [facility] [phone] You are allergic to the following Allergen Reactions Percocet (Oxycodone-Acetaminophen) Rash Itching Can Take Percocet but must take Benadryl for itching Perfume Ht52 Rash Perfume specific:  MUSK Pravastatin Itching Not sure, pt denies Recent Documentation OB Status Smoking Status Hysterectomy Current Some Day Smoker Emergency Contacts Name Discharge Info Relation Home Work Mobile  Yan Powell DISCHARGE CAREGIVER [3] Spouse [3] [phone] Patient Belongings The following personal items are in your possession at time of discharge: 
  Dental Appliances: None  Visual Aid: None          Jewelry: Ring  Clothing: Footwear, Shirt, Shorts, Undergarments    Other Valuables: Rodney Lyn Please provide this summary of care documentation to your next provider. Signatures-by signing, you are acknowledging that this After Visit Summary has been reviewed with you and you have received a copy. Patient Signature:  ____________________________________________________________ Date:  ____________________________________________________________  
  
Byron Ortiz Provider Signature:  ____________________________________________________________ Date:  ____________________________________________________________

## 2018-09-25 NOTE — Clinical Note
TRANSFER - OUT REPORT:  
 
Verbal report given to: Christy STOVALL. Report consisted of patient's Situation, Background, Assessment and  
Recommendations(SBAR). Opportunity for questions and clarification was provided. Patient transported with a Cardiac Cath Tech / Patient Care Tech. Patient transported to: [facility].

## 2018-09-25 NOTE — H&P
Please see clinic note  for detail. I saw and examined patient and confirmed above. No interval change. Labs reviewed. Procedure explained to patient and all risk and benefit discussed again Risk, benefit, complication of loop recorder implantation were discussed in detail willing to proceed with procedure. Proceed as planned. History and physical has been reviewed.  There have been no significant clinical changes since the completion of the originally dated History and Physical. 
------------------------------------------------------------------------------------------------------------------

## 2018-09-26 ENCOUNTER — PATIENT OUTREACH (OUTPATIENT)
Dept: FAMILY MEDICINE CLINIC | Age: 68
End: 2018-09-26

## 2018-10-01 DIAGNOSIS — M54.16 LEFT LUMBAR RADICULOPATHY: ICD-10-CM

## 2018-10-02 RX ORDER — DULOXETIN HYDROCHLORIDE 30 MG/1
CAPSULE, DELAYED RELEASE ORAL
Qty: 60 CAP | Refills: 2 | Status: SHIPPED | OUTPATIENT
Start: 2018-10-02 | End: 2019-04-24 | Stop reason: SDUPTHER

## 2018-10-02 RX ORDER — DULOXETIN HYDROCHLORIDE 30 MG/1
CAPSULE, DELAYED RELEASE ORAL
Qty: 60 CAP | Refills: 2 | Status: SHIPPED | OUTPATIENT
Start: 2018-10-02 | End: 2018-10-19 | Stop reason: SDUPTHER

## 2018-10-03 RX ORDER — PROMETHAZINE HYDROCHLORIDE 25 MG/1
TABLET ORAL
Qty: 20 TAB | Refills: 0 | Status: SHIPPED | OUTPATIENT
Start: 2018-10-03 | End: 2018-11-16 | Stop reason: SDUPTHER

## 2018-10-19 ENCOUNTER — TELEPHONE (OUTPATIENT)
Dept: FAMILY MEDICINE CLINIC | Age: 68
End: 2018-10-19

## 2018-10-19 ENCOUNTER — OFFICE VISIT (OUTPATIENT)
Dept: FAMILY MEDICINE CLINIC | Age: 68
End: 2018-10-19

## 2018-10-19 VITALS
DIASTOLIC BLOOD PRESSURE: 80 MMHG | BODY MASS INDEX: 37.22 KG/M2 | HEART RATE: 80 BPM | WEIGHT: 189.6 LBS | HEIGHT: 60 IN | OXYGEN SATURATION: 95 % | RESPIRATION RATE: 20 BRPM | TEMPERATURE: 98.5 F | SYSTOLIC BLOOD PRESSURE: 122 MMHG

## 2018-10-19 DIAGNOSIS — Z23 ENCOUNTER FOR IMMUNIZATION: ICD-10-CM

## 2018-10-19 DIAGNOSIS — Z79.4 TYPE 2 DIABETES MELLITUS WITH OTHER NEUROLOGIC COMPLICATION, WITH LONG-TERM CURRENT USE OF INSULIN (HCC): ICD-10-CM

## 2018-10-19 DIAGNOSIS — E11.49 TYPE 2 DIABETES MELLITUS WITH OTHER NEUROLOGIC COMPLICATION, WITH LONG-TERM CURRENT USE OF INSULIN (HCC): ICD-10-CM

## 2018-10-19 DIAGNOSIS — I63.432 CEREBROVASCULAR ACCIDENT (CVA) DUE TO EMBOLISM OF LEFT POSTERIOR CEREBRAL ARTERY (HCC): Primary | ICD-10-CM

## 2018-10-19 LAB — HBA1C MFR BLD HPLC: 10.4 %

## 2018-10-19 RX ORDER — INSULIN GLARGINE 100 [IU]/ML
75 INJECTION, SOLUTION SUBCUTANEOUS DAILY
Qty: 10 VIAL | Refills: 0 | Status: SHIPPED | OUTPATIENT
Start: 2018-10-19 | End: 2019-01-15 | Stop reason: SDUPTHER

## 2018-10-19 RX ORDER — INSULIN ASPART 100 [IU]/ML
25 INJECTION, SOLUTION INTRAVENOUS; SUBCUTANEOUS
Qty: 80 ML | Refills: 0 | Status: SHIPPED | OUTPATIENT
Start: 2018-10-19 | End: 2019-01-15 | Stop reason: SDUPTHER

## 2018-10-19 RX ORDER — INSULIN ASPART 100 [IU]/ML
25 INJECTION, SOLUTION INTRAVENOUS; SUBCUTANEOUS
Qty: 75 ML | Refills: 0 | Status: SHIPPED | OUTPATIENT
Start: 2018-10-19 | End: 2018-10-19 | Stop reason: SDUPTHER

## 2018-10-19 RX ORDER — DICYCLOMINE HYDROCHLORIDE 10 MG/1
10 CAPSULE ORAL
Qty: 120 CAP | Refills: 1 | Status: SHIPPED | OUTPATIENT
Start: 2018-10-19 | End: 2019-10-25 | Stop reason: ALTCHOICE

## 2018-10-19 NOTE — PROGRESS NOTES
Assessment/Plan:    1. Cerebrovascular accident (CVA) due to embolism of left posterior cerebral artery ([address]  _I'm not convinced pt is taking plavix as prescribed. Has neuro f/u 10/29 with Dr. Patti Morrison.  to call and verify pt is taking all meds on list.   Loop recorder to eval for PAF. ? Consider xarelto given stroke occurred while supposedly taking asa + plavix and appears embolic in nature on MRI. Continue working with ST.    2. Type 2 diabetes mellitus with other neurologic complication, with long-term current use of insulin (HCC)  -A1c worse. incr lantus to 75 units. incr novolog ot 25 units tid. Cont other meds and  to verify correct dosing/compliance. She will come back in ONE month with bs log with sugars check TID. I said I will not see her in a month unless she has bs log b/c I can't titrate insulin w/o readings. - AMB POC HEMOGLOBIN A1C  - insulin aspart U-100 (NOVOLOG) 100 unit/mL injection; 25 Units by SubCUTAneous route Before breakfast, lunch, and dinner. Dispense: 75 mL; Refill: 0  - insulin glargine (LANTUS) 100 unit/mL injection; 75 Units by SubCUTAneous route daily. Dispense: 10 Vial; Refill: 0    3. Encounter for immunization  - INFLUENZA VACCINE INACTIVATED (IIV), SUBUNIT, ADJUVANTED, IM  - ADMIN INFLUENZA VIRUS VAC      The plan was discussed with the patient. The patient verbalized understanding and is in agreement with the plan. All medication potential side effects were discussed with the patient.     Health Maintenance:   Health Maintenance   Topic Date Due    Shingrix Vaccine Age 49> (1 of 2) 04/07/2001    Influenza Age 5 to Adult  08/01/2018    EYE EXAM RETINAL OR DILATED Q1  10/05/2018    HEMOGLOBIN A1C Q6M  02/08/2019    FOOT EXAM Q1  02/14/2019    MICROALBUMIN Q1  02/28/2019    LIPID PANEL Q1  02/28/2019    MEDICARE YEARLY EXAM  08/09/2019    BREAST CANCER SCRN MAMMOGRAM  08/11/2019    GLAUCOMA SCREENING Q2Y  10/05/2019    COLONOSCOPY 10/06/2019    DTaP/Tdap/Td series (2 - Td) 07/26/2026    Hepatitis C Screening  Completed    Bone Densitometry (Dexa) Screening  Completed    Pneumococcal 65+ Low/Medium Risk  Completed       Perry Wagner is a 80 y.o. female and presents with Cerebrovascular Accident and Diabetes     Subjective:  Embolic stroke - had loop recorder implanted last month to look for PAF as source of embolism. She was on 81mg ASA +plavix when stroke happened. Neuro in the hospital just recommended dose increase asa to 325mg. DM2 - dose lantus and victoza increased at last visit. bs running 170s fasting. A1c is worse, now 10.4      ROS:  Constitutional: No recent weight change. No weakness/fatigue. No f/c. Cardiovascular: No CP/palpitations. No NINA/orthopnea/PND. Respiratory: No cough/sputum, dyspnea, wheezing. Gastointestinal: No dysphagia, reflux. No n/v. No constipation/diarrhea. No melena/rectal bleeding. The problem list was updated as a part of today's visit.   Patient Active Problem List   Diagnosis Code    Vitamin D deficiency E55.9    Hx of breast cancer Z85.3    Chronic pain syndrome G89.4    Osteoarthrosis, unspecified whether generalized or localized, unspecified site S49.85    Diastolic congestive heart failure (HCC) I50.30    COPD (chronic obstructive pulmonary disease) (HCC) J44.9    GERD (gastroesophageal reflux disease) K21.9    Tobacco dependence F17.200    Chronic radicular lumbar pain M54.16, G89.29    Scoliosis M41.9    Essential hypertension I10    Pure hypercholesterolemia E78.00    CAD (coronary artery disease) I25.10    Spinal stenosis of lumbar region with neurogenic claudication M48.062    S/P lumbar fusion Z98.1    Osteoporosis M81.0    Hemianopsia H53.47    Reactive depression F32.9    Stenosis of left carotid artery I65.22    Carpal tunnel syndrome of right wrist G56.01    Sacral pain M53.3    Type 2 diabetes mellitus with neurologic complication, with long-term current use of insulin (HCC) E11.49, Z79.4    History of compression fracture of spine Z87.81    History of stroke Z86.73    Left lumbar radiculopathy M54.16    Sacroiliac joint pain M53.3    Stage 3 chronic kidney disease (HCC) N18.3    Diabetic nephropathy associated with type 2 diabetes mellitus (HCC) E11.21    Full code status Z78.9    Cerebrovascular accident (CVA) due to embolism of left posterior cerebral artery (HCC) I08.031       The PSH,  were reviewed. SH:  Social History     Tobacco Use    Smoking status: Current Some Day Smoker     Packs/day: 0.50     Years: 35.00     Pack years: 17.50     Types: Cigarettes     Last attempt to quit: 2017     Years since quittin.6    Smokeless tobacco: Never Used   Substance Use Topics    Alcohol use: No    Drug use: No       Medications/Allergies:  Current Outpatient Medications on File Prior to Visit   Medication Sig Dispense Refill    promethazine (PHENERGAN) 25 mg tablet TAKE 1 TABLET BY MOUTH EVERY 8 HOURS AS NEEDED FOR NAUSEA 20 Tab 0    DULoxetine (CYMBALTA) 30 mg capsule TAKE 1 CAPSULE BY MOUTH ONCE DAILY AT BEDTIME FOR 7 DAYS THEN  INCREASE  TO  2  CAPSULES  BY  MOUTH  ONCE  DAILY  AT  BEDTIME  THEREAFTER 60 Cap 2    DULoxetine (CYMBALTA) 30 mg capsule TAKE 1 CAPSULE BY MOUTH ONCE DAILY AT BEDTIME FOR 7 DAYS, THEN INCREASE TO 2 CAPSULES BY MOUTH ONCE DAILY AT BEDTIME THEREAFTER 60 Cap 2    aspirin delayed-release 325 mg tablet Take 1 Tab by mouth daily.  atorvastatin (LIPITOR) 40 mg tablet Take 1 Tab by mouth daily. 90 Tab 3    insulin glargine (LANTUS) 100 unit/mL injection 70 Units by SubCUTAneous route daily. 10 Vial 0    Liraglutide (VICTOZA) 0.6 mg/0.1 mL (18 mg/3 mL) pnij 1.8 mg by SubCUTAneous route daily. 10 Pen 0    clotrimazole (LOTRIMIN) 1 % topical cream Apply  to affected area two (2) times a day. 45 g 0    dicyclomine (BENTYL) 10 mg capsule Take 20 mg by mouth four (4) times daily as needed.       DULoxetine (CYMBALTA) 30 mg capsule Take 30 mg by mouth two (2) times a day.  lisinopril (PRINIVIL, ZESTRIL) 10 mg tablet TAKE 1 TABLET BY MOUTH ONCE DAILY 90 Tab 0    carvedilol (COREG) 12.5 mg tablet TAKE 1 TABLET BY MOUTH TWICE DAILY WITH MEALS 180 Tab 0    insulin aspart U-100 (NOVOLOG) 100 unit/mL injection 20 Units by SubCUTAneous route Before breakfast, lunch, and dinner. 60 mL 0    gabapentin (NEURONTIN) 300 mg capsule Take 300 mg by mouth daily.  mometasone (ELOCON) 0.1 % lotion Apply to scalp. Let sit 15 min before rinsing. Use daily x 1 week. 60 mL 0    glipiZIDE (GLUCOTROL) 10 mg tablet TAKE ONE TABLET BY MOUTH TWICE DAILY 180 Tab 0    umeclidinium (INCRUSE ELLIPTA) 62.5 mcg/actuation inhaler Take 1 Puff by inhalation daily. 1 Inhaler 1    metFORMIN (GLUCOPHAGE) 1,000 mg tablet TAKE ONE TABLET BY MOUTH TWICE DAILY WITH FOOD 180 Tab 0    HYDROcodone-acetaminophen (NORCO) 5-325 mg per tablet Take 1 Tab by mouth two (2) times daily as needed for Pain. Max Daily Amount: 2 Tabs. Indications: chronic pain 42 Tab 0    diazePAM (VALIUM) 5 mg tablet Take 5 mg by mouth every six (6) hours as needed for Anxiety.  ferrous sulfate (IRON) 325 mg (65 mg iron) tablet Take 325 mg by mouth Daily (before breakfast). Indications: Iron Deficiency Anemia      alcohol swabs padm Test bs daily. E11.65 300 Pad 3    Blood Glucose Control High&Low (ACCU-CHEK KEVIN CONTROL SOLN) soln Use as directed. E11.65 3 mL 0    Blood-Glucose Meter (ACCU-CHEK KEVIN PLUS METER) misc Check blood glucose twice daily and as needed. E11.49 1 Each 0    glucose blood VI test strips (ACCU-CHEK KEVIN PLUS TEST STRP) strip Check blood glucose twice daily and as needed. E11.49 200 Strip 3    alendronate (FOSAMAX) 70 mg tablet Take 1 Tab by mouth every seven (7) days.  30 Tab 1    clotrimazole-betamethasone (LOTRISONE) topical cream Apply bid to affected area (pea-sized amount) 15 g 0    clopidogrel (PLAVIX) 75 mg tab Take 1 Tab by mouth daily. (Patient taking differently: Take 150 mg by mouth two (2) times a day.) 90 Tab 3    ipratropium (ATROVENT) 0.02 % nebulizer solution 2.5 mL by Nebulization route every four (4) hours (while awake). Indications: PREVENTION OF BRONCHOSPASM WITH CHRONIC BRONCHITIS 60 mL 1    albuterol (PROVENTIL HFA, VENTOLIN HFA, PROAIR HFA) 90 mcg/actuation inhaler Take 2 Puffs by inhalation every four (4) hours as needed for Wheezing. Indications: Chronic Obstructive Pulmonary Disease 1 Inhaler 1    insulin syringe-needle U-100 Dispense ultrafine 0.5 mL syringes with 31 gauge needle 100 Syringe 11     No current facility-administered medications on file prior to visit. Allergies   Allergen Reactions    Percocet [Oxycodone-Acetaminophen] Rash and Itching     Can Take Percocet but must take Benadryl for itching     Perfume Ht52 Rash     Perfume specific:  MUSK    Pravastatin Itching     Not sure, pt denies       Objective:  Visit Vitals  /80 (BP 1 Location: Right arm, BP Patient Position: Sitting)   Pulse 80   Temp 98.5 °F (36.9 °C) (Oral)   Resp 20   Ht 5' (1.524 m)   Wt 189 lb 9.6 oz (86 kg)   SpO2 95%   BMI 37.03 kg/m²      Constitutional: Well developed, nourished, no distress, alert, obese habitus   CV: S1, S2.  RRR. No murmurs/rubs. No thrills palpated. No carotid bruits. Intact distal pulses. No edema. No aortic bruits. Pulm: No abnormalities on inspection. Clear to auscultation bilaterally. No wheezing/rhonchi. Normal effort. GI: Soft, nontender, nondistended. Normal active bowel sounds. Large central scar from previous surgeries   Neuro: A/O x 3. Dysarthria improved from last visit.

## 2018-10-19 NOTE — PROGRESS NOTES
Benjamín Arrington is a 80 y.o. female (: 1950) presenting to address:    Chief Complaint   Patient presents with    Cerebrovascular Accident    Diabetes       Vitals:    10/19/18 1042   BP: 122/80   Pulse: 80   Resp: 20   Temp: 98.5 °F (36.9 °C)   TempSrc: Oral   SpO2: 95%   Weight: 189 lb 9.6 oz (86 kg)   Height: 5' (1.524 m)   PainSc:   4   PainLoc: Back       Learning Assessment:     Learning Assessment 10/22/2015   PRIMARY LEARNER Patient   HIGHEST LEVEL OF EDUCATION - PRIMARY LEARNER  -   BARRIERS PRIMARY LEARNER -   CO-LEARNER CAREGIVER -   PRIMARY LANGUAGE ENGLISH   LEARNER PREFERENCE PRIMARY READING   ANSWERED BY pt   RELATIONSHIP SELF     Depression Screening:     PHQ over the last two weeks 2018   PHQ Not Done Active Diagnosis of Depression or Bipolar Disorder   Little interest or pleasure in doing things -   Feeling down, depressed, irritable, or hopeless -   Total Score PHQ 2 -   Trouble falling or staying asleep, or sleeping too much -   Feeling tired or having little energy -   Poor appetite, weight loss, or overeating -   Feeling bad about yourself - or that you are a failure or have let yourself or your family down -   Trouble concentrating on things such as school, work, reading, or watching TV -   Moving or speaking so slowly that other people could have noticed; or the opposite being so fidgety that others notice -   Thoughts of being better off dead, or hurting yourself in some way -   PHQ 9 Score -   How difficult have these problems made it for you to do your work, take care of your home and get along with others -     Fall Risk Assessment:     Fall Risk Assessment, last 12 mths 2018   Able to walk? Yes   Fall in past 12 months? No   Fall with injury? -   Number of falls in past 12 months -   Fall Risk Score -     Abuse Screening:     Abuse Screening Questionnaire 2018   Do you ever feel afraid of your partner?  N   Are you in a relationship with someone who physically or mentally threatens you? N   Is it safe for you to go home? Y     Coordination of Care Questionaire:   1. Have you been to the ER, urgent care clinic since your last visit? Hospitalized since your last visit? [facility]    2. Have you seen or consulted any other health care providers outside of the [address] [place] [address] since your last visit? Include any pap smears or colon screening. YES Cardiology    Advanced Directive:   1. Do you have an Advanced Directive? YES    2. Would you like information on Advanced Directives?  NO

## 2018-10-19 NOTE — TELEPHONE ENCOUNTER
[facility] pharmacist called about the Novolog Rx. He said they cannot dispense 75 mL, only 70mL or 80 mL. Can you send a new Rx? They also asked that we specify vials vs. Flexpens.

## 2018-10-25 RX ORDER — METFORMIN HYDROCHLORIDE 1000 MG/1
TABLET ORAL
Qty: 180 TAB | Refills: 0 | Status: SHIPPED | OUTPATIENT
Start: 2018-10-25 | End: 2019-04-15 | Stop reason: ALTCHOICE

## 2018-11-01 ENCOUNTER — PATIENT OUTREACH (OUTPATIENT)
Dept: FAMILY MEDICINE CLINIC | Age: 68
End: 2018-11-01

## 2018-11-01 NOTE — PROGRESS NOTES
. 
Patient has graduated from the Transitions of Care Coordination  program on 11/1/2018. Patient's symptoms are stable at this time. Patient/family has the ability to self-manage. Care management goals have been completed at this time. No further nurse navigator follow up scheduled. Pt has nurse navigator's contact information for any further questions, concerns, or needs. Patients upcoming visits:   
Future Appointments Date Time Provider Mp Oleary 11/19/2018  2:30 PM Pamela Rivera MD [facility]

## 2018-11-06 ENCOUNTER — DOCUMENTATION ONLY (OUTPATIENT)
Dept: INTERNAL MEDICINE CLINIC | Age: 68
End: 2018-11-06

## 2018-11-13 ENCOUNTER — TELEPHONE (OUTPATIENT)
Dept: FAMILY MEDICINE CLINIC | Age: 68
End: 2018-11-13

## 2018-11-13 NOTE — TELEPHONE ENCOUNTER
Discontinued medication, [facility] was calling asking if she can take the pravastatin 40mg and the atorvastatin 40mg  at the same time. Also wanted to talk about the pain the pt was having in her stomach.     Call back numberis [phone]

## 2018-11-14 NOTE — TELEPHONE ENCOUNTER
Returned call to given number. This was Baldo Rocha, a nurse  from [facility] that did a home visit with the patient. She said the pt has pravastatin and atoravastatin at home and is taking both. Pt also has Aleve and naproxen at home and is also taking both. Pt told Baldo Rocha that she had a stroke three weeks ago and is having bad abdominal pain. Pt was scheduled 11/20/18 but nurse Baldo Rocha would like her seen sooner. Left msg for pt to return call regarding medications and schedule.  was not present during home visit.

## 2018-11-15 ENCOUNTER — TELEPHONE (OUTPATIENT)
Dept: FAMILY MEDICINE CLINIC | Age: 68
End: 2018-11-15

## 2018-11-15 NOTE — TELEPHONE ENCOUNTER
Scheduled pt for 11/16 appt. Spouse called today wanting pt to be seen today for the severe abdominal pain. No appts with Dr Nick Sims available and pt advised per pcp to return to ED if pain is 10/10 and very severe. Spouse advised. He will come to appt tomorrow with pt and seek urgent care if the pain worsens.

## 2018-11-16 ENCOUNTER — OFFICE VISIT (OUTPATIENT)
Dept: FAMILY MEDICINE CLINIC | Age: 68
End: 2018-11-16

## 2018-11-16 VITALS
HEART RATE: 83 BPM | SYSTOLIC BLOOD PRESSURE: 140 MMHG | HEIGHT: 60 IN | WEIGHT: 180.6 LBS | BODY MASS INDEX: 35.46 KG/M2 | TEMPERATURE: 97.7 F | RESPIRATION RATE: 20 BRPM | DIASTOLIC BLOOD PRESSURE: 98 MMHG | OXYGEN SATURATION: 96 %

## 2018-11-16 DIAGNOSIS — K59.00 CONSTIPATION, UNSPECIFIED CONSTIPATION TYPE: Primary | ICD-10-CM

## 2018-11-16 DIAGNOSIS — R10.33 PERIUMBILICAL ABDOMINAL PAIN: ICD-10-CM

## 2018-11-16 RX ORDER — MAG HYDROX/ALUMINUM HYD/SIMETH 200-200-20
15 SUSPENSION, ORAL (FINAL DOSE FORM) ORAL
COMMUNITY
Start: 2018-11-11 | End: 2018-11-21

## 2018-11-16 RX ORDER — PROMETHAZINE HYDROCHLORIDE 25 MG/1
TABLET ORAL
Qty: 30 TAB | Refills: 0 | Status: SHIPPED | OUTPATIENT
Start: 2018-11-16 | End: 2018-12-18 | Stop reason: SDUPTHER

## 2018-11-16 RX ORDER — POLYETHYLENE GLYCOL 3350 17 G/17G
POWDER, FOR SOLUTION ORAL
COMMUNITY
Start: 2018-11-11 | End: 2019-10-25 | Stop reason: ALTCHOICE

## 2018-11-16 NOTE — PATIENT INSTRUCTIONS
Magnesium Citrate (By mouth)   Magnesium Citrate (mag-NEE-zee-um SIT-rate)  Treats constipation, and empties the bowel before surgery or other medical procedures. Brand Name(s): Dulcolax Bowel Cleansing Kit, [facility] Magnesium Citrate, GoodSense magnesium citrate, [facility] Magnesium Citrate, , [employer] Magnesium Citrate, Ezequiel Citroma   There may be other brand names for this medicine. When This Medicine Should Not Be Used: You should not use this medicine if you have had an allergic reaction to a laxative product of this type, or if you have heart block or severe kidney disease. How to Use This Medicine:   Liquid  · Your doctor will tell you how much of this medicine to take and how often. Do not take more medicine or take it more often than your doctor tells you to. · If you are using this medicine without a prescription, follow the instructions on the medicine label. Do not use this medicine for longer than 1 week unless your doctor tells you otherwise. · It is best to take this medicine on an empty stomach. You may chill the medicine in the refrigerator to make it taste better. · Measure the medicine with a marked measuring spoon or medicine cup. If a dose is missed:   · If you miss a dose or forget to take your medicine, take it as soon as you can. If it is almost time for your next dose, wait until then to take the medicine and skip the missed dose. · Do not use extra medicine to make up for a missed dose. How to Store and Dispose of This Medicine:   · Store the medicine at room temperature in a closed container, away from heat, moisture, and direct light. Do not freeze. · Keep all medicine out of the reach of children and never share your medicine with anyone. Drugs and Foods to Avoid:   Ask your doctor or pharmacist before using any other medicine, including over-the-counter medicines, vitamins, and herbal products.   · Make sure your doctor knows if you are also using blood thinners (Coumadin®), ciprofloxacin (Cipro®), digoxin (Lanoxin®), etidronate (Didronel®), sodium polystyrene sulfonate ([place] Pump), or tetracycline. · Tell your doctor if you are on a low-salt diet. Warnings While Using This Medicine:   · Make sure your doctor knows if you are pregnant or breastfeeding, or if you have a bowel obstruction, a colostomy, or an ileostomy. · Stop using the medicine and call your doctor if you have severe stomach cramps, nausea, vomiting, or blood in your stools. Possible Side Effects While Using This Medicine:   Call your doctor right away if you notice any of these side effects:  · Confusion, weakness, irregular heartbeat, shortness of breath  · Lightheadedness or fainting  · Severe diarrhea  If you notice these less serious side effects, talk with your doctor:   · Mild diarrhea or stomach pain  If you notice other side effects that you think are caused by this medicine, tell your doctor. Call your doctor for medical advice about side effects. You may report side effects to FDA at 6-448-FDA-9513  © 2017 [facility] Information is for End User's use only and may not be sold, redistributed or otherwise used for commercial purposes. The above information is an  only. It is not intended as medical advice for individual conditions or treatments. Talk to your doctor, nurse or pharmacist before following any medical regimen to see if it is safe and effective for you.

## 2018-11-16 NOTE — PROGRESS NOTES
Assessment/Plan:    1. Constipation, unspecified constipation type  -do mag citrate otc. Drink plenty of fluids    2. Periumbilical abdominal pain- related to #1. The plan was discussed with the patient. The patient verbalized understanding and is in agreement with the plan. All medication potential side effects were discussed with the patient. Health Maintenance:   Health Maintenance   Topic Date Due    Shingrix Vaccine Age 49> (1 of 2) 04/07/2001    EYE EXAM RETINAL OR DILATED Q1  10/05/2018    FOOT EXAM Q1  02/14/2019    MICROALBUMIN Q1  02/28/2019    LIPID PANEL Q1  02/28/2019    HEMOGLOBIN A1C Q6M  04/19/2019    MEDICARE YEARLY EXAM  08/09/2019    BREAST CANCER SCRN MAMMOGRAM  08/11/2019    GLAUCOMA SCREENING Q2Y  10/05/2019    COLONOSCOPY  10/06/2019    DTaP/Tdap/Td series (2 - Td) 07/26/2026    Hepatitis C Screening  Completed    Bone Densitometry (Dexa) Screening  Completed    Pneumococcal 65+ Low/Medium Risk  Completed    Influenza Age 5 to Adult  Completed       Colleen Mario is a 80 y.o. female and presents with Abdominal Pain (ED follow up) and Constipation     Subjective:  Pt is brought in by , who helps present the hx. She c/o ongoing bilat abd pain x 3 weeks. Has been seen 3x in ER. The first time, was dx with a virus (was having vomiting) and given \"nausea pills\". CT abd on 11/4 and 11/9 didn't show anything acute. Had mesenteric PVLs which were neg. The third time, she was dx with constipation. Last bm was tiny and that was 4 days ago. She is unable to tell me what her sugars are b/c she doesn't check her bs.  states she is taking her meds. She states she checked her sugar yesterday and it was 229. In ER, not acidotic. Did have ketones in urine. Has had poor po intake due to lack of appetite. Took ex-lax last night and had some improvement in sx. Today she is having only mild pain, 4/10. ROS:  Constitutional: No recent weight change. No weakness/fatigue. No f/c. Cardiovascular: No CP/palpitations. No NINA/orthopnea/PND. Respiratory: No cough/sputum, dyspnea, wheezing. Gastointestinal: No dysphagia, reflux. No n/v. + constipation/no diarrhea. No melena/rectal bleeding. The problem list was updated as a part of today's visit. Patient Active Problem List   Diagnosis Code    Vitamin D deficiency E55.9    Hx of breast cancer Z85.3    Chronic pain syndrome G89.4    Osteoarthrosis, unspecified whether generalized or localized, unspecified site U74.57    Diastolic congestive heart failure (HCC) I50.30    COPD (chronic obstructive pulmonary disease) ([facility]) J44.9    GERD (gastroesophageal reflux disease) K21.9    Tobacco dependence F17.200    Chronic radicular lumbar pain M54.16, G89.29    Scoliosis M41.9    Essential hypertension I10    Pure hypercholesterolemia E78.00    CAD (coronary artery disease) I25.10    Spinal stenosis of lumbar region with neurogenic claudication M48.062    S/P lumbar fusion Z98.1    Osteoporosis M81.0    Hemianopsia H53.47    Reactive depression F32.9    Stenosis of left carotid artery I65.22    Carpal tunnel syndrome of right wrist G56.01    Sacral pain M53.3    Type 2 diabetes mellitus with neurologic complication, with long-term current use of insulin ([facility]) E11.49, Z79.4    History of compression fracture of spine Z87.81    History of stroke Z86.73    Left lumbar radiculopathy M54.16    Sacroiliac joint pain M53.3    Stage 3 chronic kidney disease (HCC) N18.3    Diabetic nephropathy associated with type 2 diabetes mellitus ([facility]) E11.21    Full code status Z78.9    Cerebrovascular accident (CVA) due to embolism of left posterior cerebral artery ([facility]) S01.700       The PSH, FH were reviewed.     SH:  Social History     Tobacco Use    Smoking status: Current Some Day Smoker     Packs/day: 0.50     Years: 35.00     Pack years: 17.50     Types: Cigarettes     Last attempt to quit: 2017     Years since quittin.7    Smokeless tobacco: Never Used   Substance Use Topics    Alcohol use: No    Drug use: No       Medications/Allergies:  Current Outpatient Medications on File Prior to Visit   Medication Sig Dispense Refill    alum-mag hydroxide-simeth (MYLANTA) 200-200-20 mg/5 mL susp 15 mL.  polyethylene glycol (MIRALAX) 17 gram packet Mix 1 packet (17g) into 4-8 ounces of beverage and drink once or twice daily. Results in 2-4 days, Max treatment length of 2 weeks.  metFORMIN (GLUCOPHAGE) 1,000 mg tablet TAKE 1 TABLET BY MOUTH TWICE DAILY WITH FOOD 180 Tab 0    dicyclomine (BENTYL) 10 mg capsule Take 1 Cap by mouth four (4) times daily as needed. 120 Cap 1    insulin glargine (LANTUS) 100 unit/mL injection 75 Units by SubCUTAneous route daily. 10 Vial 0    insulin aspart U-100 (NOVOLOG) 100 unit/mL injection 25 Units by SubCUTAneous route Before breakfast, lunch, and dinner. pens 80 mL 0    promethazine (PHENERGAN) 25 mg tablet TAKE 1 TABLET BY MOUTH EVERY 8 HOURS AS NEEDED FOR NAUSEA 20 Tab 0    DULoxetine (CYMBALTA) 30 mg capsule TAKE 1 CAPSULE BY MOUTH ONCE DAILY AT BEDTIME FOR 7 DAYS, THEN INCREASE TO 2 CAPSULES BY MOUTH ONCE DAILY AT BEDTIME THEREAFTER 60 Cap 2    aspirin delayed-release 325 mg tablet Take 1 Tab by mouth daily.  atorvastatin (LIPITOR) 40 mg tablet Take 1 Tab by mouth daily. 90 Tab 3    Liraglutide (VICTOZA) 0.6 mg/0.1 mL (18 mg/3 mL) pnij 1.8 mg by SubCUTAneous route daily. 10 Pen 0    clotrimazole (LOTRIMIN) 1 % topical cream Apply  to affected area two (2) times a day. 45 g 0    DULoxetine (CYMBALTA) 30 mg capsule Take 30 mg by mouth two (2) times a day.  lisinopril (PRINIVIL, ZESTRIL) 10 mg tablet TAKE 1 TABLET BY MOUTH ONCE DAILY 90 Tab 0    carvedilol (COREG) 12.5 mg tablet TAKE 1 TABLET BY MOUTH TWICE DAILY WITH MEALS 180 Tab 0    gabapentin (NEURONTIN) 300 mg capsule Take 300 mg by mouth daily.       mometasone (ELOCON) 0.1 % lotion Apply to scalp. Let sit 15 min before rinsing. Use daily x 1 week. 60 mL 0    glipiZIDE (GLUCOTROL) 10 mg tablet TAKE ONE TABLET BY MOUTH TWICE DAILY 180 Tab 0    umeclidinium (INCRUSE ELLIPTA) 62.5 mcg/actuation inhaler Take 1 Puff by inhalation daily. 1 Inhaler 1    diazePAM (VALIUM) 5 mg tablet Take 5 mg by mouth every six (6) hours as needed for Anxiety.  ferrous sulfate (IRON) 325 mg (65 mg iron) tablet Take 325 mg by mouth Daily (before breakfast). Indications: Iron Deficiency Anemia      alcohol swabs padm Test bs daily. E11.65 300 Pad 3    Blood Glucose Control High&Low (ACCU-CHEK KEVIN CONTROL SOLN) soln Use as directed. E11.65 3 mL 0    Blood-Glucose Meter (ACCU-CHEK KEVIN PLUS METER) misc Check blood glucose twice daily and as needed. E11.49 1 Each 0    glucose blood VI test strips (ACCU-CHEK KEVIN PLUS TEST STRP) strip Check blood glucose twice daily and as needed. E11.49 200 Strip 3    alendronate (FOSAMAX) 70 mg tablet Take 1 Tab by mouth every seven (7) days. 30 Tab 1    clotrimazole-betamethasone (LOTRISONE) topical cream Apply bid to affected area (pea-sized amount) 15 g 0    clopidogrel (PLAVIX) 75 mg tab Take 1 Tab by mouth daily. (Patient taking differently: Take 150 mg by mouth two (2) times a day.) 90 Tab 3    ipratropium (ATROVENT) 0.02 % nebulizer solution 2.5 mL by Nebulization route every four (4) hours (while awake). Indications: PREVENTION OF BRONCHOSPASM WITH CHRONIC BRONCHITIS 60 mL 1    albuterol (PROVENTIL HFA, VENTOLIN HFA, PROAIR HFA) 90 mcg/actuation inhaler Take 2 Puffs by inhalation every four (4) hours as needed for Wheezing. Indications: Chronic Obstructive Pulmonary Disease 1 Inhaler 1    insulin syringe-needle U-100 Dispense ultrafine 0.5 mL syringes with 31 gauge needle 100 Syringe 11    HYDROcodone-acetaminophen (NORCO) 5-325 mg per tablet Take 1 Tab by mouth two (2) times daily as needed for Pain. Max Daily Amount: 2 Tabs.  Indications: chronic pain 42 Tab 0     No current facility-administered medications on file prior to visit. Allergies   Allergen Reactions    Percocet [Oxycodone-Acetaminophen] Rash and Itching     Can Take Percocet but must take Benadryl for itching     Perfume Ht52 Rash     Perfume specific:  MUSK    Pravastatin Itching     Not sure, pt denies       Objective:  Visit Vitals  BP (!) 140/98 (BP 1 Location: Right arm, BP Patient Position: Sitting)   Pulse 83   Temp 97.7 °F (36.5 °C) (Oral)   Resp 20   Ht 5' (1.524 m)   Wt 180 lb 9.6 oz (81.9 kg)   SpO2 96%   BMI 35.27 kg/m²      Constitutional: Well developed, nourished, no distress, alert, obese habitus   HENT: Exterior ears and tympanic membranes normal bilaterally. Supple neck. No thyromegaly or lymphadenopathy. Oropharynx clear and moist mucous membranes. Normal inferior turbinates. Septum midline. Eyes: Conjunctiva normal. PERRL. Eyelids normal.   CV: S1, S2.  RRR. No murmurs/rubs. No thrills palpated. No carotid bruits. Intact distal pulses. No edema. No aortic bruits. Pulm: No abnormalities on inspection. Clear to auscultation bilaterally. No wheezing/rhonchi. Normal effort. GI: Soft, nontender, nondistended. Normal active bowel sounds. MS: Gait normal.  Joints without deformity/tenderness. Strength intact bilateral upper and lower ext. Normal ROM all extremities. Neuro: A/O x 3. No focal motor or sensory deficits. Speech normal.   Skin: No lesions/rashes on inspection. Psych: Appropriate affect, judgement and insight. Short-term memory intact.

## 2018-11-16 NOTE — PROGRESS NOTES
Quinton Charlton is a 80 y.o. female (: 1950) presenting to address:    Chief Complaint   Patient presents with    Abdominal Pain     ED follow up    Constipation       Vitals:    18 1415   BP: (!) 140/98   Pulse: 83   Resp: 20   Temp: 97.7 °F (36.5 °C)   TempSrc: Oral   SpO2: 96%   Weight: 180 lb 9.6 oz (81.9 kg)   Height: 5' (1.524 m)   PainSc:   4   PainLoc: Abdomen       Learning Assessment:     Learning Assessment 10/22/2015   PRIMARY LEARNER Patient   HIGHEST LEVEL OF EDUCATION - PRIMARY LEARNER  -   BARRIERS PRIMARY LEARNER -   CO-LEARNER CAREGIVER -   PRIMARY LANGUAGE ENGLISH   LEARNER PREFERENCE PRIMARY READING   ANSWERED BY pt   RELATIONSHIP SELF     Depression Screening:     PHQ over the last two weeks 2018   PHQ Not Done Active Diagnosis of Depression or Bipolar Disorder   Little interest or pleasure in doing things -   Feeling down, depressed, irritable, or hopeless -   Total Score PHQ 2 -   Trouble falling or staying asleep, or sleeping too much -   Feeling tired or having little energy -   Poor appetite, weight loss, or overeating -   Feeling bad about yourself - or that you are a failure or have let yourself or your family down -   Trouble concentrating on things such as school, work, reading, or watching TV -   Moving or speaking so slowly that other people could have noticed; or the opposite being so fidgety that others notice -   Thoughts of being better off dead, or hurting yourself in some way -   PHQ 9 Score -   How difficult have these problems made it for you to do your work, take care of your home and get along with others -     Fall Risk Assessment:     Fall Risk Assessment, last 12 mths 2018   Able to walk? Yes   Fall in past 12 months? No   Fall with injury? -   Number of falls in past 12 months -   Fall Risk Score -     Abuse Screening:     Abuse Screening Questionnaire 2018   Do you ever feel afraid of your partner?  N   Are you in a relationship with someone who physically or mentally threatens you? N   Is it safe for you to go home? Y     Coordination of Care Questionaire:   1. Have you been to the ER, urgent care clinic since your last visit? Hospitalized since your last visit? YES [facility]-ED 11/4/18.11/9/18 [facility] 11/12/18    2. Have you seen or consulted any other health care providers outside of the [facility] since your last visit? Include any pap smears or colon screening. NO    Advanced Directive:   1. Do you have an Advanced Directive? YES    2. Would you like information on Advanced Directives?  NO

## 2018-12-05 RX ORDER — GLIPIZIDE 10 MG/1
TABLET ORAL
Qty: 180 TAB | Refills: 0 | Status: SHIPPED | OUTPATIENT
Start: 2018-12-05 | End: 2019-03-27 | Stop reason: SDUPTHER

## 2018-12-17 NOTE — TELEPHONE ENCOUNTER
Patient called stating she has a colo 1/17/2019 with Dr. Liz Horn but she is having bad abdominal pain today. She states [facility] has not called her back. When asked ,she states she may be constipated. Asking what she can take. States she take Clear Lax and had some Zofran from the ED. The patient states she had regular formed stool today and 3 days ago. When asked again about constipation and abdominal pain she says that is not really the issue but more the nausea. Pt denies having the phenegran (written 11/16/18) at home, didn't  from [facility]. Called [facility], confirmed pickup on 11/16/18. Returned pt call. She will look for promethazine and call back if she cannot find it. Pt also was advised that per the 11/16/18 note from pcp she can take the mag citrate. Pt verbalized understanding.

## 2018-12-18 ENCOUNTER — TELEPHONE (OUTPATIENT)
Dept: FAMILY MEDICINE CLINIC | Age: 68
End: 2018-12-18

## 2018-12-18 RX ORDER — PROMETHAZINE HYDROCHLORIDE 25 MG/1
TABLET ORAL
Qty: 30 TAB | Refills: 0 | Status: SHIPPED | OUTPATIENT
Start: 2018-12-18 | End: 2019-09-16 | Stop reason: ALTCHOICE

## 2018-12-18 NOTE — TELEPHONE ENCOUNTER
Nereida Forbes nurse called from pt residence. Pt's promethazine is on prior auth. Nurse is asking if something else can be sent for her. Prior auth started for samanta Rodriguez, approved. Pt aware.

## 2018-12-26 ENCOUNTER — TELEPHONE (OUTPATIENT)
Dept: FAMILY MEDICINE CLINIC | Age: 68
End: 2018-12-26

## 2018-12-26 ENCOUNTER — OFFICE VISIT (OUTPATIENT)
Dept: CARDIOLOGY CLINIC | Age: 68
End: 2018-12-26

## 2018-12-26 DIAGNOSIS — Z45.09 ENCOUNTER FOR LOOP RECORDER CHECK: ICD-10-CM

## 2018-12-26 DIAGNOSIS — I63.432 CEREBROVASCULAR ACCIDENT (CVA) DUE TO EMBOLISM OF LEFT POSTERIOR CEREBRAL ARTERY (HCC): Primary | ICD-10-CM

## 2018-12-26 NOTE — TELEPHONE ENCOUNTER
Pt called saying that she has a colonoscopy scheduled next month with a Dr. Nessa Sampson. Pt states that she got a call from Dr. Nessa Sampson saying that she had to wait until March to do colonoscopy because \"someone from Dr. Anne Gentile office told Dr. Nessa Sampson that I couldn't get it done until March. \" Pt is wondering why she has to wait until March. She verbalized that she has been having severe abdominal pains and wants the colonoscopy done ASAP.  Pt wants Dr. Ivania Santana or Mark Frazier to give her a call back @ [phone]

## 2018-12-27 NOTE — TELEPHONE ENCOUNTER
Did not see mention of needing to postpone colo until March in patients chart. Called Dr. Leatha Clarke office for more information. Per patients record there it shows patient had recent stroke and provider would like to wait until March to have done due to this. Also routine colo not due until 10/2019 however insurance may still cover due to new symptoms. Will call to advise patient provider would like her to wait until March. Patient has been advised of this. She stated that she is still having a lot of pain and taking all the medications she was instructed to. Would like another option on what she can do. Please advise.

## 2018-12-31 NOTE — TELEPHONE ENCOUNTER
Can't do colo b/c she can't come off antiplatelet meds that she needs due to her recent large stroke. She should f/u with GI for sx. Sometimes they can do it on antiplatelets.   But that is up to GI

## 2019-01-09 NOTE — TELEPHONE ENCOUNTER
Pt called this morning crying in pain. Dr ross explained about why she couldn't have the colo right now. Pt says she does not want to go on. She was sobbing. I asked if she has seen GI or gone to ED. She says Linda does nothing. I asked if her  was home. No, he's working. I called spouse on other line. He said she is in a lot of pain. He said Dr Sena Ewing called in a stool test and labs to [facility] Friday 1/4/19. They said she had some type of infection and gave her medicine there to treat it. I stressed to him the importance of taking her to the ED immediately. He said he would leave work and take her. He asked that she call him. I returned to the pt call. She was still sobbing loudly. I asked her to call her spouse but also he was on his way home to take her the ED.

## 2019-01-09 NOTE — TELEPHONE ENCOUNTER
Has had 3 CT abd/pelvis and 2 xrays of abdomen since 11/2018 (in past 2 mos). Mesenteric pvls were also nml. Nothing acute has been shown. Additionally had UTI which was treated with bactrim. I'd recommend f/u in office- she is due for f/u A1c anyway. Can certainly prescribe antispasmodic like bentyl, but needs ov. Also need to work on getting sugars controlled b/c this could be contributing to sx. Bring bs log to next visit. Also ensure she is not constipated as this can cause abd pain.

## 2019-01-15 ENCOUNTER — TELEPHONE (OUTPATIENT)
Dept: FAMILY MEDICINE CLINIC | Age: 69
End: 2019-01-15

## 2019-01-15 ENCOUNTER — OFFICE VISIT (OUTPATIENT)
Dept: FAMILY MEDICINE CLINIC | Age: 69
End: 2019-01-15

## 2019-01-15 VITALS
OXYGEN SATURATION: 98 % | BODY MASS INDEX: 33.38 KG/M2 | HEART RATE: 89 BPM | HEIGHT: 60 IN | SYSTOLIC BLOOD PRESSURE: 150 MMHG | WEIGHT: 170 LBS | RESPIRATION RATE: 20 BRPM | TEMPERATURE: 97.6 F | DIASTOLIC BLOOD PRESSURE: 90 MMHG

## 2019-01-15 DIAGNOSIS — E11.49 TYPE 2 DIABETES MELLITUS WITH OTHER NEUROLOGIC COMPLICATION, WITH LONG-TERM CURRENT USE OF INSULIN (HCC): ICD-10-CM

## 2019-01-15 DIAGNOSIS — Z79.4 TYPE 2 DIABETES MELLITUS WITH OTHER NEUROLOGIC COMPLICATION, WITH LONG-TERM CURRENT USE OF INSULIN (HCC): ICD-10-CM

## 2019-01-15 DIAGNOSIS — I25.10 CORONARY ARTERY DISEASE INVOLVING NATIVE HEART WITHOUT ANGINA PECTORIS, UNSPECIFIED VESSEL OR LESION TYPE: ICD-10-CM

## 2019-01-15 DIAGNOSIS — R10.84 GENERALIZED ABDOMINAL PAIN: Primary | ICD-10-CM

## 2019-01-15 DIAGNOSIS — I10 ESSENTIAL HYPERTENSION: ICD-10-CM

## 2019-01-15 PROBLEM — Z91.199 NONCOMPLIANCE: Status: ACTIVE | Noted: 2019-01-15

## 2019-01-15 PROBLEM — M53.3 SACRAL PAIN: Status: RESOLVED | Noted: 2017-07-07 | Resolved: 2019-01-15

## 2019-01-15 PROBLEM — M53.3 SACROILIAC JOINT PAIN: Status: RESOLVED | Noted: 2018-01-09 | Resolved: 2019-01-15

## 2019-01-15 RX ORDER — LISINOPRIL 10 MG/1
TABLET ORAL
Qty: 90 TAB | Refills: 0 | Status: SHIPPED | OUTPATIENT
Start: 2019-01-15 | End: 2019-04-02 | Stop reason: SDUPTHER

## 2019-01-15 RX ORDER — CARVEDILOL 12.5 MG/1
TABLET ORAL
Qty: 180 TAB | Refills: 0 | Status: SHIPPED | OUTPATIENT
Start: 2019-01-15 | End: 2019-04-02 | Stop reason: SDUPTHER

## 2019-01-15 RX ORDER — PANTOPRAZOLE SODIUM 40 MG/1
40 TABLET, DELAYED RELEASE ORAL DAILY
Qty: 90 TAB | Refills: 0 | Status: SHIPPED | OUTPATIENT
Start: 2019-01-15 | End: 2019-03-14 | Stop reason: ALTCHOICE

## 2019-01-15 RX ORDER — INSULIN GLARGINE 100 [IU]/ML
80 INJECTION, SOLUTION SUBCUTANEOUS DAILY
Qty: 10 VIAL | Refills: 0 | Status: SHIPPED | OUTPATIENT
Start: 2019-01-15 | End: 2019-09-16 | Stop reason: SDUPTHER

## 2019-01-15 RX ORDER — INSULIN ASPART 100 [IU]/ML
25 INJECTION, SOLUTION INTRAVENOUS; SUBCUTANEOUS
Qty: 80 ML | Refills: 0 | Status: SHIPPED | OUTPATIENT
Start: 2019-01-15 | End: 2019-09-16 | Stop reason: SDUPTHER

## 2019-01-15 NOTE — PROGRESS NOTES
Assessment/Plan: 1. Generalized abdominal pain- ddx includes gastroparesis vs adhesions.   
-ck GES. Start PPI. Defer colo for 6mos after stroke to try to minimize stroke risk. May likely need endoscopy at the same time.  
- [address]; Future 2. Type 2 diabetes mellitus with other neurologic complication, with long-term current use of insulin (HCC) 
-incr lantus to 80 units. Bring bs log to next visit. I strongly doubt compliance with regimen. She continues to fail to bring in bs log.   
- [facility]; Future 
- insulin glargine (LANTUS) 100 unit/mL injection; 80 Units by SubCUTAneous route daily. Dispense: 10 Vial; Refill: 0 
- insulin aspart U-100 (NOVOLOG) 100 unit/mL injection; 25 Units by SubCUTAneous route Before breakfast, lunch, and dinner. pens  Dispense: 80 mL; Refill: 0 
 
3. Coronary artery disease involving native heart without angina pectoris, unspecified vessel or lesion type 
-refilled 
- carvedilol (COREG) 12.5 mg tablet; TAKE 1 TABLET BY MOUTH TWICE DAILY WITH MEALS  Dispense: 180 Tab; Refill: 0 The plan was discussed with the patient. The patient verbalized understanding and is in agreement with the plan. All medication potential side effects were discussed with the patient. Health Maintenance:  
Health Maintenance Topic Date Due  Shingrix Vaccine Age 50> (1 of 2) 04/07/2001  
 FOOT EXAM Q1  02/14/2019  MICROALBUMIN Q1  02/28/2019  LIPID PANEL Q1  02/28/2019  
 HEMOGLOBIN A1C Q6M  04/19/2019  MEDICARE YEARLY EXAM  08/09/2019  BREAST CANCER SCRN MAMMOGRAM  08/11/2019  GLAUCOMA SCREENING Q2Y  10/05/2019  
 EYE EXAM RETINAL OR DILATED  10/05/2019  COLONOSCOPY  10/06/2019  
 DTaP/Tdap/Td series (2 - Td) 07/26/2026  Hepatitis C Screening  Completed  Bone Densitometry (Dexa) Screening  Completed  Pneumococcal 65+ Low/Medium Risk  Completed  Influenza Age 5 to Adult  Completed Liliana Izquierdo is a 80 y.o. female and presents with ED Follow-up and Medication Refill Subjective: 
Pt has been to ER numerous times. Mesenteric PVL neg, several CT scans have been neg. The last CT did mention cecum is in same place and this could suggest adhesions. She describes periumbilical abd pain, vomiting. She is unable to tell me how long episodes last.   states the episodes can last hours. Worse in the am, per  and will happen upon awakening (not necessarily triggered by eating). The last few days have been better b/c of nausea meds. No early satiety. Denies constipation. HTN - bp uncontrolled. I'm not convinced pt is compliant with dosing of meds (she should have ran out of some meds) DM2- uncontrolled. bs running 200s in am.   She states she's compliant with meds. ROS: 
Constitutional: No recent weight change. No weakness/fatigue. No f/c. Cardiovascular: No CP/palpitations. No NINA/orthopnea/PND. Respiratory: No cough/sputum, dyspnea, wheezing. Gastointestinal: No dysphagia, reflux.  + n/v. No constipation/diarrhea. No melena/rectal bleeding. The problem list was updated as a part of today's visit. Patient Active Problem List  
Diagnosis Code  Vitamin D deficiency E55.9  
 Hx of breast cancer Z85.3  Chronic pain syndrome G89.4  Osteoarthrosis, unspecified whether generalized or localized, unspecified site Q39.55  
 Diastolic congestive heart failure (HCC) I50.30  
 COPD (chronic obstructive pulmonary disease) (HCC) J44.9  GERD (gastroesophageal reflux disease) K21.9  Tobacco dependence F17.200  Chronic radicular lumbar pain M54.16, G89.29  Scoliosis M41.9  Essential hypertension I10  Pure hypercholesterolemia E78.00  CAD (coronary artery disease) I25.10  Spinal stenosis of lumbar region with neurogenic claudication M48.062  
 S/P lumbar fusion Z98.1  Osteoporosis M81.0  Hemianopsia H53.47  
  Reactive depression F32.9  Stenosis of left carotid artery I65.22  
 Carpal tunnel syndrome of right wrist G56.01  
 Sacral pain M53.3  Type 2 diabetes mellitus with neurologic complication, with long-term current use of insulin (HCC) E11.49, Z79.4  History of compression fracture of spine Z87.81  
 History of stroke Z86.73  Left lumbar radiculopathy M54.16  
 Sacroiliac joint pain M53.3  Stage 3 chronic kidney disease (HCC) N18.3  Diabetic nephropathy associated with type 2 diabetes mellitus ([facility] [address] E11.21  
 Full code status Z78.9  Cerebrovascular accident (CVA) due to embolism of left posterior cerebral artery ([facility] [address] Y82.868 The PSH, FH were reviewed. SH: Social History Tobacco Use  Smoking status: Current Some Day Smoker Packs/day: 0.50 Years: 35.00 Pack years: 17.50 Types: Cigarettes Last attempt to quit: 2017 Years since quittin.9  Smokeless tobacco: Never Used Substance Use Topics  Alcohol use: No  
 Drug use: No  
 
 
Medications/Allergies: 
Current Outpatient Medications on File Prior to Visit Medication Sig Dispense Refill  promethazine (PHENERGAN) 25 mg tablet TAKE 1 TABLET BY MOUTH EVERY 8 HOURS AS NEEDED FOR NAUSEA 30 Tab 0  
 glipiZIDE (GLUCOTROL) 10 mg tablet TAKE 1 TABLET BY MOUTH TWICE DAILY 180 Tab 0  
 polyethylene glycol (MIRALAX) 17 gram packet Mix 1 packet (17g) into 4-8 ounces of beverage and drink once or twice daily. Results in 2-4 days, Max treatment length of 2 weeks.  metFORMIN (GLUCOPHAGE) 1,000 mg tablet TAKE 1 TABLET BY MOUTH TWICE DAILY WITH FOOD 180 Tab 0  
 dicyclomine (BENTYL) 10 mg capsule Take 1 Cap by mouth four (4) times daily as needed. 120 Cap 1  
 insulin glargine (LANTUS) 100 unit/mL injection 75 Units by SubCUTAneous route daily. 10 Vial 0  
 insulin aspart U-100 (NOVOLOG) 100 unit/mL injection 25 Units by SubCUTAneous route Before breakfast, lunch, and dinner.  pens 80 mL 0  
  DULoxetine (CYMBALTA) 30 mg capsule TAKE 1 CAPSULE BY MOUTH ONCE DAILY AT BEDTIME FOR 7 DAYS, THEN INCREASE TO 2 CAPSULES BY MOUTH ONCE DAILY AT BEDTIME THEREAFTER 60 Cap 2  
 aspirin delayed-release 325 mg tablet Take 1 Tab by mouth daily.  atorvastatin (LIPITOR) 40 mg tablet Take 1 Tab by mouth daily. 90 Tab 3  Liraglutide (VICTOZA) 0.6 mg/0.1 mL (18 mg/3 mL) pnij 1.8 mg by SubCUTAneous route daily. 10 Pen 0  
 clotrimazole (LOTRIMIN) 1 % topical cream Apply  to affected area two (2) times a day. 45 g 0  
 DULoxetine (CYMBALTA) 30 mg capsule Take 30 mg by mouth two (2) times a day.  carvedilol (COREG) 12.5 mg tablet TAKE 1 TABLET BY MOUTH TWICE DAILY WITH MEALS 180 Tab 0  
 gabapentin (NEURONTIN) 300 mg capsule Take 300 mg by mouth daily.  mometasone (ELOCON) 0.1 % lotion Apply to scalp. Let sit 15 min before rinsing. Use daily x 1 week. 60 mL 0  
 umeclidinium (INCRUSE ELLIPTA) 62.5 mcg/actuation inhaler Take 1 Puff by inhalation daily. 1 Inhaler 1  
 diazePAM (VALIUM) 5 mg tablet Take 5 mg by mouth every six (6) hours as needed for Anxiety.  ferrous sulfate (IRON) 325 mg (65 mg iron) tablet Take 325 mg by mouth Daily (before breakfast). Indications: Iron Deficiency Anemia  alcohol swabs padm Test bs daily. E11.65 300 Pad 3  Blood Glucose Control High&Low (ACCU-CHEK KEVIN CONTROL SOLN) soln Use as directed. E11.65 3 mL 0  Blood-Glucose Meter (ACCU-CHEK KEVIN PLUS METER) misc Check blood glucose twice daily and as needed. E11.49 1 Each 0  
 glucose blood VI test strips (ACCU-CHEK KEVIN PLUS TEST STRP) strip Check blood glucose twice daily and as needed. E11.49 200 Strip 3  
 alendronate (FOSAMAX) 70 mg tablet Take 1 Tab by mouth every seven (7) days. 30 Tab 1  clotrimazole-betamethasone (LOTRISONE) topical cream Apply bid to affected area (pea-sized amount) 15 g 0  
 clopidogrel (PLAVIX) 75 mg tab Take 1 Tab by mouth daily.  (Patient taking differently: Take 150 mg by mouth two (2) times a day.) 90 Tab 3  
 ipratropium (ATROVENT) 0.02 % nebulizer solution 2.5 mL by Nebulization route every four (4) hours (while awake). Indications: PREVENTION OF BRONCHOSPASM WITH CHRONIC BRONCHITIS 60 mL 1  
 albuterol (PROVENTIL HFA, VENTOLIN HFA, PROAIR HFA) 90 mcg/actuation inhaler Take 2 Puffs by inhalation every four (4) hours as needed for Wheezing. Indications: Chronic Obstructive Pulmonary Disease 1 Inhaler 1  
 insulin syringe-needle U-100 Dispense ultrafine 0.5 mL syringes with 31 gauge needle 100 Syringe 11 No current facility-administered medications on file prior to visit. Allergies Allergen Reactions  Percocet [Oxycodone-Acetaminophen] Rash and Itching Can Take Percocet but must take Benadryl for itching  Perfume Ht52 Rash Perfume specific:  MUSK  Pravastatin Itching Not sure, pt denies Objective: 
Visit Vitals /90 (BP 1 Location: Right arm, BP Patient Position: Sitting) Pulse 89 Temp 97.6 °F (36.4 °C) (Oral) Resp 20 Ht 5' (1.524 m) Wt 170 lb (77.1 kg) SpO2 98% BMI 33.20 kg/m² Constitutional: Well developed, nourished, no distress, alert, obese habitus CV: S1, S2.  RRR. No murmurs/rubs. No thrills palpated. No carotid bruits. Intact distal pulses. No edema. No aortic bruits. Pulm: No abnormalities on inspection. Clear to auscultation bilaterally. No wheezing/rhonchi. Normal effort. GI: Soft, nontender, nondistended. Normal active bowel sounds. Psych: Appropriate affect, poor judgement and insight. Short-term memory intact. Labwork and Ancillary Studies: CBC w/Diff Lab Results Component Value Date/Time WBC 7.1 03/06/2018 12:21 PM  
 HGB 11.4 (L) 03/06/2018 12:21 PM  
 PLATELET 651 99/86/1242 12:21 PM  
  
 
 Basic Metabolic Profile/LFTs Lab Results Component Value Date/Time  Sodium 135 (L) 03/06/2018 12:21 PM  
 Potassium 4.5 03/06/2018 12:21 PM  
 Chloride 103 03/06/2018 12:21 PM  
 CO2 24 03/06/2018 12:21 PM  
 Anion gap 8 03/06/2018 12:21 PM  
 Glucose 182 (H) 03/06/2018 12:21 PM  
 BUN 25 (H) 03/06/2018 12:21 PM  
 Creatinine 1.10 03/06/2018 12:21 PM  
 BUN/Creatinine ratio 23 (H) 03/06/2018 12:21 PM  
 GFR est AA >60 03/06/2018 12:21 PM  
 GFR est non-AA 50 (L) 03/06/2018 12:21 PM  
 Calcium 9.3 03/06/2018 12:21 PM  
  
Lab Results Component Value Date/Time ALT (SGPT) 23 03/06/2018 12:21 PM  
 AST (SGOT) 12 (L) 03/06/2018 12:21 PM  
 Alk. phosphatase 97 03/06/2018 12:21 PM  
 Bilirubin, direct <0.1 04/28/2016 03:42 AM  
 Bilirubin, total 0.2 03/06/2018 12:21 PM  
 
 
Cholesterol Lab Results Component Value Date/Time  Cholesterol, total 169 02/28/2018 12:00 AM  
 HDL Cholesterol 42 02/28/2018 12:00 AM  
 LDL, calculated 86 02/28/2018 12:00 AM  
 Triglyceride 207 (H) 02/28/2018 12:00 AM  
 CHOL/HDL Ratio 3.7 02/19/2017 05:01 AM

## 2019-01-15 NOTE — PROGRESS NOTES
Gonzalo Heaton is a 80 y.o. female (: 1950) presenting to address: Chief Complaint Patient presents with  ED Follow-up  Medication Refill Vitals:  
 01/15/19 1340 BP: 150/90 Pulse: 89 Resp: 20 Temp: 97.6 °F (36.4 °C) TempSrc: Oral  
SpO2: 98% Weight: 170 lb (77.1 kg) Height: 5' (1.524 m) PainSc:   4 PainLoc: Generalized Learning Assessment:  
 
Learning Assessment 10/22/2015 PRIMARY LEARNER Patient HIGHEST LEVEL OF EDUCATION - PRIMARY LEARNER  -  
BARRIERS PRIMARY LEARNER -  
CO-LEARNER CAREGIVER -  
PRIMARY LANGUAGE ENGLISH  
LEARNER PREFERENCE PRIMARY READING  
ANSWERED BY pt  
RELATIONSHIP SELF Depression Screening: PHQ over the last two weeks 2018 PHQ Not Done Active Diagnosis of Depression or Bipolar Disorder Little interest or pleasure in doing things - Feeling down, depressed, irritable, or hopeless - Total Score PHQ 2 - Trouble falling or staying asleep, or sleeping too much - Feeling tired or having little energy - Poor appetite, weight loss, or overeating - Feeling bad about yourself - or that you are a failure or have let yourself or your family down - Trouble concentrating on things such as school, work, reading, or watching TV - Moving or speaking so slowly that other people could have noticed; or the opposite being so fidgety that others notice - Thoughts of being better off dead, or hurting yourself in some way -  
PHQ 9 Score - How difficult have these problems made it for you to do your work, take care of your home and get along with others - Fall Risk Assessment:  
 
Fall Risk Assessment, last 12 mths 1/15/2019 Able to walk? Yes Fall in past 12 months? Yes Fall with injury? Yes  
Number of falls in past 12 months 3 Fall Risk Score 4 Abuse Screening:  
 
Abuse Screening Questionnaire 2018 Do you ever feel afraid of your partner?  Dayo Mercer  
 Are you in a relationship with someone who physically or mentally threatens you? Ava Busch Is it safe for you to go home? Fito Duenas Coordination of Care Questionaire: 1. Have you been to the ER, urgent care clinic since your last visit? Hospitalized since your last visit? [id number] ED 1/4/19 & 1/9/19 
 
2. Have you seen or consulted any other health care providers outside of the [address] [place] [address] since your last visit? Include any pap smears or colon screening. NO Advanced Directive: 1. Do you have an Advanced Directive? YES 
 
2. Would you like information on Advanced Directives?  NO

## 2019-01-23 ENCOUNTER — TELEPHONE (OUTPATIENT)
Dept: FAMILY MEDICINE CLINIC | Age: 69
End: 2019-01-23

## 2019-01-23 NOTE — TELEPHONE ENCOUNTER
Pt returned call. She says the pain is not improved yet. Ordered on 1/15/19, pt states she has only had 3-4 days worth. Per Dr Mary Moyer, pt should continue with ppi for at least 30 days. Pt will comply.

## 2019-01-24 RX ORDER — BLOOD-GLUCOSE METER
EACH MISCELLANEOUS
COMMUNITY
End: 2019-01-24 | Stop reason: SDUPTHER

## 2019-01-24 RX ORDER — BLOOD-GLUCOSE METER
EACH MISCELLANEOUS
Qty: 180 EACH | Refills: 2 | Status: SHIPPED | OUTPATIENT
Start: 2019-01-24 | End: 2019-04-15

## 2019-01-28 RX ORDER — LANCETS 33 GAUGE
EACH MISCELLANEOUS
COMMUNITY
End: 2019-01-28 | Stop reason: SDUPTHER

## 2019-01-28 RX ORDER — LANCETS 33 GAUGE
EACH MISCELLANEOUS
Qty: 180 LANCET | Refills: 3 | Status: SHIPPED | OUTPATIENT
Start: 2019-01-28 | End: 2019-04-15 | Stop reason: ALTCHOICE

## 2019-01-28 NOTE — PROGRESS NOTES
I have personally seen and evaluated the device findings. Interrogation reviewed and I agree with assessment.     Varsha Ortiz

## 2019-02-06 DIAGNOSIS — Z79.4 TYPE 2 DIABETES MELLITUS WITH OTHER NEUROLOGIC COMPLICATION, WITH LONG-TERM CURRENT USE OF INSULIN (HCC): ICD-10-CM

## 2019-02-06 DIAGNOSIS — R10.84 GENERALIZED ABDOMINAL PAIN: ICD-10-CM

## 2019-02-06 DIAGNOSIS — E11.49 TYPE 2 DIABETES MELLITUS WITH OTHER NEUROLOGIC COMPLICATION, WITH LONG-TERM CURRENT USE OF INSULIN (HCC): ICD-10-CM

## 2019-02-07 ENCOUNTER — TELEPHONE (OUTPATIENT)
Dept: CARDIOLOGY CLINIC | Age: 69
End: 2019-02-07

## 2019-02-07 NOTE — TELEPHONE ENCOUNTER
Verbal order and read back per Alison Caraballo MD  Patient had episode of afib show on event monitor.  She needs to have appt with him     Tried to reach patient, voicemail if full, could not leave message

## 2019-03-14 ENCOUNTER — OFFICE VISIT (OUTPATIENT)
Dept: FAMILY MEDICINE CLINIC | Age: 69
End: 2019-03-14

## 2019-03-14 VITALS
DIASTOLIC BLOOD PRESSURE: 78 MMHG | WEIGHT: 174 LBS | BODY MASS INDEX: 34.16 KG/M2 | TEMPERATURE: 97.8 F | HEIGHT: 60 IN | RESPIRATION RATE: 20 BRPM | SYSTOLIC BLOOD PRESSURE: 112 MMHG | HEART RATE: 97 BPM | OXYGEN SATURATION: 99 %

## 2019-03-14 DIAGNOSIS — E78.00 PURE HYPERCHOLESTEROLEMIA: ICD-10-CM

## 2019-03-14 DIAGNOSIS — E11.49 TYPE 2 DIABETES MELLITUS WITH OTHER NEUROLOGIC COMPLICATION, WITH LONG-TERM CURRENT USE OF INSULIN (HCC): ICD-10-CM

## 2019-03-14 DIAGNOSIS — N89.8 VAGINAL ITCHING: ICD-10-CM

## 2019-03-14 DIAGNOSIS — Z79.4 TYPE 2 DIABETES MELLITUS WITH OTHER NEUROLOGIC COMPLICATION, WITH LONG-TERM CURRENT USE OF INSULIN (HCC): ICD-10-CM

## 2019-03-14 DIAGNOSIS — B37.31 CANDIDAL VULVITIS: Primary | ICD-10-CM

## 2019-03-14 DIAGNOSIS — N18.30 STAGE 3 CHRONIC KIDNEY DISEASE (HCC): ICD-10-CM

## 2019-03-14 LAB
HBA1C MFR BLD HPLC: 10.9 %
WET MOUNT POCT, WMPOCT: NORMAL

## 2019-03-14 RX ORDER — DOXYLAMINE SUCCINATE 25 MG
TABLET ORAL 2 TIMES DAILY
Qty: 57 G | Refills: 0 | Status: SHIPPED | OUTPATIENT
Start: 2019-03-14 | End: 2019-03-24

## 2019-03-14 RX ORDER — CHOLECALCIFEROL (VITAMIN D3) 125 MCG
CAPSULE ORAL DAILY
COMMUNITY
End: 2019-04-02 | Stop reason: ALTCHOICE

## 2019-03-14 RX ORDER — PRAVASTATIN SODIUM 40 MG/1
40 TABLET ORAL
COMMUNITY
End: 2019-03-14 | Stop reason: ALTCHOICE

## 2019-03-14 RX ORDER — GABAPENTIN 100 MG/1
CAPSULE ORAL 3 TIMES DAILY
COMMUNITY
End: 2019-10-25 | Stop reason: ALTCHOICE

## 2019-03-14 NOTE — PROGRESS NOTES
Heiu Yeboah is a 80 y.o. female (: 1950) presenting to address:    Chief Complaint   Patient presents with    Vaginal Itching       Vitals:    19 1310   BP: 112/78   Pulse: 97   Resp: 20   Temp: 97.8 °F (36.6 °C)   TempSrc: Oral   SpO2: 99%   Weight: 174 lb (78.9 kg)   Height: 5' (1.524 m)   PainSc:   4   PainLoc: Back       Learning Assessment:     Learning Assessment 10/22/2015   PRIMARY LEARNER Patient   HIGHEST LEVEL OF EDUCATION - PRIMARY LEARNER  -   BARRIERS PRIMARY LEARNER -   CO-LEARNER CAREGIVER -   PRIMARY LANGUAGE ENGLISH   LEARNER PREFERENCE PRIMARY READING   ANSWERED BY pt   RELATIONSHIP SELF     Depression Screening:     3 most recent PHQ Screens 2018   PHQ Not Done Active Diagnosis of Depression or Bipolar Disorder   Little interest or pleasure in doing things -   Feeling down, depressed, irritable, or hopeless -   Total Score PHQ 2 -   Trouble falling or staying asleep, or sleeping too much -   Feeling tired or having little energy -   Poor appetite, weight loss, or overeating -   Feeling bad about yourself - or that you are a failure or have let yourself or your family down -   Trouble concentrating on things such as school, work, reading, or watching TV -   Moving or speaking so slowly that other people could have noticed; or the opposite being so fidgety that others notice -   Thoughts of being better off dead, or hurting yourself in some way -   PHQ 9 Score -   How difficult have these problems made it for you to do your work, take care of your home and get along with others -     Fall Risk Assessment:     Fall Risk Assessment, last 12 mths 1/15/2019   Able to walk? Yes   Fall in past 12 months? Yes   Fall with injury? Yes   Number of falls in past 12 months 3   Fall Risk Score 4     Abuse Screening:     Abuse Screening Questionnaire 2018   Do you ever feel afraid of your partner? N   Are you in a relationship with someone who physically or mentally threatens you?  Opal Downing Is it safe for you to go home? Y     Coordination of Care Questionaire:   1. Have you been to the ER, urgent care clinic since your last visit? Hospitalized since your last visit? NO    2. Have you seen or consulted any other health care providers outside of the [address] [place] [address] since your last visit? Include any pap smears or colon screening. NO    Advanced Directive:   1. Do you have an Advanced Directive? YES    2. Would you like information on Advanced Directives?  NO

## 2019-03-14 NOTE — PROGRESS NOTES
Chief Complaint   Patient presents with    Vaginal Itching       HPI:  80 y.o. female presents w/ c/o vaginal itching x 4 weeks. She has scratched so much it's started bleeding. No vaginal discharge. Patient declines STD testing. DM2 - bs running high. She didn't bring in a log. States she is compliant with insulin. ROS:  Feeling well. No dyspnea or chest pain on exertion. No abdominal pain, change in bowel habits, black or bloody stools. No urinary tract symptoms. No neurological complaints. GYN ROS:  no abnormal bleeding. OBJECTIVE:   Visit Vitals  /78 (BP 1 Location: Right arm, BP Patient Position: Sitting)   Pulse 97   Temp 97.8 °F (36.6 °C) (Oral)   Resp 20   Ht 5' (1.524 m)   Wt 174 lb (78.9 kg)   SpO2 99%   BMI 33.98 kg/m²     Gen: The patient appears well, alert, in no distress. Pulm: Lungs are clear, good air entry, no wheezes, rhonchi or rales. CV: S1 and S2 normal, no murmurs, regular rate and rhythm. No edema. Pelvic exam: VULVA: vulvar edema, excoriation and erythema. VAGINA:  Atrophic, no lesion. No blood in vaginal vault. Microscopic wet-mount exam shows KOH done, scant cellularity. ASSESSMENT/PLAN:     1. Candidal vulvitis  - miconazole (MICOTIN) 2 % topical cream; Apply  to affected area two (2) times a day for 10 days. Dispense: 57 g; Refill: 0    2. Vaginal itching  - AMB POC SMEAR, STAIN & INTERPRET, WET MOUNT    3. Type 2 diabetes mellitus with other neurologic complication, with long-term current use of insulin ([facility])  -A1c 10.9. Noncompliant with checking sugars.   - AMB POC HEMOGLOBIN A1C        The plan was discussed with the patient. The patient verbalized understanding and is in agreement with the plan. All medication potential side effects were discussed with the patient.

## 2019-03-27 RX ORDER — GLIPIZIDE 10 MG/1
TABLET ORAL
Qty: 180 TAB | Refills: 0 | Status: SHIPPED | OUTPATIENT
Start: 2019-03-27 | End: 2019-09-16 | Stop reason: SDUPTHER

## 2019-04-01 DIAGNOSIS — I25.10 CORONARY ARTERY DISEASE INVOLVING NATIVE HEART WITHOUT ANGINA PECTORIS, UNSPECIFIED VESSEL OR LESION TYPE: ICD-10-CM

## 2019-04-01 DIAGNOSIS — I10 ESSENTIAL HYPERTENSION: ICD-10-CM

## 2019-04-02 RX ORDER — CARVEDILOL 12.5 MG/1
TABLET ORAL
Qty: 180 TAB | Refills: 0 | Status: SHIPPED | OUTPATIENT
Start: 2019-04-02 | End: 2019-10-25 | Stop reason: SDUPTHER

## 2019-04-02 RX ORDER — LISINOPRIL 10 MG/1
TABLET ORAL
Qty: 90 TAB | Refills: 0 | Status: SHIPPED | OUTPATIENT
Start: 2019-04-02 | End: 2019-10-25 | Stop reason: SDUPTHER

## 2019-04-02 RX ORDER — PANTOPRAZOLE SODIUM 40 MG/1
TABLET, DELAYED RELEASE ORAL
Qty: 90 TAB | Refills: 0 | Status: SHIPPED | OUTPATIENT
Start: 2019-04-02 | End: 2019-10-25 | Stop reason: SDUPTHER

## 2019-04-02 RX ORDER — CLOPIDOGREL BISULFATE 75 MG/1
75 TABLET ORAL DAILY
Qty: 90 TAB | Refills: 3 | Status: SHIPPED | OUTPATIENT
Start: 2019-04-02 | End: 2019-08-26 | Stop reason: ALTCHOICE

## 2019-04-04 ENCOUNTER — OFFICE VISIT (OUTPATIENT)
Dept: FAMILY MEDICINE CLINIC | Age: 69
End: 2019-04-04

## 2019-04-04 VITALS
TEMPERATURE: 98.7 F | SYSTOLIC BLOOD PRESSURE: 140 MMHG | HEIGHT: 60 IN | BODY MASS INDEX: 34.2 KG/M2 | WEIGHT: 174.2 LBS | OXYGEN SATURATION: 97 % | HEART RATE: 110 BPM | RESPIRATION RATE: 20 BRPM | DIASTOLIC BLOOD PRESSURE: 68 MMHG

## 2019-04-04 DIAGNOSIS — I25.10 CORONARY ARTERY DISEASE INVOLVING NATIVE HEART WITHOUT ANGINA PECTORIS, UNSPECIFIED VESSEL OR LESION TYPE: ICD-10-CM

## 2019-04-04 DIAGNOSIS — I63.432 CEREBROVASCULAR ACCIDENT (CVA) DUE TO EMBOLISM OF LEFT POSTERIOR CEREBRAL ARTERY (HCC): ICD-10-CM

## 2019-04-04 DIAGNOSIS — I10 ESSENTIAL HYPERTENSION: Primary | ICD-10-CM

## 2019-04-04 DIAGNOSIS — Z79.4 TYPE 2 DIABETES MELLITUS WITH DIABETIC NEUROPATHY, WITH LONG-TERM CURRENT USE OF INSULIN (HCC): ICD-10-CM

## 2019-04-04 DIAGNOSIS — Z01.810 PREOP CARDIOVASCULAR EXAM: ICD-10-CM

## 2019-04-04 DIAGNOSIS — E11.40 TYPE 2 DIABETES MELLITUS WITH DIABETIC NEUROPATHY, WITH LONG-TERM CURRENT USE OF INSULIN (HCC): ICD-10-CM

## 2019-04-04 NOTE — PROGRESS NOTES
Pura Elliott is a 80 y.o. female (: 1950) presenting to address: Chief Complaint Patient presents with  Pre-op Exam  
 
 
Vitals:  
 19 1443 BP: 140/68 Pulse: (!) 110 Resp: 20 Temp: 98.7 °F (37.1 °C) TempSrc: Oral  
SpO2: 97% Weight: 174 lb 3.2 oz (79 kg) Height: 5' (1.524 m) PainSc:   4 PainLoc: Back Learning Assessment:  
 
Learning Assessment 10/22/2015 PRIMARY LEARNER Patient HIGHEST LEVEL OF EDUCATION - PRIMARY LEARNER  -  
BARRIERS PRIMARY LEARNER -  
CO-LEARNER CAREGIVER -  
PRIMARY LANGUAGE ENGLISH  
LEARNER PREFERENCE PRIMARY READING  
ANSWERED BY pt  
RELATIONSHIP SELF Depression Screening:  
 
3 most recent PHQ Screens 2019 PHQ Not Done Active Diagnosis of Depression or Bipolar Disorder Little interest or pleasure in doing things - Feeling down, depressed, irritable, or hopeless - Total Score PHQ 2 - Trouble falling or staying asleep, or sleeping too much - Feeling tired or having little energy - Poor appetite, weight loss, or overeating - Feeling bad about yourself - or that you are a failure or have let yourself or your family down - Trouble concentrating on things such as school, work, reading, or watching TV - Moving or speaking so slowly that other people could have noticed; or the opposite being so fidgety that others notice - Thoughts of being better off dead, or hurting yourself in some way -  
PHQ 9 Score - How difficult have these problems made it for you to do your work, take care of your home and get along with others - Fall Risk Assessment:  
 
Fall Risk Assessment, last 12 mths 1/15/2019 Able to walk? Yes Fall in past 12 months? Yes Fall with injury? Yes  
Number of falls in past 12 months 3 Fall Risk Score 4 Abuse Screening:  
 
Abuse Screening Questionnaire 2018 Do you ever feel afraid of your partner?  Are you in a relationship with someone who physically or mentally threatens you? Raz Aly Is it safe for you to go home? Nolan Prasad Coordination of Care Questionaire: 1. Have you been to the ER, urgent care clinic since your last visit? Hospitalized since your last visit? NO 
 
2. Have you seen or consulted any other health care providers outside of the [address] [place] [address] since your last visit? Include any pap smears or colon screening. NO Advanced Directive: 1. Do you have an Advanced Directive? YES 
 
2. Would you like information on Advanced Directives?  NO
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
## 2019-04-15 RX ORDER — ASPIRIN 81 MG/1
81 TABLET ORAL
COMMUNITY
End: 2019-08-26 | Stop reason: ALTCHOICE

## 2019-04-15 NOTE — PERIOP NOTES
PAT - SURGICAL PRE-ADMISSION INSTRUCTIONS 
 
NAME:  Sameer Irby                                                          TODAY'S DATE:  4/15/2019 SURGERY DATE:  4/17/2019                                  SURGERY ARRIVAL TIME:   1000 1. Do NOT eat or drink anything, including candy or gum, after MIDNIGHT on 04/16/2019 , unless you have specific instructions from your Surgeon or Anesthesia Provider to do so. 2. No smoking on the day of surgery. 3. No alcohol 24 hours prior to the day of surgery. 4. No recreational drugs for one week prior to the day of surgery. 5. Leave all valuables, including money/purse, at home. 6. Remove all jewelry, nail polish, makeup (including mascara); no lotions, powders, deodorant, or perfume/cologne/after shave. 7. Glasses/Contact lenses and Dentures may be worn to the hospital.  They will be removed prior to surgery. 8. Call your doctor if symptoms of a cold or illness develop within 24 ours prior to surgery. 9. AN ADULT MUST DRIVE YOU HOME AFTER OUTPATIENT SURGERY. 10. If you are having an OUTPATIENT procedure, please make arrangements for a responsible adult to be with you for 24 hours after your surgery. 11. If you are admitted to the hospital, you will be assigned to a bed after surgery is complete. Normally a family member will not be able to see you until you are in your assigned bed. 15. Family is encouraged to accompany you to the hospital.  We ask visitors in the treatment area to be limited to ONE person at a time to ensure patient privacy. EXCEPTIONS WILL BE MADE AS NEEDED. 15. Children under 12 are discouraged from entering the treatment area and need to be supervised by an adult when in the waiting room. Special Instructions: HOLD oral diabetic medication on the MORNING OF surgery. , Follow physician instructions about insulin. If NO instructions were given, take your usual dose of BASAL insulin the night BEFORE surgery. , Complete bowel prep per MD instructions., STOP anticoagulants AT LEAST 1 WEEK PRIOR to your surgery or, follow other MD instructions:  Stopped Plavix on 04/13/2019 Patient Prep: These surgical instructions were reviewed with Xochitl Dennison during the PAT phone call. Directions: On the morning of surgery, please go to the [address] [place] [address]. Enter the building from the [facility] entrance, 1st floor (next to the Emergency Room entrance). Take the elevator to the 2nd floor. Sign in at the Registration Desk. If you have any questions and/or concerns, please do not hesitate to call: 
(Prior to the day of surgery)  [facility] [place] unit:  [phone] (Day of surgery)  [facility] unit:  [phone]

## 2019-04-16 ENCOUNTER — ANESTHESIA EVENT (OUTPATIENT)
Dept: ENDOSCOPY | Age: 69
End: 2019-04-16
Payer: MEDICARE

## 2019-04-17 ENCOUNTER — HOSPITAL ENCOUNTER (OUTPATIENT)
Age: 69
Setting detail: OUTPATIENT SURGERY
Discharge: HOME | End: 2019-04-17
Attending: INTERNAL MEDICINE | Admitting: INTERNAL MEDICINE
Payer: MEDICARE

## 2019-04-17 ENCOUNTER — ANESTHESIA (OUTPATIENT)
Dept: ENDOSCOPY | Age: 69
End: 2019-04-17
Payer: MEDICARE

## 2019-04-17 VITALS
SYSTOLIC BLOOD PRESSURE: 71 MMHG | OXYGEN SATURATION: 99 % | RESPIRATION RATE: 16 BRPM | WEIGHT: 172 LBS | HEIGHT: 60 IN | TEMPERATURE: 98 F | BODY MASS INDEX: 33.77 KG/M2 | DIASTOLIC BLOOD PRESSURE: 56 MMHG | HEART RATE: 85 BPM

## 2019-04-17 PROBLEM — K59.1 FUNCTIONAL DIARRHEA: Chronic | Status: ACTIVE | Noted: 2019-04-17

## 2019-04-17 LAB
GLUCOSE BLD STRIP.AUTO-MCNC: 265 MG/DL (ref 70–110)
GLUCOSE BLD STRIP.AUTO-MCNC: 282 MG/DL (ref 70–110)
GLUCOSE BLD STRIP.AUTO-MCNC: 296 MG/DL (ref 70–110)
GLUCOSE BLD STRIP.AUTO-MCNC: 326 MG/DL (ref 70–110)

## 2019-04-17 PROCEDURE — 74011250636 HC RX REV CODE- 250/636: Performed by: NURSE ANESTHETIST, CERTIFIED REGISTERED

## 2019-04-17 PROCEDURE — 74011636637 HC RX REV CODE- 636/637: Performed by: ANESTHESIOLOGY

## 2019-04-17 PROCEDURE — 88305 TISSUE EXAM BY PATHOLOGIST: CPT

## 2019-04-17 PROCEDURE — 76060000032 HC ANESTHESIA 0.5 TO 1 HR: Performed by: INTERNAL MEDICINE

## 2019-04-17 PROCEDURE — 77030009426 HC FCPS BIOP ENDOSC BSC -B: Performed by: INTERNAL MEDICINE

## 2019-04-17 PROCEDURE — 74011636637 HC RX REV CODE- 636/637: Performed by: NURSE ANESTHETIST, CERTIFIED REGISTERED

## 2019-04-17 PROCEDURE — 77030031670 HC DEV INFL ENDOTEK BIG60 MRTM -B: Performed by: INTERNAL MEDICINE

## 2019-04-17 PROCEDURE — 74011250636 HC RX REV CODE- 250/636

## 2019-04-17 PROCEDURE — 88112 CYTOPATH CELL ENHANCE TECH: CPT

## 2019-04-17 PROCEDURE — 82962 GLUCOSE BLOOD TEST: CPT

## 2019-04-17 PROCEDURE — 76040000007: Performed by: INTERNAL MEDICINE

## 2019-04-17 RX ORDER — SODIUM CHLORIDE, SODIUM LACTATE, POTASSIUM CHLORIDE, CALCIUM CHLORIDE 600; 310; 30; 20 MG/100ML; MG/100ML; MG/100ML; MG/100ML
25 INJECTION, SOLUTION INTRAVENOUS CONTINUOUS
Status: DISCONTINUED | OUTPATIENT
Start: 2019-04-17 | End: 2019-04-17 | Stop reason: HOSPADM

## 2019-04-17 RX ORDER — SODIUM CHLORIDE 0.9 % (FLUSH) 0.9 %
5-40 SYRINGE (ML) INJECTION AS NEEDED
Status: CANCELLED | OUTPATIENT
Start: 2019-04-17

## 2019-04-17 RX ORDER — INSULIN LISPRO 100 [IU]/ML
INJECTION, SOLUTION INTRAVENOUS; SUBCUTANEOUS ONCE
Status: COMPLETED | OUTPATIENT
Start: 2019-04-17 | End: 2019-04-17

## 2019-04-17 RX ORDER — SODIUM CHLORIDE 0.9 % (FLUSH) 0.9 %
5-40 SYRINGE (ML) INJECTION EVERY 8 HOURS
Status: CANCELLED | OUTPATIENT
Start: 2019-04-17

## 2019-04-17 RX ORDER — FAMOTIDINE 20 MG/1
20 TABLET, FILM COATED ORAL ONCE
Status: DISCONTINUED | OUTPATIENT
Start: 2019-04-17 | End: 2019-04-17 | Stop reason: HOSPADM

## 2019-04-17 RX ORDER — LIDOCAINE HYDROCHLORIDE 20 MG/ML
INJECTION, SOLUTION EPIDURAL; INFILTRATION; INTRACAUDAL; PERINEURAL AS NEEDED
Status: DISCONTINUED | OUTPATIENT
Start: 2019-04-17 | End: 2019-04-17 | Stop reason: HOSPADM

## 2019-04-17 RX ORDER — SODIUM CHLORIDE 9 MG/ML
25 INJECTION, SOLUTION INTRAVENOUS CONTINUOUS
Status: CANCELLED | OUTPATIENT
Start: 2019-04-17 | End: 2019-04-17

## 2019-04-17 RX ORDER — PHENYLEPHRINE HCL IN 0.9% NACL 1 MG/10 ML
SYRINGE (ML) INTRAVENOUS AS NEEDED
Status: DISCONTINUED | OUTPATIENT
Start: 2019-04-17 | End: 2019-04-17 | Stop reason: HOSPADM

## 2019-04-17 RX ORDER — PROPOFOL 10 MG/ML
INJECTION, EMULSION INTRAVENOUS AS NEEDED
Status: DISCONTINUED | OUTPATIENT
Start: 2019-04-17 | End: 2019-04-17 | Stop reason: HOSPADM

## 2019-04-17 RX ADMIN — Medication 100 MCG: at 12:16

## 2019-04-17 RX ADMIN — PROPOFOL 20 MG: 10 INJECTION, EMULSION INTRAVENOUS at 11:57

## 2019-04-17 RX ADMIN — PROPOFOL 60 MG: 10 INJECTION, EMULSION INTRAVENOUS at 11:48

## 2019-04-17 RX ADMIN — INSULIN LISPRO 12 UNITS: 100 INJECTION, SOLUTION INTRAVENOUS; SUBCUTANEOUS at 11:22

## 2019-04-17 RX ADMIN — PROPOFOL 20 MG: 10 INJECTION, EMULSION INTRAVENOUS at 11:59

## 2019-04-17 RX ADMIN — PROPOFOL 20 MG: 10 INJECTION, EMULSION INTRAVENOUS at 12:07

## 2019-04-17 RX ADMIN — PROPOFOL 40 MG: 10 INJECTION, EMULSION INTRAVENOUS at 11:51

## 2019-04-17 RX ADMIN — PROPOFOL 40 MG: 10 INJECTION, EMULSION INTRAVENOUS at 11:54

## 2019-04-17 RX ADMIN — SODIUM CHLORIDE, SODIUM LACTATE, POTASSIUM CHLORIDE, AND CALCIUM CHLORIDE: 600; 310; 30; 20 INJECTION, SOLUTION INTRAVENOUS at 11:46

## 2019-04-17 RX ADMIN — SODIUM CHLORIDE, SODIUM LACTATE, POTASSIUM CHLORIDE, AND CALCIUM CHLORIDE 25 ML/HR: 600; 310; 30; 20 INJECTION, SOLUTION INTRAVENOUS at 11:09

## 2019-04-17 RX ADMIN — PROPOFOL 20 MG: 10 INJECTION, EMULSION INTRAVENOUS at 12:01

## 2019-04-17 RX ADMIN — INSULIN LISPRO 9 UNITS: 100 INJECTION, SOLUTION INTRAVENOUS; SUBCUTANEOUS at 12:55

## 2019-04-17 RX ADMIN — PROPOFOL 20 MG: 10 INJECTION, EMULSION INTRAVENOUS at 12:04

## 2019-04-17 RX ADMIN — PROPOFOL 20 MG: 10 INJECTION, EMULSION INTRAVENOUS at 11:55

## 2019-04-17 RX ADMIN — LIDOCAINE HYDROCHLORIDE 40 MG: 20 INJECTION, SOLUTION EPIDURAL; INFILTRATION; INTRACAUDAL; PERINEURAL at 11:48

## 2019-04-17 NOTE — PROCEDURES
EGD Procedure Note    Patient: Sameer Irby MRN: [id number]  SSN: [id number]    YOB: 1950  Age: 77 y.o. Sex: female      Date of Procedure: 4/17/2019      Procedures:  EGD :    HISTORY UPDATE: History and physical has been reviewed. The patient has been examined. There have been no significant clinical changes since the completion of the originally dated History and Physical.    INDICATION:  Nausea and Vomiting    PROCEDURE PERFORMED: EGD    ENDOSCOPIST: Anam Rahman MD    ASSISTANT:  Endoscopy Technician-Niall Gillis  Pre Op Nurse: Rebecca Smith RN    CLASSIFICATION OF PREOPERATIVE RISK: ASA 3 - Patient with moderate systemic disease with functional limitations    ANESTHESIA:  MAC anesthesia    ENDOSCOPE: [id number]                                                                                                              EXTENT OF EXAM: Second portion of the duodenum      DESCRIPTION OF PROCEDURE:   The procedure was discussed with the patient including purpose, risks, benefits and alternatives including but not limited to IV conscious sedation, bleeding, perforation and aspiration and the consent form was signed and witnessed. A safety timeout was performed. Procedure done with MAC. The patients vital signs were monitored at all times including heart rate and rhythm, oxygen saturation, and blood pressure. The patient was then placed into the left lateral decubitus position. The Olympus adult diagnostic endoscope was then passed under direct visualization to the second portion of the duodenum. The endoscope was then slowly withdrawn while closely visualizing the mucosa. In the stomach a retroflexion was performed and gastric fundus and cardia visualized. The scope was then removed. The patient was then transferred to the recovery room. FINDINGS:   Esophagus: The esophageal mucosa was normal with no ulceration, mass or stricture.   There was no evidence of Espinosa's esophagus or reflux esophagitis. Z line at 38 cm. Scattered white exudates seen in the esophagus,brushing done to r/o candida esophagitis. Stomach: The gastric mucosa showed moderate gastritis with multiple small erosions and petechial lesions in antrum , biopsies done to r/o H. Pylori. Suspicious for NSAID use. Retroflexion revealed a normal cardia and fundus      Duodenum: The duodenum mucosa showed mild duodenitis in the bulb. Normal second portion. EBL: None      SPECIMENS:   ID Type Source Tests Collected by Time Destination   1 : Bx R/O H. Pylori  Preservative Gastric  Mendu, Claudette Don, MD 4/17/2019 1151 Pathology   2 : Bx R/O Microscopic colitis Preservative Random colon  Mendu, Claudette Don, MD 4/17/2019 1202 Pathology   1 : Brushings x 1 pass R/O Candida  Other Esophagus  Mendu, Claudette Don, MD 4/17/2019 1200 Cytology       IMPRESSION: 1.Scattered white exudates seen in the esophagus,brushing done to r/o candida esophagitis. 2. The gastric mucosa showed moderate gastritis with multiple small erosions and petechial lesions in antrum , biopsies done to r/o H. Pylori. Suspicious for NSAID use. 3. Mild duodenitis in the bulb    PLAN 1: Discharge when sedation criteria are met. 2.  Resume regular Diet as tolerated. 3. Follow up on the biopsy results 4. Avoid NSAID use  5. Cont Omeprazole     Follow Up:  As scheduled      Shara Stevens MD  4/17/2019                              Colonoscopy Procedure Note    Patient: Honey Gutiérrez MRN: [id number]  SSN: [id number]    YOB: 1950  Age: 77 y.o. Sex: female      Date of Procedure: 4/17/2019      Procedures:  COLONOSCOPY:   HISTORY UPDATE: History and physical has been reviewed. The patient has been examined. There have been no significant clinical changes since the completion of the originally dated History and Physical.   INDICATION:  Chronic diarrhea   PROCEDURE PERFORMED:Colonoscopy was complete to cecum.    ENDOSCOPIST: Claudette Don Mendu, MD   ASSISTANT:  Endoscopy Technician-1: [address]  Pre Op Nurse: Rasheed Funk RN   CLASSIFICATION OF PREOPERATIVE RISK: ASA 3 - Patient with moderate systemic disease with functional limitations   ANESTHESIA:  MAC anesthesia   ENDOSCOPE: [id number]                                                                                                        EXTENT OF EXAM: Cecum    QUALITY OF COLON PREPARATION:  good     DESCRIPTION OF PROCEDURE:  The procedure was discussed with the     patient including but not limited to IV conscious sedation, bleeding, perforation and missed polyps and the consent form was signed and witnessed. A safety timeout was performed. Procedure done with MAC. The patient's vitals were monitored at all times, including heart rate and rhythm, oxygen saturation, and blood pressure. The patient was then placed into the left lateral decubitus position. A rectal exam was performed. The Olympus Adult diagnostic colonscope was then passed under direct visualization with ease to thececum. The cecum was identified by landmarks including Ileocecal valve, appendiceal orifice and crow's foot. The scope was then slowly withdrawn while closely visualizing the mucosa in the rectum a retroflection was performed and distal rectum visualized. The scope was then removed. The patient was transferred to the recovery room in stable condition. FINDINGS:1. Mild diverticulosis seen on the left side of the colon 2. Small internal hemorrhoids on retroflexion 3. A few small hyperplastic polyps in rectum not removed 4. Random colon biopsies done from normal looking mucosa to r/o microscopic colitis 5.  Decreased rectal tone    EBL: None   SPECIMENS:   ID Type Source Tests Collected by Time Destination   1 : Bx R/O H. Pylori  Preservative Gastric  Mendu, Claudette Don, MD 4/17/2019 1151 Pathology   2 : Bx R/O Microscopic colitis Preservative Random colon  Mendu, Claudette Don, MD 4/17/2019 1202 Pathology 1 : Brushings x 1 pass R/O Candida  Other Esophagus  Jose Mckeon MD 4/17/2019 1200 Cytology      IMPRESSION:1. Mild diverticulosis seen on the left side of the colon 2. Small internal hemorrhoids on retroflexion 3. A few small hyperplastic polyps in rectum not removed 4. Random colon biopsies done from normal looking mucosa to r/o microscopic colitis 5.  Decreased rectal tone     RECOMMENDATIONS:               1.   Further recommendations will be made based on pathology results                2.  High fibre diet                  Follow Up:   As scheduled       Akiko Tapia MD   4/17/2019

## 2019-04-17 NOTE — PERIOP NOTES
Phase 2 Recovery Summary Patient arrived to Phase 2 at 1221 Report received from [facility], [address] [facility] Vitals:  
 04/15/19 1345 04/17/19 1055 BP:  115/85 Pulse:  100 Resp:  18 Temp:  98.3 °F (36.8 °C) SpO2:  100% Weight: 78.9 kg (174 lb) 78 kg (172 lb) Height: 5' (1.524 m) 5' (1.524 m) oriented to time, place, person and situation Lines and Drains Peripheral Intravenous Line:  
Peripheral IV 04/17/19 Right Forearm (Active) Site Assessment Clean, dry, & intact 4/17/2019 11:08 AM  
Phlebitis Assessment 0 4/17/2019 11:08 AM  
Infiltration Assessment 0 4/17/2019 11:08 AM  
Dressing Status Clean, dry, & intact 4/17/2019 11:08 AM  
Dressing Type Tape;Transparent 4/17/2019 11:08 AM  
Hub Color/Line Status Infusing;Pink 4/17/2019 11:08 AM  
Action Taken Open ports on tubing capped 4/17/2019 11:08 AM  
Alcohol Cap Used Yes 4/17/2019 11:08 AM  
 
 
Patient arrived to phase 2 from Endo. Alert and oriented x4. No complaints of pain, nausea or dizziness. Vital signs taken. Blood sugar rechecked. Was 282. Contacted Dr. Ema Ramachandran, no orders available to administer insulin. 9 units of insulin given. Rechecked blood sugar 15 minutes later. . Dr. Ema Ramachandran stated was ok to discharge patient and have her self medicate at home. Patient ambulated from bed to chair with minimal assistance. IV removed and patient allowed to get dressed. Reviewed discharge instructions.  signed for discharge. Patient transported to vehicle via wheelchair. Patient discharged to home with , Charmayne Potash  at [address]

## 2019-04-17 NOTE — DISCHARGE INSTRUCTIONS
EGD/COLONSCOPY DISCHARGE    You have just had an examination of your esophagus (swallowing tube), stomach and duodenum (the first part of your small intestine) and of your colon (large intestine). The medication given to you during your procedure will be acting in your body for the next 12 hours, gradually wearing off. Your face may be flushed, skin may be warm and sweaty, you might feel a little bloated or nauseated and sleepy. 1. For the next 12 hours you SHOULD NOT:    a. Drive, operate any machinery. b. Drink alcohol.  c. Engage in activities that require mental sharpness or manual dexterity such as cooking. d. Take any medications other than prescribed by a physician.  e. Make any legal or financial decisions. 2. Call this office immediately, if:    a. Onset of new or increase of lidia rectal bleeding.  b. Fever > 101  c. Increased abdominal pain    Additional instructions:    a. Diet as recommended  b. Due to risk of dehydration after colon prep and sedation, avoid extended periods of time outdoors if the day is hot and humid. c. If biopsies were taken, you will receive a post card or telephone call with results of your biopsies and suggestion for your next colonoscopy. Please allow us at least 3 weeks to get back with you about your results. If we have not contacted you by the, please call us for results. # [phone]  d. If you had polyp(s) removed DO NOT TAKE NSAIDS (Aspirin, Advil, Aleve, Naproxyn, Ibuprofen, etc) for 2 weeks. Tylenol is OK to use.         DISCHARGE SUMMARY from Nurse    PATIENT INSTRUCTIONS:    After general anesthesia or intravenous sedation, for 24 hours or while taking prescription Narcotics:  · Limit your activities  · Do not drive and operate hazardous machinery  · Do not make important personal or business decisions  · Do  not drink alcoholic beverages  · If you have not urinated within 8 hours after discharge, please contact your surgeon on call.    Report the following to your surgeon:  · Excessive pain, swelling, redness or odor of or around the surgical area  · Temperature over 100.5  · Nausea and vomiting lasting longer than 4 hours or if unable to take medications  · Any signs of decreased circulation or nerve impairment to extremity: change in color, persistent  numbness, tingling, coldness or increase pain  · Any questions    What to do at Home:  Recommended activity: Activity as tolerated and no driving for today    These are general instructions for a healthy lifestyle:    No smoking/ No tobacco products/ Avoid exposure to second hand smoke  Surgeon General's Warning:  Quitting smoking now greatly reduces serious risk to your health. Obesity, smoking, and sedentary lifestyle greatly increases your risk for illness    A healthy diet, regular physical exercise & weight monitoring are important for maintaining a healthy lifestyle    You may be retaining fluid if you have a history of heart failure or if you experience any of the following symptoms:  Weight gain of 3 pounds or more overnight or 5 pounds in a week, increased swelling in our hands or feet or shortness of breath while lying flat in bed. Please call your doctor as soon as you notice any of these symptoms; do not wait until your next office visit. Recognize signs and symptoms of STROKE:    F-face looks uneven    A-arms unable to move or move unevenly    S-speech slurred or non-existent    T-time-call 911 as soon as signs and symptoms begin-DO NOT go       Back to bed or wait to see if you get better-TIME IS BRAIN. Warning Signs of HEART ATTACK     Call 911 if you have these symptoms:   Chest discomfort. Most heart attacks involve discomfort in the center of the chest that lasts more than a few minutes, or that goes away and comes back. It can feel like uncomfortable pressure, squeezing, fullness, or pain.  Discomfort in other areas of the upper body.  Symptoms can include pain or discomfort in one or both arms, the back, neck, jaw, or stomach.  Shortness of breath with or without chest discomfort.  Other signs may include breaking out in a cold sweat, nausea, or lightheadedness. Don't wait more than five minutes to call 911 - MINUTES MATTER! Fast action can save your life. Calling 911 is almost always the fastest way to get lifesaving treatment. Emergency Medical Services staff can begin treatment when they arrive -- up to an hour sooner than if someone gets to the hospital by car. The discharge information has been reviewed with the patient. The patient verbalized understanding. Discharge medications reviewed with the patient and appropriate educational materials and side effects teaching were provided. Patient armband removed and given to patient to take home. Patient was informed of the privacy risks if armband lost or stolen.
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
## 2019-04-17 NOTE — ANESTHESIA POSTPROCEDURE EVALUATION
Procedure(s): ESOPHAGOGASTRODUODENOSCOPY (EGD) COLONOSCOPY. MAC Anesthesia Post Evaluation Multimodal analgesia: multimodal analgesia not used between 6 hours prior to anesthesia start to PACU discharge Patient location during evaluation: PACU Patient participation: complete - patient participated Level of consciousness: awake Pain score: 0 Pain management: adequate Airway patency: patent Anesthetic complications: no 
Cardiovascular status: acceptable Respiratory status: acceptable Hydration status: acceptable Post anesthesia nausea and vomiting:  none No vitals data found for the desired time range.

## 2019-04-17 NOTE — PERIOP NOTES
Pre-Op Summary Pt arrived via car with family/friend and is oriented to time, place, person and situation. Patient with unsteady gait with wheelchair assistive devices. Visit Vitals /85 Pulse 100 Temp 98.3 °F (36.8 °C) Resp 18 Ht 5' (1.524 m) Wt 78 kg (172 lb) SpO2 100% Breastfeeding? No  
BMI 33.59 kg/m² Peripheral IV located on Right forearm. Patients belongings are located with pt. Patient's point of contact is Tilton Dakins and their contact number is: [phone]. They will be in the waiting room. They are able to receive medication information. They will be their ride home.

## 2019-04-17 NOTE — ANESTHESIA PREPROCEDURE EVALUATION
Relevant Problems No relevant active problems Anesthetic History No history of anesthetic complications Review of Systems / Medical History Patient summary reviewed and pertinent labs reviewed Pulmonary COPD: mild Neuro/Psych  
 
 
CVA (last in october) Cardiovascular Hypertension Past MI and CAD Exercise tolerance: <4 METS Comments: Implanted loop recorder GI/Hepatic/Renal 
Within defined limits Endo/Other Diabetes: poorly controlled, type 2, using insulin Other Findings Comments:  
 
 
  
 
 
 
Physical Exam 
 
Airway Mallampati: III 
TM Distance: 4 - 6 cm Neck ROM: normal range of motion Mouth opening: Normal 
 
 Cardiovascular Regular rate and rhythm,  S1 and S2 normal,  no murmur, click, rub, or gallop Rhythm: regular Rate: normal 
 
 
 
 Dental 
 
Dentition: Full lower dentures Pulmonary Breath sounds clear to auscultation Abdominal 
GI exam deferred Other Findings Anesthetic Plan ASA: 3 Anesthesia type: MAC Induction: Intravenous Anesthetic plan and risks discussed with: Patient

## 2019-04-19 ENCOUNTER — TELEPHONE (OUTPATIENT)
Dept: FAMILY MEDICINE CLINIC | Age: 69
End: 2019-04-19

## 2019-04-19 NOTE — TELEPHONE ENCOUNTER
Patient called to say she had her colo 4/17/19 and the dr said everything was fine except she should get off of Aleve because it is causing internal abrasions.

## 2019-04-22 PROBLEM — T39.395A NSAID INDUCED GASTRITIS: Status: ACTIVE | Noted: 2019-04-22

## 2019-04-22 PROBLEM — K29.60 NSAID INDUCED GASTRITIS: Status: ACTIVE | Noted: 2019-04-22

## 2019-04-22 PROBLEM — K52.81 EOSINOPHILIC ENTERITIS: Status: ACTIVE | Noted: 2019-04-22

## 2019-04-24 ENCOUNTER — TELEPHONE (OUTPATIENT)
Dept: FAMILY MEDICINE CLINIC | Age: 69
End: 2019-04-24

## 2019-04-24 DIAGNOSIS — M54.16 LEFT LUMBAR RADICULOPATHY: ICD-10-CM

## 2019-04-24 NOTE — TELEPHONE ENCOUNTER
Pt is interested in PT. She would like someone close to where she lives. She is still taking the neurontin.

## 2019-04-24 NOTE — TELEPHONE ENCOUNTER
Jamir nurse called from pt's home, left msg on nurse vm. Asking what else pt can take for back pain now that she is unable to continue Aleve.

## 2019-04-25 RX ORDER — DULOXETIN HYDROCHLORIDE 30 MG/1
CAPSULE, DELAYED RELEASE ORAL
Qty: 60 CAP | Refills: 2 | Status: SHIPPED | OUTPATIENT
Start: 2019-04-25 | End: 2019-10-25 | Stop reason: SDUPTHER

## 2019-05-13 ENCOUNTER — TELEPHONE (OUTPATIENT)
Dept: FAMILY MEDICINE CLINIC | Age: 69
End: 2019-05-13

## 2019-05-13 NOTE — TELEPHONE ENCOUNTER
Patients  called to verify dosing instructions of pts insulin. States pts sugars are running in the 500's. Read to MrNicole Demetri Malave that we have Lantus 80 units once a day, and Novolog 25 units before each meal.     He states patient has only been taking insulin at night. States he is going to check what they have in the fridge and give me a call back.

## 2019-05-20 ENCOUNTER — HOSPITAL ENCOUNTER (OUTPATIENT)
Dept: MAMMOGRAPHY | Age: 69
Discharge: HOME | End: 2019-05-20
Attending: INTERNAL MEDICINE
Payer: MEDICARE

## 2019-05-20 DIAGNOSIS — Z12.31 VISIT FOR SCREENING MAMMOGRAM: ICD-10-CM

## 2019-05-20 PROCEDURE — 77063 BREAST TOMOSYNTHESIS BI: CPT

## 2019-06-04 ENCOUNTER — TELEPHONE (OUTPATIENT)
Dept: FAMILY MEDICINE CLINIC | Age: 69
End: 2019-06-04

## 2019-06-04 NOTE — TELEPHONE ENCOUNTER
Alda Ahmadi patient's care manager called Mrs Medina Cornea blood sugar is still elevated and is  ranges anywhere from 165 to 453 in the mornings, she says patient is taking Lantus at night and Novolog 3 times daily please advise.

## 2019-06-04 NOTE — TELEPHONE ENCOUNTER
Pt has h/o noncompliance with meds, recommendations, follow-up, monitoring blood sugars, etc.  She should make an appt for f/u to discuss her iabetic concerns.

## 2019-06-05 ENCOUNTER — TELEPHONE (OUTPATIENT)
Dept: CARDIOLOGY CLINIC | Age: 69
End: 2019-06-05

## 2019-06-05 NOTE — TELEPHONE ENCOUNTER
Called patient to schedule follow up for loop recorder findings (afib and bradycardia). VM on cell phone full. Left message on 's voicemail.

## 2019-07-03 ENCOUNTER — OFFICE VISIT (OUTPATIENT)
Dept: CARDIOLOGY CLINIC | Age: 69
End: 2019-07-03

## 2019-07-03 DIAGNOSIS — I63.432 CEREBROVASCULAR ACCIDENT (CVA) DUE TO EMBOLISM OF LEFT POSTERIOR CEREBRAL ARTERY (HCC): Primary | ICD-10-CM

## 2019-07-03 DIAGNOSIS — Z45.09 ENCOUNTER FOR LOOP RECORDER CHECK: ICD-10-CM

## 2019-07-16 ENCOUNTER — TELEPHONE (OUTPATIENT)
Dept: CARDIOLOGY CLINIC | Age: 69
End: 2019-07-16

## 2019-07-16 NOTE — PROGRESS NOTES
I have personally seen and evaluated the device findings. Interrogation reviewed and I agree with assessment.     Margarita Jameson

## 2019-07-30 ENCOUNTER — APPOINTMENT (OUTPATIENT)
Dept: GENERAL RADIOLOGY | Age: 69
End: 2019-07-30
Attending: EMERGENCY MEDICINE
Payer: MEDICARE

## 2019-07-30 ENCOUNTER — HOSPITAL ENCOUNTER (EMERGENCY)
Age: 69
Discharge: HOME | End: 2019-07-30
Attending: EMERGENCY MEDICINE
Payer: MEDICARE

## 2019-07-30 ENCOUNTER — APPOINTMENT (OUTPATIENT)
Dept: GENERAL RADIOLOGY | Age: 69
End: 2019-07-30
Attending: PHYSICIAN ASSISTANT
Payer: MEDICARE

## 2019-07-30 VITALS
HEART RATE: 117 BPM | RESPIRATION RATE: 16 BRPM | HEIGHT: 60 IN | BODY MASS INDEX: 30.82 KG/M2 | TEMPERATURE: 98 F | OXYGEN SATURATION: 97 % | SYSTOLIC BLOOD PRESSURE: 153 MMHG | WEIGHT: 157 LBS | DIASTOLIC BLOOD PRESSURE: 94 MMHG

## 2019-07-30 DIAGNOSIS — R00.0 TACHYCARDIA: ICD-10-CM

## 2019-07-30 DIAGNOSIS — R73.9 HYPERGLYCEMIA: ICD-10-CM

## 2019-07-30 DIAGNOSIS — I44.7 BUNDLE BRANCH BLOCK, LEFT: ICD-10-CM

## 2019-07-30 DIAGNOSIS — R94.31 ABNORMAL EKG: ICD-10-CM

## 2019-07-30 DIAGNOSIS — R77.8 ELEVATED TROPONIN I LEVEL: ICD-10-CM

## 2019-07-30 DIAGNOSIS — R07.81 RIB PAIN ON LEFT SIDE: Primary | ICD-10-CM

## 2019-07-30 LAB
ALBUMIN SERPL-MCNC: 3.8 G/DL (ref 3.4–5)
ALBUMIN/GLOB SERPL: 1 {RATIO} (ref 0.8–1.7)
ALP SERPL-CCNC: 143 U/L (ref 45–117)
ALT SERPL-CCNC: 26 U/L (ref 13–56)
ANION GAP SERPL CALC-SCNC: 9 MMOL/L (ref 3–18)
AST SERPL-CCNC: 17 U/L (ref 10–38)
BASOPHILS # BLD: 0 K/UL (ref 0–0.1)
BASOPHILS NFR BLD: 0 % (ref 0–2)
BILIRUB SERPL-MCNC: 0.3 MG/DL (ref 0.2–1)
BNP SERPL-MCNC: 492 PG/ML (ref 0–900)
BUN SERPL-MCNC: 30 MG/DL (ref 7–18)
BUN/CREAT SERPL: 23 (ref 12–20)
CALCIUM SERPL-MCNC: 9.1 MG/DL (ref 8.5–10.1)
CHLORIDE SERPL-SCNC: 100 MMOL/L (ref 100–111)
CK MB CFR SERPL CALC: 2 % (ref 0–4)
CK MB SERPL-MCNC: 1.5 NG/ML (ref 5–25)
CK SERPL-CCNC: 75 U/L (ref 26–192)
CO2 SERPL-SCNC: 23 MMOL/L (ref 21–32)
CREAT SERPL-MCNC: 1.32 MG/DL (ref 0.6–1.3)
DIFFERENTIAL METHOD BLD: ABNORMAL
EOSINOPHIL # BLD: 0.2 K/UL (ref 0–0.4)
EOSINOPHIL NFR BLD: 2 % (ref 0–5)
ERYTHROCYTE [DISTWIDTH] IN BLOOD BY AUTOMATED COUNT: 14.3 % (ref 11.6–14.5)
GLOBULIN SER CALC-MCNC: 3.7 G/DL (ref 2–4)
GLUCOSE SERPL-MCNC: 566 MG/DL (ref 74–99)
HCT VFR BLD AUTO: 40.5 % (ref 35–45)
HGB BLD-MCNC: 13.8 G/DL (ref 12–16)
LACTATE BLD-SCNC: 1.49 MMOL/L (ref 0.4–2)
LYMPHOCYTES # BLD: 1.6 K/UL (ref 0.9–3.6)
LYMPHOCYTES NFR BLD: 21 % (ref 21–52)
MCH RBC QN AUTO: 28.9 PG (ref 24–34)
MCHC RBC AUTO-ENTMCNC: 34.1 G/DL (ref 31–37)
MCV RBC AUTO: 84.9 FL (ref 74–97)
MONOCYTES # BLD: 0.4 K/UL (ref 0.05–1.2)
MONOCYTES NFR BLD: 5 % (ref 3–10)
NEUTS SEG # BLD: 5.6 K/UL (ref 1.8–8)
NEUTS SEG NFR BLD: 72 % (ref 40–73)
PLATELET # BLD AUTO: 240 K/UL (ref 135–420)
PMV BLD AUTO: 9.1 FL (ref 9.2–11.8)
POTASSIUM SERPL-SCNC: 4 MMOL/L (ref 3.5–5.5)
PROT SERPL-MCNC: 7.5 G/DL (ref 6.4–8.2)
RBC # BLD AUTO: 4.77 M/UL (ref 4.2–5.3)
SODIUM SERPL-SCNC: 132 MMOL/L (ref 136–145)
TROPONIN I SERPL-MCNC: 0.15 NG/ML (ref 0–0.04)
WBC # BLD AUTO: 7.8 K/UL (ref 4.6–13.2)

## 2019-07-30 PROCEDURE — 93005 ELECTROCARDIOGRAM TRACING: CPT

## 2019-07-30 PROCEDURE — 71046 X-RAY EXAM CHEST 2 VIEWS: CPT

## 2019-07-30 PROCEDURE — 82550 ASSAY OF CK (CPK): CPT

## 2019-07-30 PROCEDURE — 99284 EMERGENCY DEPT VISIT MOD MDM: CPT

## 2019-07-30 PROCEDURE — 83605 ASSAY OF LACTIC ACID: CPT

## 2019-07-30 PROCEDURE — 83880 ASSAY OF NATRIURETIC PEPTIDE: CPT

## 2019-07-30 PROCEDURE — 74011000250 HC RX REV CODE- 250: Performed by: EMERGENCY MEDICINE

## 2019-07-30 PROCEDURE — 71100 X-RAY EXAM RIBS UNI 2 VIEWS: CPT

## 2019-07-30 PROCEDURE — 80053 COMPREHEN METABOLIC PANEL: CPT

## 2019-07-30 PROCEDURE — 74011250637 HC RX REV CODE- 250/637: Performed by: EMERGENCY MEDICINE

## 2019-07-30 PROCEDURE — 85025 COMPLETE CBC W/AUTO DIFF WBC: CPT

## 2019-07-30 RX ORDER — TRAMADOL HYDROCHLORIDE 50 MG/1
50 TABLET ORAL
Qty: 12 TAB | Refills: 0 | Status: SHIPPED | OUTPATIENT
Start: 2019-07-30 | End: 2019-08-02

## 2019-07-30 RX ORDER — GABAPENTIN 100 MG/1
100 CAPSULE ORAL 3 TIMES DAILY
Status: DISCONTINUED | OUTPATIENT
Start: 2019-07-30 | End: 2019-07-30

## 2019-07-30 RX ORDER — OXYCODONE AND ACETAMINOPHEN 5; 325 MG/1; MG/1
1 TABLET ORAL
Status: COMPLETED | OUTPATIENT
Start: 2019-07-30 | End: 2019-07-30

## 2019-07-30 RX ORDER — SODIUM CHLORIDE 9 MG/ML
150 INJECTION, SOLUTION INTRAVENOUS CONTINUOUS
Status: DISCONTINUED | OUTPATIENT
Start: 2019-07-30 | End: 2019-07-30 | Stop reason: HOSPADM

## 2019-07-30 RX ORDER — LIDOCAINE 4 G/100G
1 PATCH TOPICAL EVERY 24 HOURS
Status: DISCONTINUED | OUTPATIENT
Start: 2019-07-30 | End: 2019-07-30 | Stop reason: HOSPADM

## 2019-07-30 RX ORDER — DIPHENHYDRAMINE HCL 25 MG
25 CAPSULE ORAL
Status: COMPLETED | OUTPATIENT
Start: 2019-07-30 | End: 2019-07-30

## 2019-07-30 RX ORDER — GABAPENTIN 100 MG/1
100 CAPSULE ORAL
Status: COMPLETED | OUTPATIENT
Start: 2019-07-30 | End: 2019-07-30

## 2019-07-30 RX ORDER — LIDOCAINE 50 MG/G
PATCH TOPICAL
Qty: 30 EACH | Refills: 0 | Status: SHIPPED | OUTPATIENT
Start: 2019-07-30 | End: 2019-10-25 | Stop reason: ALTCHOICE

## 2019-07-30 RX ADMIN — GABAPENTIN 100 MG: 100 CAPSULE ORAL at 13:33

## 2019-07-30 RX ADMIN — DIPHENHYDRAMINE HYDROCHLORIDE 25 MG: 25 CAPSULE ORAL at 13:21

## 2019-07-30 RX ADMIN — OXYCODONE HYDROCHLORIDE AND ACETAMINOPHEN 1 TABLET: 5; 325 TABLET ORAL at 13:21
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
## 2019-07-30 NOTE — ED NOTES
I have reviewed discharge instructions with the patient. The patient verbalized understanding.   Patient armband removed and shredded, scripts x 2 given'

## 2019-07-30 NOTE — ED NOTES
12:41 PM    Josh Rodriguez is a 77 y.o. female presenting to the ED C/O left lower rib pain, onset 2 days ago after leaning over a washing chain and feeling a pop. Denies other chest pain or shortness of breath. I performed a brief history of the patient here in triage and I have determined that pt will need further treatment and evaluation from the [facility] ER physician due to tachycardia. I have placed initial orders to help in expediting patients care.          Visit Vitals  BP (!) 154/105 (BP 1 Location: Right arm, BP Patient Position: At rest)   Pulse (!) 125   Temp 98 °F (36.7 °C)   Resp 16   Ht 5' (1.524 m)   Wt 71.2 kg (157 lb)   SpO2 95%   BMI 30.66 kg/m²          Written by ZOFIA Nunez, ED

## 2019-07-30 NOTE — DISCHARGE INSTRUCTIONS
Patient Education        Chest Pain: Care Instructions  Your Care Instructions    Patient Education        Learning About High Blood Sugar  What is high blood sugar? Your body turns the food you eat into glucose (sugar), which it uses for energy. But if your body isn't able to use the sugar right away, it can build up in your blood and lead to high blood sugar. When the amount of sugar in your blood stays too high for too much of the time, you may have diabetes. Diabetes is a disease that can cause serious health problems. The good news is that lifestyle changes may help you get your blood sugar back to normal and avoid or delay diabetes. What causes high blood sugar? Sugar (glucose) can build up in your blood if you:  · Are overweight. · Have a family history of diabetes. · Take certain medicines, such as steroids. What are the symptoms? Having high blood sugar may not cause any symptoms at all. Or it may make you feel very thirsty or very hungry. You may also urinate more often than usual, have blurry vision, or lose weight without trying. How is high blood sugar treated? You can take steps to lower your blood sugar level if you understand what makes it get higher. Your doctor may want you to learn how to test your blood sugar level at home. Then you can see how illness, stress, or different kinds of food or medicine raise or lower your blood sugar level. Other tests may be needed to see if you have diabetes. How can you prevent high blood sugar? · Watch your weight. If you're overweight, losing just a small amount of weight may help. Reducing fat around your waist is most important. · Limit the amount of calories, sweets, and unhealthy fat you eat. Ask your doctor if a dietitian can help you. A registered dietitian can help you create meal plans that fit your lifestyle. · Get at least 30 minutes of exercise on most days of the week. Exercise helps control your blood sugar.  It also helps you maintain a healthy weight. Walking is a good choice. You also may want to do other activities, such as running, swimming, cycling, or playing tennis or team sports. · If your doctor prescribed medicines, take them exactly as prescribed. Call your doctor if you think you are having a problem with your medicine. You will get more details on the specific medicines your doctor prescribes. Follow-up care is a key part of your treatment and safety. Be sure to make and go to all appointments, and call your doctor if you are having problems. It's also a good idea to know your test results and keep a list of the medicines you take. Where can you learn more? Go to http://flores-kendell.info/. Enter O108 in the search box to learn more about \"Learning About High Blood Sugar. \"  Current as of: July 25, 2018  Content Version: 12.1  © 1636-1477 [employer]. Care instructions adapted under license by [employer] (which disclaims liability or warranty for this information). If you have questions about a medical condition or this instruction, always ask your healthcare professional. [address] any warranty or liability for your use of this information. There are many things that can cause chest pain. Some are not serious and will get better on their own in a few days. But some kinds of chest pain need more testing and treatment. Your doctor may have recommended a follow-up visit in the next 8 to 12 hours. If you are not getting better, you may need more tests or treatment. Even though your doctor has released you, you still need to watch for any problems. The doctor carefully checked you, but sometimes problems can develop later. If you have new symptoms or if your symptoms do not get better, get medical care right away.   If you have worse or different chest pain or pressure that lasts more than 5 minutes or you passed out (lost consciousness), call 911 or seek other emergency help right away. A medical visit is only one step in your treatment. Even if you feel better, you still need to do what your doctor recommends, such as going to all suggested follow-up appointments and taking medicines exactly as directed. This will help you recover and help prevent future problems. How can you care for yourself at home? · Rest until you feel better. · Take your medicine exactly as prescribed. Call your doctor if you think you are having a problem with your medicine. · Do not drive after taking a prescription pain medicine. When should you call for help? Call 911 if:    · You passed out (lost consciousness).     · You have severe difficulty breathing.     · You have symptoms of a heart attack. These may include:  ? Chest pain or pressure, or a strange feeling in your chest.  ? Sweating. ? Shortness of breath. ? Nausea or vomiting. ? Pain, pressure, or a strange feeling in your back, neck, jaw, or upper belly or in one or both shoulders or arms. ? Lightheadedness or sudden weakness. ? A fast or irregular heartbeat. After you call 911, the  may tell you to chew 1 adult-strength or 2 to 4 low-dose aspirin. Wait for an ambulance. Do not try to drive yourself.    Call your doctor today if:    · You have any trouble breathing.     · Your chest pain gets worse.     · You are dizzy or lightheaded, or you feel like you may faint.     · You are not getting better as expected.     · You are having new or different chest pain. Where can you learn more? Go to http://flores-kendell.info/. Enter A120 in the search box to learn more about \"Chest Pain: Care Instructions. \"  Current as of: September 23, 2018  Content Version: 12.1  © 9714-1369 [employer]. Care instructions adapted under license by [employer] (which disclaims liability or warranty for this information).  If you have questions about a medical condition or this instruction, always ask your healthcare professional. [place] [address] any warranty or liability for your use of this information.

## 2019-07-31 ENCOUNTER — PATIENT OUTREACH (OUTPATIENT)
Dept: FAMILY MEDICINE CLINIC | Age: 69
End: 2019-07-31

## 2019-07-31 LAB
ATRIAL RATE: 119 BPM
CALCULATED R AXIS, ECG10: -45 DEGREES
CALCULATED T AXIS, ECG11: 116 DEGREES
DIAGNOSIS, 93000: NORMAL
P-R INTERVAL, ECG05: 200 MS
Q-T INTERVAL, ECG07: 338 MS
QRS DURATION, ECG06: 152 MS
QTC CALCULATION (BEZET), ECG08: 475 MS
VENTRICULAR RATE, ECG03: 119 BPM

## 2019-07-31 NOTE — PROGRESS NOTES
Transition of Care Follow Up    Patient Outreached Attempted. Patient's mailbox is full, I could not leave a message. Attempted to reach patient on day 1 of discharge. Patient seen in ED at [facility] for Chest pain on 7/30/19.

## 2019-08-01 ENCOUNTER — PATIENT OUTREACH (OUTPATIENT)
Dept: FAMILY MEDICINE CLINIC | Age: 69
End: 2019-08-01

## 2019-08-01 NOTE — PROGRESS NOTES
ED Visit Discharge Follow-Up      Date/Time:  2019 1:16 PM    Patient was seen at [facility] ED on 19 and  for Chest Pain. The physician discharge summary was available at the time of outreach. Patient was contacted within 2 business days of discharge. I spoke with patient's . He states she is doing ok. He decline an appointment with pcp. He states he is going to schedule an appointment with her cardiologist first. She taking all her medications    ED Course:    4:22 PM  I have gone in detail with all the labs EKG abnormality and elevated troponin hyperglycemia with the patient and the family at bedside they do not want any further intervention done they would like to be released from the hospital and they would manage her glucose at home. I have explained that we can manage patient better with the glucose accurately and also check second heart lab but    Top Challenges reviewed with the provider   Chest pain         Method of communication with provider :chart routing    Inpatient RRAT score: High Risk            34       Total Score        3 Has Seen PCP in Last 6 Months (Yes=3, No=0)    31 Charlson Comorbidity Score (Age + Comorbid Conditions)        Criteria that do not apply:    . Living with Significant Other. Assisted Living. LTAC. SNF. or   Rehab    Patient Length of Stay (>5 days = 3)    IP Visits Last 12 Months (1-3=4, 4=9, >4=11)    Pt. Coverage (Medicare=5 , Medicaid, or Self-Pay=4)        Was this a readmission? no       Care Coordinator (CC) contacted the family by telephone to perform post hospital discharge assessment. Verified name and  with family as identifiers. Provided introduction to self, and explanation of the Care Coordinator role. Reviewed discharge instructions and red flags with family who verbalized understanding. Family given an opportunity to ask questions and does not have any further questions or concerns at this time.  The family agrees to contact the PCP office for questions related to their healthcare. CC provided contact information for future reference. Summary of patient's top problems:  1. Chest pain  2.   3.         Medication(s):   New Medications at Discharge: Lidoderm patch, Tramadol  Changed Medications at Discharge: no  Discontinued Medications at Discharge: none    Medication reconciliation was performed with family, who verbalizes understanding of administration of home medications. There were no barriers to obtaining medications identified at this time. Referral to Pharm D needed: no     Current Outpatient Medications   Medication Sig    lidocaine (LIDODERM) 5 % Apply patch to the affected area for 12 hours a day and remove for 12 hours a day.  traMADol (ULTRAM) 50 mg tablet Take 1 Tab by mouth every six (6) hours as needed for Pain for up to 3 days. Max Daily Amount: 200 mg.    DULoxetine (CYMBALTA) 30 mg capsule TAKE 1 CAPSULE BY MOUTH ONCE DAILY AT BEDTIME FOR 7 DAYS, THEN INCREASE TO 2 ONCE DAILY AT BEDTIME THEREAFTER    aspirin delayed-release 81 mg tablet Take 81 mg by mouth.  lisinopril (PRINIVIL, ZESTRIL) 10 mg tablet TAKE 1 TABLET BY MOUTH ONCE DAILY    pantoprazole (PROTONIX) 40 mg tablet TAKE 1 TABLET BY MOUTH ONCE DAILY    carvedilol (COREG) 12.5 mg tablet TAKE 1 TABLET BY MOUTH TWICE DAILY WITH  MEALS    clopidogrel (PLAVIX) 75 mg tab Take 1 Tab by mouth daily.  glipiZIDE (GLUCOTROL) 10 mg tablet TAKE 1 TABLET BY MOUTH TWICE DAILY    gabapentin (NEURONTIN) 100 mg capsule Take  by mouth three (3) times daily.  insulin glargine (LANTUS) 100 unit/mL injection 80 Units by SubCUTAneous route daily.  insulin aspart U-100 (NOVOLOG) 100 unit/mL injection 25 Units by SubCUTAneous route Before breakfast, lunch, and dinner.  pens    promethazine (PHENERGAN) 25 mg tablet TAKE 1 TABLET BY MOUTH EVERY 8 HOURS AS NEEDED FOR NAUSEA    polyethylene glycol (MIRALAX) 17 gram packet Mix 1 packet (17g) into 4-8 ounces of beverage and drink once or twice daily. Results in 2-4 days, Max treatment length of 2 weeks.  dicyclomine (BENTYL) 10 mg capsule Take 1 Cap by mouth four (4) times daily as needed.  atorvastatin (LIPITOR) 40 mg tablet Take 1 Tab by mouth daily.  ferrous sulfate (IRON) 325 mg (65 mg iron) tablet Take 325 mg by mouth Daily (before breakfast). Indications: Iron Deficiency Anemia    alendronate (FOSAMAX) 70 mg tablet Take 1 Tab by mouth every seven (7) days. No current facility-administered medications for this visit. There are no discontinued medications. [facility] follow up appointment(s):   Future Appointments   Date Time Provider Mp Leijai   8/13/2019  1:15 PM No[facility] Hair  [facility]   10/9/2019  2:20 PM CSI, PACER  [facility]      Non-[facility] follow up appointment(s): n/a  [facility]:  n/a       Goals      Attends follow-up appointments as directed. Patient will follow up with cardiology and pcp        Returns to baseline activity level.

## 2019-08-12 ENCOUNTER — PATIENT OUTREACH (OUTPATIENT)
Dept: FAMILY MEDICINE CLINIC | Age: 69
End: 2019-08-12

## 2019-08-12 NOTE — PROGRESS NOTES
Patient Outreached Attempted. Received patient's voicemail box. Message left requesting call back. Patient scheduled to see cardiology on 8/26/19.

## 2019-08-26 ENCOUNTER — OFFICE VISIT (OUTPATIENT)
Dept: CARDIOLOGY CLINIC | Age: 69
End: 2019-08-26

## 2019-08-26 VITALS
WEIGHT: 174 LBS | OXYGEN SATURATION: 97 % | SYSTOLIC BLOOD PRESSURE: 134 MMHG | DIASTOLIC BLOOD PRESSURE: 87 MMHG | BODY MASS INDEX: 34.16 KG/M2 | HEIGHT: 60 IN

## 2019-08-26 DIAGNOSIS — Z45.09 ENCOUNTER FOR LOOP RECORDER CHECK: ICD-10-CM

## 2019-08-26 DIAGNOSIS — I48.0 PAF (PAROXYSMAL ATRIAL FIBRILLATION) (HCC): Primary | ICD-10-CM

## 2019-08-26 DIAGNOSIS — I10 ESSENTIAL HYPERTENSION: ICD-10-CM

## 2019-08-26 DIAGNOSIS — I63.432 CEREBROVASCULAR ACCIDENT (CVA) DUE TO EMBOLISM OF LEFT POSTERIOR CEREBRAL ARTERY (HCC): ICD-10-CM

## 2019-08-26 DIAGNOSIS — Z86.73 HISTORY OF CVA (CEREBROVASCULAR ACCIDENT): ICD-10-CM
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
## 2019-08-26 NOTE — PATIENT INSTRUCTIONS
Stop Plavix and Aspirin   Start xarelto 20 mg daily      Dr Juan Telles office will contact you for loop removal

## 2019-08-27 ENCOUNTER — PATIENT OUTREACH (OUTPATIENT)
Dept: FAMILY MEDICINE CLINIC | Age: 69
End: 2019-08-27

## 2019-08-27 NOTE — Clinical Note
Good morning! Patient scheduled to see you on 9/16/19. Dr. Harvey Ortiz stopped plavix and asa. Started her on Xarelto. He will remove loop recorder implantation. I wasn't sure if she needed a pre op clearance. I scheduled her for a regular follow up for now. There isn't a date yet for the procedure.

## 2019-08-27 NOTE — PROGRESS NOTES
Transition Of Care Follow Up    Patient outreach made to patient. I spoke with her . He states she is doing fine. They saw cardiology yesterday. Dr. Torrie Hadley will have his office to schedule removal of loop recorder implantation, he discontinued ASA, Plavix and started Xarelto. I scheduled an appointment to to see dr. Alyssa Ellis in September. No assistance is needed at this time from [facility]. Goals Addressed                 This Visit's Progress     Attends follow-up appointments as directed. On track     Patient will follow up with cardiology and pcp        Returns to baseline activity level.    On track        Future Appointments   Date Time Provider Mp Oleary   9/16/2019  3:00 PM Mitchell Spicer MD [facility]   10/9/2019  2:20 PM CSI, PACER  [facility]   3/3/2020  3:00 PM Clyde Gregg MD [address] [place] [address]

## 2019-08-29 ENCOUNTER — TELEPHONE ANTICOAG (OUTPATIENT)
Dept: CARDIOLOGY CLINIC | Age: 69
End: 2019-08-29

## 2019-08-29 PROBLEM — B37.81 CANDIDA ESOPHAGITIS (HCC): Status: ACTIVE | Noted: 2019-08-29

## 2019-08-30 ENCOUNTER — TELEPHONE (OUTPATIENT)
Dept: CARDIOLOGY CLINIC | Age: 69
End: 2019-08-30

## 2019-08-30 NOTE — TELEPHONE ENCOUNTER
PRICILLA for patient to call office.  I want to offer for Dr. Bradly Li to do her Loop Explant @ [facility] on 9/4/19 @ 2:00pm.

## 2019-09-03 ENCOUNTER — TELEPHONE (OUTPATIENT)
Dept: CARDIOLOGY CLINIC | Age: 69
End: 2019-09-03

## 2019-09-03 ENCOUNTER — PATIENT OUTREACH (OUTPATIENT)
Dept: FAMILY MEDICINE CLINIC | Age: 69
End: 2019-09-03

## 2019-09-03 DIAGNOSIS — I48.0 PAF (PAROXYSMAL ATRIAL FIBRILLATION) (HCC): Primary | ICD-10-CM

## 2019-09-03 NOTE — TELEPHONE ENCOUNTER
[address]           Patient  EP Instructions                  1. You are scheduled to have a Loop Recorder Explant on  September 4, 2019 , at 0300 pm.    Please check in at 0100 pm.    2. Please go to [facility] and park in the outpatient parking lot that is located around to the back of the hospital. The  will either give you directions or assist you in getting to the cath holding area. 3.  You are not to eat or drink anything after midnight the night before your               procedure. 4. Please continue to take your medications with a small sip of water on the morning of the procedure with the following exceptions:  Hold Xarelto the morning of procedure. Hold Lantus and Glucotrol the morning of your procedure. 5. If you are diabetic, do not take your insulin/sugar pill the morning of the procedure. 6. We encourage families to wait in the waiting room on the first floor while the procedure is being done. The Doctor will come out and talk with you as soon as the procedure is over. 7. There is the possibility that you may spend the night in the hospital, depending on the results of the procedure. This will be determined after the procedure is done. 8.   If you or your family have any questions, please call our office Monday-Friday 9:00am         -4:30 pm , at [phone], and ask to speak to one of the nurses.

## 2019-09-03 NOTE — TELEPHONE ENCOUNTER
Contacted health help. Two patient Identifiers confirmed. PA initiated. Approval # [id number] from 09/04/2019 through 10/04/2019. No other issues noted.

## 2019-09-03 NOTE — TELEPHONE ENCOUNTER
Spoke with patient's  . ..  Informed him that the [facility] would like to do her procedure @ 1:00pm instead of 3:00pm. He is aware that she will have to check in @ 11:00am.

## 2019-09-03 NOTE — TELEPHONE ENCOUNTER
Dr. Lynell Meckel patient is scheduled for a Loop Explant on 9/4/19 @ 3:00pm with Dr. Ev Patino @ [facility]. Loan Garcia could you please call her with instructions. (Dr. Ev Patino already scheduled for a procedure at [facility] on 9/4/19 . Gunner Challenger So he would like to do it on the same day)           Elizabet Svetlana. Bernis Fabry  Female, 77 y.o., 1950  Weight:   78.9 kg (174 lb)  MRN:   [id number]  Phone:   [phone] Carlene Fraire)  PCP:   Danyelle Pleitez MD  Primary Cvg:   [employer] (MEDICARE)/[employer] - CHOICE (MEDICARE REPLACEMENT PPO)  Last Appt With Me  Next Appt With Me  None  Next Appt  9/16/19 - Danyelle Pleitez MD - [facility]  Message   Received: 4 days ago   Message Contents   Gissel Borrego MD   Cc: Stephanie Albert             I forgot to check with you about this yesterday. Any day that you prefer?     Previous Messages      ----- Message -----   From: Stephanie Albert   Sent: 8/26/2019   3:49 PM EDT   To: Rudi Rice MD, Chalino Sutton     Please let me know what day you are able to go to [facility].       ----- Message -----   From: Kaushik Devine LPN   Sent: 7/56/8216   3:39 PM EDT   To: Gerhard Marino would like to schedule the pt with Dr Ev Patino for a loop removal.

## 2019-09-03 NOTE — PROGRESS NOTES
Transition Of care Follow Up    Call place to patient's . I spoke with him about upcoming appointment for 9/4 at 2 pm with cardiology to remove the loop implant. He states his wife has not checked her messages. I gave him the appointment information and contact numbers to call to confirm or change/cancel the appointment. Patient has graduated from the Transitions of Care Coordination  program on 9/3/19. Patient's symptoms are stable at this time. Patient/family has the ability to self-manage. Care management goals have been completed at this time. No further nurse navigator follow up scheduled. Goals Addressed                 This Visit's Progress     COMPLETED: Attends follow-up appointments as directed. Patient will follow up with cardiology and pcp        COMPLETED: Returns to baseline activity level. Pt has nurse navigator's contact information for any further questions, concerns, or needs.   Patients upcoming visits:    Future Appointments   Date Time Provider Mp Oleary   9/16/2019  3:00 PM Giancarlo Coulter MD [address]   10/9/2019  2:20 PM CSI, PACER  [facility]   3/3/2020  3:00 PM Ashia Nazario  [facility] [address]

## 2019-09-04 ENCOUNTER — HOSPITAL ENCOUNTER (OUTPATIENT)
Age: 69
Setting detail: OUTPATIENT SURGERY
Discharge: HOME | End: 2019-09-04
Attending: INTERNAL MEDICINE | Admitting: INTERNAL MEDICINE
Payer: MEDICARE

## 2019-09-04 VITALS
RESPIRATION RATE: 18 BRPM | WEIGHT: 170 LBS | DIASTOLIC BLOOD PRESSURE: 70 MMHG | TEMPERATURE: 98.7 F | HEIGHT: 60 IN | SYSTOLIC BLOOD PRESSURE: 121 MMHG | BODY MASS INDEX: 33.38 KG/M2 | OXYGEN SATURATION: 95 % | HEART RATE: 87 BPM

## 2019-09-04 DIAGNOSIS — I63.9 CRYPTOGENIC STROKE (HCC): ICD-10-CM

## 2019-09-04 LAB
GLUCOSE BLD STRIP.AUTO-MCNC: 316 MG/DL (ref 70–110)
GLUCOSE BLD STRIP.AUTO-MCNC: 367 MG/DL (ref 70–110)

## 2019-09-04 PROCEDURE — 77030002933 HC SUT MCRYL J&J -A: Performed by: INTERNAL MEDICINE

## 2019-09-04 PROCEDURE — 33286 RMVL SUBQ CAR RHYTHM MNTR: CPT | Performed by: INTERNAL MEDICINE

## 2019-09-04 PROCEDURE — 82962 GLUCOSE BLOOD TEST: CPT

## 2019-09-04 PROCEDURE — 74011250636 HC RX REV CODE- 250/636: Performed by: INTERNAL MEDICINE

## 2019-09-04 PROCEDURE — 74011636637 HC RX REV CODE- 636/637: Performed by: INTERNAL MEDICINE

## 2019-09-04 PROCEDURE — 74011000250 HC RX REV CODE- 250: Performed by: INTERNAL MEDICINE

## 2019-09-04 RX ORDER — SODIUM CHLORIDE 0.9 % (FLUSH) 0.9 %
5-40 SYRINGE (ML) INJECTION AS NEEDED
Status: DISCONTINUED | OUTPATIENT
Start: 2019-09-04 | End: 2019-09-04 | Stop reason: HOSPADM

## 2019-09-04 RX ORDER — LIDOCAINE HYDROCHLORIDE 10 MG/ML
INJECTION INFILTRATION; PERINEURAL AS NEEDED
Status: DISCONTINUED | OUTPATIENT
Start: 2019-09-04 | End: 2019-09-04 | Stop reason: HOSPADM

## 2019-09-04 RX ORDER — OXYCODONE HYDROCHLORIDE 5 MG/1
5 TABLET ORAL
Status: DISCONTINUED | OUTPATIENT
Start: 2019-09-04 | End: 2019-09-04 | Stop reason: HOSPADM

## 2019-09-04 RX ORDER — SODIUM CHLORIDE 0.9 % (FLUSH) 0.9 %
5-40 SYRINGE (ML) INJECTION EVERY 8 HOURS
Status: DISCONTINUED | OUTPATIENT
Start: 2019-09-04 | End: 2019-09-04 | Stop reason: HOSPADM

## 2019-09-04 RX ORDER — INSULIN LISPRO 100 [IU]/ML
INJECTION, SOLUTION INTRAVENOUS; SUBCUTANEOUS ONCE
Status: COMPLETED | OUTPATIENT
Start: 2019-09-04 | End: 2019-09-04

## 2019-09-04 RX ORDER — SODIUM CHLORIDE 9 MG/ML
500 INJECTION, SOLUTION INTRAVENOUS CONTINUOUS
Status: DISCONTINUED | OUTPATIENT
Start: 2019-09-04 | End: 2019-09-04 | Stop reason: HOSPADM

## 2019-09-04 RX ADMIN — SODIUM CHLORIDE 1000 ML: 900 INJECTION, SOLUTION INTRAVENOUS at 12:05

## 2019-09-04 RX ADMIN — INSULIN LISPRO 10 UNITS: 100 INJECTION, SOLUTION INTRAVENOUS; SUBCUTANEOUS at 12:27

## 2019-09-04 NOTE — Clinical Note
TRANSFER - IN REPORT:  
 
Verbal report received from: Andrés Devine. Report consisted of patient's Situation, Background, Assessment and  
Recommendations(SBAR). Opportunity for questions and clarification was provided. Assessment completed upon patient's arrival to unit and care assumed. Patient transported with a Cardiac Cath Tech / Patient Care Tech.

## 2019-09-04 NOTE — PERIOP NOTES
WILBUR.  Pt ok to be discharged to home/treat at home for [facility] of 316 per . Pt was treated earlier w/10units @1227 for a BS of 367. Denies sxs. Asymptomatic. Communicated to  re: BS.   To follow home regimen

## 2019-09-04 NOTE — H&P
[facility] - Consult Note    Consultation request by Dr. Blake Tavarez for ILR explant  Date of Encounter:  9/4/19   Primary Care Physician:  Yadira Berg MD     Assessment:     -Subcutaneous loop recorder in place, scheduled explant with diagnosis identified  -Paroxysmal A.fib, recently started Xarelto  -HTN  -Echo 9/2017 with normal EF, NST 2016, no ischemia  -DM  -LBBB  -HLD    Primary cardiologist Dr. Cristy Truong:     Risks, benefit, alternatives of explant of ILR discussed, all questions answered, will proceed to explant. Took Xarelto yesterday, none today. History of Present Illness: This is a 77 y.o. female admitted for [facility]ogenic [facility] ([facility] [I63.9]. Patient complains of:    Referred for subcutaneous loop explant. Initially placed for stroke.       Review of Symptoms:  Except as stated above include:  Constitutional:  Negative  Ears, nose, throat:  Negative  Respiratory:  negative  Cardiovascular:  negative  Gastrointestinal: negative  Genitourinary:  negative  Musculoskeletal:  Negative  Neurological:  Negative  Dermatological:  Negative  Hematological: Negative  Endocrinological: Negative  Allergy: Negative  Psychological:  Negative         Past Medical History:     Past Medical History:   Diagnosis Date    A[facility]fib ([facility]     Adverse effect of anesthesia      Last 2 surgeries developed CHF    Anxiety     Arthritis     Breast CA ([facility] 1999    Mastectomy and chemo and XRT and also reconstruction    Common migraine     CVA (cerebral vascular accident) ([facility] 10/2018    Depression     Diabetes mellitus     Gastroparesis     HTN (hypertension)     Hypercholesterolemia     Lumbago     Menopause     Old MI (myocardial infarction) 2005    silent MI    Pancreatitis     Scoliosis     Uterine prolapse     Vitamin D deficiency          Social History:     Social History     Socioeconomic History    Marital status:      Spouse name: Not on file    Number of children: Not on file    Years of education: Not on file    Highest education level: Not on file   Tobacco Use    Smoking status: Current Every Day Smoker     Packs/day: 0.50     Years: 35.00     Pack years: 17.50     Types: Cigarettes    Smokeless tobacco: Never Used   Substance and Sexual Activity    Alcohol use: No    Drug use: No    Sexual activity: Yes     Partners: Male     Birth control/protection: Surgical        Family History:     Family History   Problem Relation Age of Onset    Hypertension Maternal Grandmother     High Cholesterol Maternal Grandmother     Thyroid Disease Maternal Grandmother     Heart Attack Maternal Grandmother     Stroke Maternal Grandmother     Headache Maternal Grandmother     Tuberculosis Mother     Hypertension Mother     Tuberculosis Maternal Grandfather     Hypertension Sister     Cancer Sister         1 sister with uterine CA 1 sister with ovarian        Medications: Allergies   Allergen Reactions    Percocet [Oxycodone-Acetaminophen] Rash and Itching     Can Take Percocet but must take Benadryl for itching     Perfume Ht52 Rash     Perfume specific:  MUSK    Pravastatin Itching     Not sure, pt denies        No current facility-administered medications for this encounter. Current Outpatient Medications   Medication Sig    rivaroxaban (XARELTO) 20 mg tab tablet Take 1 Tab by mouth daily.  lidocaine (LIDODERM) 5 % Apply patch to the affected area for 12 hours a day and remove for 12 hours a day.     DULoxetine (CYMBALTA) 30 mg capsule TAKE 1 CAPSULE BY MOUTH ONCE DAILY AT BEDTIME FOR 7 DAYS, THEN INCREASE TO 2 ONCE DAILY AT BEDTIME THEREAFTER    lisinopril (PRINIVIL, ZESTRIL) 10 mg tablet TAKE 1 TABLET BY MOUTH ONCE DAILY    pantoprazole (PROTONIX) 40 mg tablet TAKE 1 TABLET BY MOUTH ONCE DAILY    carvedilol (COREG) 12.5 mg tablet TAKE 1 TABLET BY MOUTH TWICE DAILY WITH  MEALS    glipiZIDE (GLUCOTROL) 10 mg tablet TAKE 1 TABLET BY MOUTH TWICE DAILY    gabapentin (NEURONTIN) 100 mg capsule Take  by mouth three (3) times daily.  insulin glargine (LANTUS) 100 unit/mL injection 80 Units by SubCUTAneous route daily.  insulin aspart U-100 (NOVOLOG) 100 unit/mL injection 25 Units by SubCUTAneous route Before breakfast, lunch, and dinner. pens    promethazine (PHENERGAN) 25 mg tablet TAKE 1 TABLET BY MOUTH EVERY 8 HOURS AS NEEDED FOR NAUSEA    polyethylene glycol (MIRALAX) 17 gram packet Mix 1 packet (17g) into 4-8 ounces of beverage and drink once or twice daily. Results in 2-4 days, Max treatment length of 2 weeks.  dicyclomine (BENTYL) 10 mg capsule Take 1 Cap by mouth four (4) times daily as needed.  atorvastatin (LIPITOR) 40 mg tablet Take 1 Tab by mouth daily.  ferrous sulfate (IRON) 325 mg (65 mg iron) tablet Take 325 mg by mouth Daily (before breakfast). Indications: Iron Deficiency Anemia    alendronate (FOSAMAX) 70 mg tablet Take 1 Tab by mouth every seven (7) days. Physical Exam:   There were no vitals taken for this visit. BP Readings from Last 3 Encounters:   08/26/19 134/87   07/30/19 (!) 153/94   04/17/19 (!) 71/56     Pulse Readings from Last 3 Encounters:   07/30/19 (!) 117   04/17/19 85   04/04/19 (!) 110     Wt Readings from Last 3 Encounters:   08/26/19 78.9 kg (174 lb)   07/30/19 71.2 kg (157 lb)   04/17/19 78 kg (172 lb)       General:  alert, cooperative, no distress, appears stated age  Neck:  nontender  Lungs:  clear to auscultation bilaterally  Heart:  regular rate and rhythm, S1, S2 normal, no murmur, click, rub or gallop, ILR intact  Abdomen:  abdomen is soft without significant tenderness, masses, organomegaly or guarding  Extremities:  extremities normal, atraumatic, no cyanosis or edema  Skin: Warm and dry.  no hyperpigmentation, vitiligo, or suspicious lesions  Neuro: alert, oriented x3, affect appropriate, no focal neurological deficits, moves all extremities well, no involuntary movements, reflexes at knee and ankle intact  Psych: non focal     Data Review:     No results for input(s): WBC, HGB, HCT, PLT, HGBEXT, HCTEXT, PLTEXT in the last 72 hours. No results for input(s): NA, K, CL, CO2, GLU, BUN, CREA, CA, MG, PHOS, ALB, TBIL, SGOT, ALT, INR in the last 72 hours. No lab exists for component:  BNP, INREXT    Results for orders placed or performed during the hospital encounter of 07/30/19   EKG, 12 LEAD, INITIAL   Result Value Ref Range    Ventricular Rate 119 BPM    Atrial Rate 119 BPM    P-R Interval 200 ms    QRS Duration 152 ms    Q-T Interval 338 ms    QTC Calculation (Bezet) 475 ms    Calculated R Axis -45 degrees    Calculated T Axis 116 degrees    Diagnosis       Sinus tachycardia  Left axis deviation  Left bundle branch block  Abnormal ECG  When compared with ECG of 28-APR-2016 07:47,  No significant change was found  Confirmed by Jillian Chamorro ([id number]) on 7/31/2019 8:53:44 PM     Results for orders placed or performed in visit on 11/19/13   AMB POC EKG ROUTINE W/ 12 LEADS, INTER & REP    Impression    See progress note.        All Cardiac Markers in the last 24 hours:  No results found for: CPK, CK, CKMMB, CKMB, RCK3, CKMBT, CKNDX, CKND1, EUNICE, TROPT, TROIQ, VELIA, TROPT, TNIPOC, BNP, BNPP    Last Lipid:    Lab Results   Component Value Date/Time    Cholesterol, total 169 02/28/2018 12:00 AM    HDL Cholesterol 42 02/28/2018 12:00 AM    LDL, calculated 86 02/28/2018 12:00 AM    Triglyceride 207 (H) 02/28/2018 12:00 AM    CHOL/HDL Ratio 3.7 02/19/2017 05:01 AM       Signed By: Jammie Nina MD     September 4, 2019

## 2019-09-04 NOTE — DISCHARGE INSTRUCTIONS
Remove dressing in 24 hours from incision. DISCHARGE SUMMARY from Nurse    PATIENT INSTRUCTIONS:    After general anesthesia or intravenous sedation, for 24 hours or while taking prescription Narcotics:  · Limit your activities  · Do not drive and operate hazardous machinery  · Do not make important personal or business decisions  · Do  not drink alcoholic beverages  · If you have not urinated within 8 hours after discharge, please contact your surgeon on call. Report the following to your surgeon:  · Excessive pain, swelling, redness or odor of or around the surgical area  · Temperature over 100.5  · Nausea and vomiting lasting longer than 4 hours or if unable to take medications  · Any signs of decreased circulation or nerve impairment to extremity: change in color, persistent  numbness, tingling, coldness or increase pain  · Any questions    What to do at Home:    These are general instructions for a healthy lifestyle:    No smoking/ No tobacco products/ Avoid exposure to second hand smoke  Surgeon General's Warning:  Quitting smoking now greatly reduces serious risk to your health. Obesity, smoking, and sedentary lifestyle greatly increases your risk for illness    A healthy diet, regular physical exercise & weight monitoring are important for maintaining a healthy lifestyle    You may be retaining fluid if you have a history of heart failure or if you experience any of the following symptoms:  Weight gain of 3 pounds or more overnight or 5 pounds in a week, increased swelling in our hands or feet or shortness of breath while lying flat in bed. Please call your doctor as soon as you notice any of these symptoms; do not wait until your next office visit. The discharge information has been reviewed with the patient. The patient verbalized understanding.   Discharge medications reviewed with the patient and appropriate educational materials and side effects teaching were provided. ___________________________________________________________________________________________________________________________________    Patient armband removed and given to patient to take home.   Patient was informed of the privacy risks if armband lost or stolen

## 2019-09-04 NOTE — PERIOP NOTES
Pre-Op Summary    Pt arrived via car with family/friend and is oriented to time, place, person and situation. Patient with unsteady gait with wheelchair assistive devices. Visit Vitals  /83   Pulse 99   Temp 98.7 °F (37.1 °C)   Resp 18   Ht 5' (1.524 m)   Wt 77.1 kg (170 lb)   SpO2 98%   BMI 33.20 kg/m²       Peripheral IV located on Right forearm. Patients belongings are located with . Patient's point of contact is  Darvin Noriega and their contact number is: [phone]. They will be in the waiting room. They are able to receive medication information. They will be their ride home.

## 2019-09-04 NOTE — Clinical Note
TRANSFER - OUT REPORT:  
 
Verbal report given to: [facility] RN. Report consisted of patient's Situation, Background, Assessment and  
Recommendations(SBAR). Opportunity for questions and clarification was provided. Patient transported with a Cardiac Cath Tech / Patient Care Tech. Patient transported to: [facility].

## 2019-09-11 ENCOUNTER — OFFICE VISIT (OUTPATIENT)
Dept: CARDIOLOGY CLINIC | Age: 69
End: 2019-09-11

## 2019-09-11 DIAGNOSIS — Z45.09 ENCOUNTER FOR LOOP RECORDER CHECK: ICD-10-CM

## 2019-09-11 DIAGNOSIS — I48.0 PAF (PAROXYSMAL ATRIAL FIBRILLATION) (HCC): Primary | ICD-10-CM

## 2019-09-16 ENCOUNTER — OFFICE VISIT (OUTPATIENT)
Dept: FAMILY MEDICINE CLINIC | Age: 69
End: 2019-09-16

## 2019-09-16 VITALS
SYSTOLIC BLOOD PRESSURE: 113 MMHG | WEIGHT: 171.6 LBS | OXYGEN SATURATION: 96 % | RESPIRATION RATE: 20 BRPM | DIASTOLIC BLOOD PRESSURE: 77 MMHG | BODY MASS INDEX: 33.69 KG/M2 | HEIGHT: 60 IN | TEMPERATURE: 99 F | HEART RATE: 93 BPM

## 2019-09-16 DIAGNOSIS — E11.49 TYPE 2 DIABETES MELLITUS WITH OTHER NEUROLOGIC COMPLICATION, WITH LONG-TERM CURRENT USE OF INSULIN (HCC): Primary | ICD-10-CM

## 2019-09-16 DIAGNOSIS — Z79.4 TYPE 2 DIABETES MELLITUS WITH OTHER NEUROLOGIC COMPLICATION, WITH LONG-TERM CURRENT USE OF INSULIN (HCC): Primary | ICD-10-CM

## 2019-09-16 DIAGNOSIS — N18.30 STAGE 3 CHRONIC KIDNEY DISEASE (HCC): ICD-10-CM

## 2019-09-16 DIAGNOSIS — Z91.199 NONCOMPLIANCE: ICD-10-CM

## 2019-09-16 DIAGNOSIS — Z23 ENCOUNTER FOR IMMUNIZATION: ICD-10-CM

## 2019-09-16 PROBLEM — M54.16 LEFT LUMBAR RADICULOPATHY: Status: RESOLVED | Noted: 2018-01-09 | Resolved: 2019-09-16

## 2019-09-16 PROBLEM — I63.432 CEREBROVASCULAR ACCIDENT (CVA) DUE TO EMBOLISM OF LEFT POSTERIOR CEREBRAL ARTERY (HCC): Status: RESOLVED | Noted: 2018-09-13 | Resolved: 2019-09-16

## 2019-09-16 PROBLEM — I44.7 LBBB (LEFT BUNDLE BRANCH BLOCK): Status: ACTIVE | Noted: 2019-09-16

## 2019-09-16 LAB — HBA1C MFR BLD HPLC: 11.6 %

## 2019-09-16 RX ORDER — GLIPIZIDE 10 MG/1
TABLET ORAL
Qty: 180 TAB | Refills: 0 | Status: SHIPPED | OUTPATIENT
Start: 2019-09-16 | End: 2019-10-25 | Stop reason: ALTCHOICE

## 2019-09-16 RX ORDER — INSULIN GLARGINE 100 [IU]/ML
80 INJECTION, SOLUTION SUBCUTANEOUS DAILY
Qty: 10 VIAL | Refills: 0 | Status: SHIPPED | OUTPATIENT
Start: 2019-09-16 | End: 2019-10-25 | Stop reason: ALTCHOICE

## 2019-09-16 RX ORDER — ATORVASTATIN CALCIUM 40 MG/1
40 TABLET, FILM COATED ORAL DAILY
Qty: 90 TAB | Refills: 3 | Status: SHIPPED | OUTPATIENT
Start: 2019-09-16 | End: 2019-10-25 | Stop reason: SDUPTHER

## 2019-09-16 RX ORDER — INSULIN ASPART 100 [IU]/ML
25 INJECTION, SOLUTION INTRAVENOUS; SUBCUTANEOUS
Qty: 80 ML | Refills: 0 | Status: SHIPPED | OUTPATIENT
Start: 2019-09-16 | End: 2019-09-17

## 2019-09-16 NOTE — PROGRESS NOTES
Immunization/s administered 9/16/2019 by Rafia James LPN with guardian's consent. Patient tolerated procedure well. No reactions noted. prevnar 13 and fluad rt deltoid.

## 2019-09-16 NOTE — PROGRESS NOTES
Assessment/Plan:    1. Type 2 diabetes mellitus with other neurologic complication, with long-term current use of insulin ([facility]  -doubt compliance with insulin. I advised pt that if she wants to start checking her bs daily, she can come back and I will adjust her meds. However, I can't adjust meds without a bs log.   - AMB POC HEMOGLOBIN A1C  - insulin glargine (LANTUS) 100 unit/mL injection; 80 Units by SubCUTAneous route daily. Dispense: 10 Vial; Refill: 0  - insulin aspart U-100 (NOVOLOG) 100 unit/mL injection; 25 Units by SubCUTAneous route Before breakfast, lunch, and dinner. pens  Dispense: 80 mL; Refill: 0  - HM DIABETES FOOT EXAM    2. Noncompliance    3. Stage 3 chronic kidney disease (HCC)  -slightly worsening function. 4. Encounter for immunization  - PNEUMOCOCCAL CONJ VACCINE 13 VALENT IM  - ADMIN PNEUMOCOCCAL VACCINE  - INFLUENZA VACCINE INACTIVATED (IIV), SUBUNIT, ADJUVANTED, IM  - PA IMMUNIZ ADMIN,1 SINGLE/COMB VAC/TOXOID      The plan was discussed with the patient. The patient verbalized understanding and is in agreement with the plan. All medication potential side effects were discussed with the patient. Health Maintenance:   Health Maintenance   Topic Date Due    Pneumococcal 65+ years (2 of 2 - PCV13) 04/18/2017    MICROALBUMIN Q1  02/28/2019    LIPID PANEL Q1  02/28/2019    Influenza Age 9 to Adult  08/01/2019    MEDICARE YEARLY EXAM  08/09/2019    HEMOGLOBIN A1C Q6M  09/14/2019    EYE EXAM RETINAL OR DILATED  10/05/2019    Shingrix Vaccine Age 50> (1 of 2) 09/30/2020 (Originally 4/7/2001)    FOOT EXAM Q1  09/16/2020    GLAUCOMA SCREENING Q2Y  04/17/2021    BREAST CANCER SCRN MAMMOGRAM  05/20/2021    COLONOSCOPY  04/17/2024    DTaP/Tdap/Td series (2 - Td) 07/26/2026    Hepatitis C Screening  Completed    Bone Densitometry (Dexa) Screening  Completed       Josh Rodriguez is a 77 y.o. female and presents with Diabetes and Hypertension     Subjective:  DM2 - a1c 11.6. Pt has long h/o noncompliance. She states she's giving herself insulin, but I haven't refilled it since 4/2019. She doesn't check her bs at home, as we have previously discussed.  reports pt has polyuria. Didn't do labs ordered earlier this year. HTN- bp controlled. On lisinopril and coreg. ROS:  Constitutional: No recent weight change. No weakness/fatigue. No f/c. Cardiovascular: No CP/palpitations. No NINA/orthopnea/PND. Respiratory: No cough/sputum, dyspnea, wheezing. Gastointestinal: No dysphagia, reflux. No n/v. No constipation/diarrhea. No melena/rectal bleeding. The problem list was updated as a part of today's visit.   Patient Active Problem List   Diagnosis Code    Vitamin D deficiency E55.9    Hx of breast cancer Z85.3    Chronic pain syndrome K93.6    Diastolic CHF, chronic ([facility]) I50.32    GERD (gastroesophageal reflux disease) K21.9    Tobacco dependence F17.200    Chronic radicular lumbar pain M54.16, G89.29    Scoliosis M41.9    Essential hypertension I10    Pure hypercholesterolemia E78.00    CAD (coronary artery disease) I25.10    Spinal stenosis of lumbar region with neurogenic claudication M48.062    S/P lumbar fusion Z98.1    Osteoporosis M81.0    Hemianopsia H53.47    Reactive depression F32.9    Stenosis of left carotid artery I65.22    Carpal tunnel syndrome of right wrist G56.01    Type 2 diabetes mellitus with neurologic complication, with long-term current use of insulin ([facility]) E11.49, Z79.4    History of compression fracture of spine Z87.81    History of stroke Z86.73    Stage 3 chronic kidney disease ([facility]) N18.3    Diabetic nephropathy associated with type 2 diabetes mellitus ([facility]) E11.21    Full code status Z78.9    Noncompliance Z91.19    Functional diarrhea P49.8    Eosinophilic enteritis G39.02    NSAID induced gastritis K29.60, T39.395A    Candida esophagitis ([facility]) B37.81    LBBB (left bundle branch block) I44.7       The PSH, FH were reviewed. SH:  Social History     Tobacco Use    Smoking status: Current Every Day Smoker     Packs/day: 0.50     Years: 35.00     Pack years: 17.50     Types: Cigarettes    Smokeless tobacco: Never Used   Substance Use Topics    Alcohol use: No    Drug use: No       Medications/Allergies:  Current Outpatient Medications on File Prior to Visit   Medication Sig Dispense Refill    rivaroxaban (XARELTO) 20 mg tab tablet Take 1 Tab by mouth daily. 90 Tab 3    lidocaine (LIDODERM) 5 % Apply patch to the affected area for 12 hours a day and remove for 12 hours a day. 30 Each 0    DULoxetine (CYMBALTA) 30 mg capsule TAKE 1 CAPSULE BY MOUTH ONCE DAILY AT BEDTIME FOR 7 DAYS, THEN INCREASE TO 2 ONCE DAILY AT BEDTIME THEREAFTER 60 Cap 2    lisinopril (PRINIVIL, ZESTRIL) 10 mg tablet TAKE 1 TABLET BY MOUTH ONCE DAILY 90 Tab 0    pantoprazole (PROTONIX) 40 mg tablet TAKE 1 TABLET BY MOUTH ONCE DAILY 90 Tab 0    carvedilol (COREG) 12.5 mg tablet TAKE 1 TABLET BY MOUTH TWICE DAILY WITH  MEALS 180 Tab 0    glipiZIDE (GLUCOTROL) 10 mg tablet TAKE 1 TABLET BY MOUTH TWICE DAILY 180 Tab 0    gabapentin (NEURONTIN) 100 mg capsule Take  by mouth three (3) times daily.  insulin glargine (LANTUS) 100 unit/mL injection 80 Units by SubCUTAneous route daily. 10 Vial 0    insulin aspart U-100 (NOVOLOG) 100 unit/mL injection 25 Units by SubCUTAneous route Before breakfast, lunch, and dinner. pens 80 mL 0    promethazine (PHENERGAN) 25 mg tablet TAKE 1 TABLET BY MOUTH EVERY 8 HOURS AS NEEDED FOR NAUSEA 30 Tab 0    polyethylene glycol (MIRALAX) 17 gram packet Mix 1 packet (17g) into 4-8 ounces of beverage and drink once or twice daily. Results in 2-4 days, Max treatment length of 2 weeks.  dicyclomine (BENTYL) 10 mg capsule Take 1 Cap by mouth four (4) times daily as needed. 120 Cap 1    atorvastatin (LIPITOR) 40 mg tablet Take 1 Tab by mouth daily.  90 Tab 3    alendronate (FOSAMAX) 70 mg tablet Take 1 Tab by mouth every seven (7) days. 30 Tab 1    ferrous sulfate (IRON) 325 mg (65 mg iron) tablet Take 325 mg by mouth Daily (before breakfast). Indications: Iron Deficiency Anemia       No current facility-administered medications on file prior to visit. Allergies   Allergen Reactions    Percocet [Oxycodone-Acetaminophen] Rash and Itching     Can Take Percocet but must take Benadryl for itching     Perfume Ht52 Rash     Perfume specific:  MUSK    Pravastatin Itching     Not sure, pt denies       Objective:  Visit Vitals  /77 (BP 1 Location: Left arm, BP Patient Position: Sitting)   Pulse 93   Temp 99 °F (37.2 °C) (Oral)   Resp 20   Ht 5' (1.524 m)   Wt 171 lb 9.6 oz (77.8 kg)   SpO2 96%   BMI 33.51 kg/m²      Constitutional: Well developed, nourished, no distress, alert, obese habitus   CV: S1, S2.  RRR. No murmurs/rubs. No edema. Pulm: No abnormalities on inspection. Clear to auscultation bilaterally. No wheezing/rhonchi. Normal effort. Neuro: Speech dysarthric. Psych: blunt affect. Short-term memory intact. Monofilament nml  Labwork and Ancillary Studies:    CBC w/Diff  Lab Results   Component Value Date/Time    WBC 7.8 07/30/2019 02:15 PM    HGB 13.8 07/30/2019 02:15 PM    PLATELET 335 61/69/6920 02:15 PM         Basic Metabolic Profile/LFTs  Lab Results   Component Value Date/Time    Sodium 132 (L) 07/30/2019 02:15 PM    Potassium 4.0 07/30/2019 02:15 PM    Chloride 100 07/30/2019 02:15 PM    CO2 23 07/30/2019 02:15 PM    Anion gap 9 07/30/2019 02:15 PM    Glucose 566 (HH) 07/30/2019 02:15 PM    BUN 30 (H) 07/30/2019 02:15 PM    Creatinine 1.32 (H) 07/30/2019 02:15 PM    BUN/Creatinine ratio 23 (H) 07/30/2019 02:15 PM    GFR est AA 49 (L) 07/30/2019 02:15 PM    GFR est non-AA 40 (L) 07/30/2019 02:15 PM    Calcium 9.1 07/30/2019 02:15 PM      Lab Results   Component Value Date/Time    ALT (SGPT) 26 07/30/2019 02:15 PM    AST (SGOT) 17 07/30/2019 02:15 PM    Alk.  phosphatase 143 (H) 07/30/2019 02:15 PM    Bilirubin, direct <0.1 04/28/2016 03:42 AM    Bilirubin, total 0.3 07/30/2019 02:15 PM       Cholesterol  Lab Results   Component Value Date/Time    Cholesterol, total 169 02/28/2018 12:00 AM    HDL Cholesterol 42 02/28/2018 12:00 AM    LDL, calculated 86 02/28/2018 12:00 AM    Triglyceride 207 (H) 02/28/2018 12:00 AM    CHOL/HDL Ratio 3.7 02/19/2017 05:01 AM

## 2019-09-16 NOTE — PROGRESS NOTES
Carrie Rogel is a 77 y.o. female (: 1950) presenting to address:    Chief Complaint   Patient presents with    Diabetes    Hypertension       Vitals:    19 1502   BP: 113/77   Pulse: 93   Resp: 20   Temp: 99 °F (37.2 °C)   TempSrc: Oral   SpO2: 96%   Weight: 171 lb 9.6 oz (77.8 kg)   Height: 5' (1.524 m)   PainSc:   0 - No pain       Learning Assessment:     Learning Assessment 10/22/2015   PRIMARY LEARNER Patient   HIGHEST LEVEL OF EDUCATION - PRIMARY LEARNER  -   BARRIERS PRIMARY LEARNER -   CO-LEARNER CAREGIVER -   PRIMARY LANGUAGE ENGLISH   LEARNER PREFERENCE PRIMARY READING   ANSWERED BY pt   RELATIONSHIP SELF     Depression Screening:     3 most recent PHQ Screens 2019   PHQ Not Done -   Little interest or pleasure in doing things Not at all   Feeling down, depressed, irritable, or hopeless Several days   Total Score PHQ 2 1   Trouble falling or staying asleep, or sleeping too much -   Feeling tired or having little energy -   Poor appetite, weight loss, or overeating -   Feeling bad about yourself - or that you are a failure or have let yourself or your family down -   Trouble concentrating on things such as school, work, reading, or watching TV -   Moving or speaking so slowly that other people could have noticed; or the opposite being so fidgety that others notice -   Thoughts of being better off dead, or hurting yourself in some way -   PHQ 9 Score -   How difficult have these problems made it for you to do your work, take care of your home and get along with others -     Fall Risk Assessment:     Fall Risk Assessment, last 12 mths 2019   Able to walk? Yes   Fall in past 12 months? Yes   Fall with injury? Yes   Number of falls in past 12 months 1   Fall Risk Score 2     Abuse Screening:     Abuse Screening Questionnaire 2018   Do you ever feel afraid of your partner? N   Are you in a relationship with someone who physically or mentally threatens you?  N   Is it safe for you to go home? Y     Coordination of Care Questionaire:   1. Have you been to the ER, urgent care clinic since your last visit? Hospitalized since your last visit? YES- [facility]    2. Have you seen or consulted any other health care providers outside of the [address] [place] [address] since your last visit? Include any pap smears or colon screening. NO    Advanced Directive:   1. Do you have an Advanced Directive? YES    2. Would you like information on Advanced Directives?  NO

## 2019-09-17 DIAGNOSIS — E11.49 TYPE 2 DIABETES MELLITUS WITH OTHER NEUROLOGIC COMPLICATION, WITH LONG-TERM CURRENT USE OF INSULIN (HCC): ICD-10-CM

## 2019-09-17 DIAGNOSIS — Z79.4 TYPE 2 DIABETES MELLITUS WITH OTHER NEUROLOGIC COMPLICATION, WITH LONG-TERM CURRENT USE OF INSULIN (HCC): ICD-10-CM

## 2019-09-17 RX ORDER — INSULIN ASPART 100 [IU]/ML
25 INJECTION, SOLUTION INTRAVENOUS; SUBCUTANEOUS
Qty: 80 ML | Refills: 0 | Status: SHIPPED | OUTPATIENT
Start: 2019-09-17 | End: 2019-10-25 | Stop reason: ALTCHOICE

## 2019-09-25 ENCOUNTER — TELEPHONE (OUTPATIENT)
Dept: FAMILY MEDICINE CLINIC | Age: 69
End: 2019-09-25

## 2019-09-25 DIAGNOSIS — E11.49 TYPE 2 DIABETES MELLITUS WITH OTHER NEUROLOGIC COMPLICATION, WITH LONG-TERM CURRENT USE OF INSULIN (HCC): Primary | ICD-10-CM

## 2019-09-25 DIAGNOSIS — Z79.4 TYPE 2 DIABETES MELLITUS WITH OTHER NEUROLOGIC COMPLICATION, WITH LONG-TERM CURRENT USE OF INSULIN (HCC): Primary | ICD-10-CM

## 2019-09-25 RX ORDER — ACETAMINOPHEN 160 MG/5ML
SUSPENSION, ORAL (FINAL DOSE FORM) ORAL
Qty: 400 SYRINGE | Refills: 3 | Status: SHIPPED | OUTPATIENT
Start: 2019-09-25

## 2019-09-25 NOTE — TELEPHONE ENCOUNTER
Per spouse pt uses vials. Needs syringes with needles attached. Send to [facility] please.

## 2019-09-25 NOTE — TELEPHONE ENCOUNTER
Patient requesting a script for needles for her insulin to be sent to [facility]. If the needles come from [facility] then she will not have to pay out of pocket.

## 2019-09-25 NOTE — TELEPHONE ENCOUNTER
Called pt, left msg. She takes insulin out of vials. Doesn't she need syringes with needles attached? Left msg for pt to call back.

## 2019-10-10 ENCOUNTER — TELEPHONE (OUTPATIENT)
Dept: FAMILY MEDICINE CLINIC | Age: 69
End: 2019-10-10

## 2019-10-10 NOTE — TELEPHONE ENCOUNTER
Jamir nurse was checking on pt. She tested her glucose 330. Nurse is asking if pt still takes metformin. I returned nurse Zulay's call, reached .

## 2019-10-25 ENCOUNTER — OFFICE VISIT (OUTPATIENT)
Dept: FAMILY MEDICINE CLINIC | Age: 69
End: 2019-10-25

## 2019-10-25 VITALS
DIASTOLIC BLOOD PRESSURE: 89 MMHG | TEMPERATURE: 98.4 F | RESPIRATION RATE: 16 BRPM | OXYGEN SATURATION: 90 % | HEART RATE: 110 BPM | BODY MASS INDEX: 32.39 KG/M2 | HEIGHT: 60 IN | SYSTOLIC BLOOD PRESSURE: 141 MMHG | WEIGHT: 165 LBS

## 2019-10-25 DIAGNOSIS — Z79.4 TYPE 2 DIABETES MELLITUS WITH OTHER NEUROLOGIC COMPLICATION, WITH LONG-TERM CURRENT USE OF INSULIN (HCC): Primary | ICD-10-CM

## 2019-10-25 DIAGNOSIS — F01.50 VASCULAR DEMENTIA WITHOUT BEHAVIORAL DISTURBANCE (HCC): ICD-10-CM

## 2019-10-25 DIAGNOSIS — I25.10 CORONARY ARTERY DISEASE INVOLVING NATIVE HEART WITHOUT ANGINA PECTORIS, UNSPECIFIED VESSEL OR LESION TYPE: ICD-10-CM

## 2019-10-25 DIAGNOSIS — I10 ESSENTIAL HYPERTENSION: ICD-10-CM

## 2019-10-25 DIAGNOSIS — M54.16 LEFT LUMBAR RADICULOPATHY: ICD-10-CM

## 2019-10-25 DIAGNOSIS — E11.49 TYPE 2 DIABETES MELLITUS WITH OTHER NEUROLOGIC COMPLICATION, WITH LONG-TERM CURRENT USE OF INSULIN (HCC): Primary | ICD-10-CM

## 2019-10-25 PROBLEM — Z91.199 NONCOMPLIANCE: Status: RESOLVED | Noted: 2019-01-15 | Resolved: 2019-10-25

## 2019-10-25 LAB — GLUCOSE POC: 415 MG/DL

## 2019-10-25 RX ORDER — ATORVASTATIN CALCIUM 40 MG/1
40 TABLET, FILM COATED ORAL
Qty: 90 TAB | Refills: 3 | Status: SHIPPED | OUTPATIENT
Start: 2019-10-25

## 2019-10-25 RX ORDER — CARVEDILOL 12.5 MG/1
TABLET ORAL
Qty: 180 TAB | Refills: 0 | Status: SHIPPED | OUTPATIENT
Start: 2019-10-25

## 2019-10-25 RX ORDER — DULOXETIN HYDROCHLORIDE 30 MG/1
30 CAPSULE, DELAYED RELEASE ORAL
Qty: 90 CAP | Refills: 2 | Status: SHIPPED | OUTPATIENT
Start: 2019-10-25

## 2019-10-25 RX ORDER — INSULIN ASPART 100 [IU]/ML
25 INJECTION, SUSPENSION SUBCUTANEOUS 2 TIMES DAILY
Qty: 15 ADJUSTABLE DOSE PRE-FILLED PEN SYRINGE | Refills: 0 | Status: SHIPPED | OUTPATIENT
Start: 2019-10-25

## 2019-10-25 RX ORDER — LISINOPRIL 10 MG/1
TABLET ORAL
Qty: 90 TAB | Refills: 0 | Status: SHIPPED | OUTPATIENT
Start: 2019-10-25

## 2019-10-25 RX ORDER — PANTOPRAZOLE SODIUM 40 MG/1
TABLET, DELAYED RELEASE ORAL
Qty: 90 TAB | Refills: 3 | Status: SHIPPED | OUTPATIENT
Start: 2019-10-25

## 2019-10-25 NOTE — PROGRESS NOTES
Assessment/Plan:    1. Type 2 diabetes mellitus with other neurologic complication, with long-term current use of insulin (HCC)  -given dementia, will try to simplify regimen. Will do 70/30.  will dose evening meds. F/u in 1mo. - AMB POC GLUCOSE, QUANTITATIVE, BLOOD  - insulin aspart protamine/insulin aspart (NOVOLOG MIX 70/30) 100 unit/mL (70-30) inpn; 25 Units by SubCUTAneous route two (2) times a day. Dispense: 15 Adjustable Dose Pre-filled Pen Syringe; Refill: 0    2. Vascular dementia without behavioral disturbance (HCC)  -score 15/30 moca    3. Essential hypertension and cad  -refilled. - lisinopril (PRINIVIL, ZESTRIL) 10 mg tablet; TAKE 1 TABLET BY MOUTH ONCE DAILY at dinner  Dispense: 90 Tab; Refill: 0  - carvedilol (COREG) 12.5 mg tablet; TAKE 1 TABLET BY MOUTH TWICE DAILY WITH  MEALS  Dispense: 180 Tab; Refill: 0    4. Left lumbar radiculopathy  - DULoxetine (CYMBALTA) 30 mg capsule; Take 1 Cap by mouth daily (with dinner). Dispense: 90 Cap; Refill: 2      The plan was discussed with the patient. The patient verbalized understanding and is in agreement with the plan. All medication potential side effects were discussed with the patient.     Health Maintenance:   Health Maintenance   Topic Date Due    MICROALBUMIN Q1  02/28/2019    LIPID PANEL Q1  02/28/2019    MEDICARE YEARLY EXAM  08/09/2019    EYE EXAM RETINAL OR DILATED  10/05/2019    Shingrix Vaccine Age 50> (1 of 2) 09/30/2020 (Originally 4/7/2001)    HEMOGLOBIN A1C Q6M  03/16/2020    FOOT EXAM Q1  09/16/2020    GLAUCOMA SCREENING Q2Y  04/17/2021    BREAST CANCER SCRN MAMMOGRAM  05/20/2021    COLONOSCOPY  04/17/2024    DTaP/Tdap/Td series (2 - Td) 07/26/2026    Hepatitis C Screening  Completed    Bone Densitometry (Dexa) Screening  Completed    Influenza Age 5 to Adult  Completed    Pneumococcal 65+ years  Completed       Harshad Kim is a 77 y.o. female and presents with Diabetes (Follow up)     Subjective:  DM2- bs today in office is 415. She's noncompliant with diabetic diet. Doesn't eat 3 meals/day. Skips insulin. She states she's compliant but  doesn't think she is taking it. ROS:  Constitutional: No recent weight change. No weakness/fatigue. No f/c. Cardiovascular: No CP/palpitations. No NINA/orthopnea/PND. Respiratory: No cough/sputum, dyspnea, wheezing. Gastointestinal: No dysphagia, reflux. No n/v. No constipation/diarrhea. No melena/rectal bleeding. Musculoskeletal: No joint pain/stiffness. No muscle pain/tenderness. Endo: No heat/cold intolerance,+ polyuria/polydypsia. The problem list was updated as a part of today's visit.   Patient Active Problem List   Diagnosis Code    Vitamin D deficiency E55.9    Hx of breast cancer Z85.3    Chronic pain syndrome L86.9    Diastolic CHF, chronic ([facility]) I50.32    GERD (gastroesophageal reflux disease) K21.9    Tobacco dependence F17.200    Chronic radicular lumbar pain M54.16, G89.29    Scoliosis M41.9    Essential hypertension I10    Pure hypercholesterolemia E78.00    CAD (coronary artery disease) I25.10    Spinal stenosis of lumbar region with neurogenic claudication M48.062    S/P lumbar fusion Z98.1    Osteoporosis M81.0    Hemianopsia H53.47    Reactive depression F32.9    Stenosis of left carotid artery I65.22    Carpal tunnel syndrome of right wrist G56.01    Type 2 diabetes mellitus with neurologic complication, with long-term current use of insulin ([facility]) E11.49, Z79.4    History of compression fracture of spine Z87.81    History of stroke Z86.73    Stage 3 chronic kidney disease ([facility]) N18.3    Diabetic nephropathy associated with type 2 diabetes mellitus ([facility]) E11.21    Full code status Z78.9    Noncompliance Z91.19    Functional diarrhea W98.2    Eosinophilic enteritis T21.17    NSAID induced gastritis K29.60, T39.395A    Candida esophagitis ([facility]) B37.81    LBBB (left bundle branch block) I44.7       The PSH, FH were reviewed. SH:  Social History     Tobacco Use    Smoking status: Current Every Day Smoker     Packs/day: 0.50     Years: 35.00     Pack years: 17.50     Types: Cigarettes    Smokeless tobacco: Never Used   Substance Use Topics    Alcohol use: No    Drug use: No       Medications/Allergies:  Current Outpatient Medications on File Prior to Visit   Medication Sig Dispense Refill    Insulin Syringe-Needle U-100 (BD INSULIN SYRINGE ULTRA-FINE) 0.3 mL 30 gauge x 1/2\" syrg Use with insulin qid. e11.65 400 Syringe 3    insulin aspart U-100 (NOVOLOG) 100 unit/mL injection 25 Units by SubCUTAneous route Before breakfast, lunch, and dinner. 80 mL 0    insulin glargine (LANTUS) 100 unit/mL injection 80 Units by SubCUTAneous route daily. 10 Vial 0    glipiZIDE (GLUCOTROL) 10 mg tablet TAKE 1 TABLET BY MOUTH TWICE DAILY 180 Tab 0    atorvastatin (LIPITOR) 40 mg tablet Take 1 Tab by mouth daily. 90 Tab 3    rivaroxaban (XARELTO) 20 mg tab tablet Take 1 Tab by mouth daily. 90 Tab 3    lidocaine (LIDODERM) 5 % Apply patch to the affected area for 12 hours a day and remove for 12 hours a day. 30 Each 0    DULoxetine (CYMBALTA) 30 mg capsule TAKE 1 CAPSULE BY MOUTH ONCE DAILY AT BEDTIME FOR 7 DAYS, THEN INCREASE TO 2 ONCE DAILY AT BEDTIME THEREAFTER 60 Cap 2    lisinopril (PRINIVIL, ZESTRIL) 10 mg tablet TAKE 1 TABLET BY MOUTH ONCE DAILY 90 Tab 0    pantoprazole (PROTONIX) 40 mg tablet TAKE 1 TABLET BY MOUTH ONCE DAILY 90 Tab 0    carvedilol (COREG) 12.5 mg tablet TAKE 1 TABLET BY MOUTH TWICE DAILY WITH  MEALS 180 Tab 0    gabapentin (NEURONTIN) 100 mg capsule Take  by mouth three (3) times daily.  polyethylene glycol (MIRALAX) 17 gram packet Mix 1 packet (17g) into 4-8 ounces of beverage and drink once or twice daily. Results in 2-4 days, Max treatment length of 2 weeks.  dicyclomine (BENTYL) 10 mg capsule Take 1 Cap by mouth four (4) times daily as needed.  [address] ferrous sulfate (IRON) 325 mg (65 mg iron) tablet Take 325 mg by mouth Daily (before breakfast). Indications: Iron Deficiency Anemia       No current facility-administered medications on file prior to visit. Allergies   Allergen Reactions    Percocet [Oxycodone-Acetaminophen] Rash and Itching     Can Take Percocet but must take Benadryl for itching     Perfume Ht52 Rash     Perfume specific:  MUSK    Pravastatin Itching     Not sure, pt denies       Objective:  Visit Vitals  /89 (BP 1 Location: Right arm, BP Patient Position: Sitting)   Pulse (!) 110   Temp 98.4 °F (36.9 °C)   Resp 16   Ht 5' (1.524 m)   Wt 165 lb (74.8 kg)   SpO2 90%   BMI 32.22 kg/m²      Constitutional: Well developed, nourished, no distress, alert, obese habitus   CV: S1, S2.  RRR. No murmurs/rubs. No edema. Pulm: No abnormalities on inspection. Clear to auscultation bilaterally. No wheezing/rhonchi. Normal effort. Neuro: A/O x year, city, self. Speech dysarthric. Psych: Appropriate blunted, poor judgement and insight. Short-term memory impaired      Labwork and Ancillary Studies:    CBC w/Diff  Lab Results   Component Value Date/Time    WBC 7.8 07/30/2019 02:15 PM    HGB 13.8 07/30/2019 02:15 PM    PLATELET 749 67/89/9218 02:15 PM         Basic Metabolic Profile/LFTs  Lab Results   Component Value Date/Time    Sodium 132 (L) 07/30/2019 02:15 PM    Potassium 4.0 07/30/2019 02:15 PM    Chloride 100 07/30/2019 02:15 PM    CO2 23 07/30/2019 02:15 PM    Anion gap 9 07/30/2019 02:15 PM    Glucose 566 (HH) 07/30/2019 02:15 PM    BUN 30 (H) 07/30/2019 02:15 PM    Creatinine 1.32 (H) 07/30/2019 02:15 PM    BUN/Creatinine ratio 23 (H) 07/30/2019 02:15 PM    GFR est AA 49 (L) 07/30/2019 02:15 PM    GFR est non-AA 40 (L) 07/30/2019 02:15 PM    Calcium 9.1 07/30/2019 02:15 PM      Lab Results   Component Value Date/Time    ALT (SGPT) 26 07/30/2019 02:15 PM    AST (SGOT) 17 07/30/2019 02:15 PM    Alk.  phosphatase 143 (H) 07/30/2019 02:15 PM    Bilirubin, direct <0.1 04/28/2016 03:42 AM    Bilirubin, total 0.3 07/30/2019 02:15 PM       Cholesterol  Lab Results   Component Value Date/Time    Cholesterol, total 169 02/28/2018 12:00 AM    HDL Cholesterol 42 02/28/2018 12:00 AM    LDL, calculated 86 02/28/2018 12:00 AM    Triglyceride 207 (H) 02/28/2018 12:00 AM    CHOL/HDL Ratio 3.7 02/19/2017 05:01 AM

## 2019-10-25 NOTE — PROGRESS NOTES
Zan Branch is a 77 y.o. female (: 1950) presenting to address:    Chief Complaint   Patient presents with    Diabetes     Follow up       Vitals:    10/25/19 1454   BP: 141/89   Pulse: (!) 110   Resp: 16   Temp: 98.4 °F (36.9 °C)   SpO2: 90%   Weight: 165 lb (74.8 kg)   Height: 5' (1.524 m)       Hearing/Vision:   No exam data present    Learning Assessment:     Learning Assessment 10/22/2015   PRIMARY LEARNER Patient   HIGHEST LEVEL OF EDUCATION - PRIMARY LEARNER  -   BARRIERS PRIMARY LEARNER -   CO-LEARNER CAREGIVER -   PRIMARY LANGUAGE ENGLISH   LEARNER PREFERENCE PRIMARY READING   ANSWERED BY pt   RELATIONSHIP SELF     Depression Screening:     3 most recent PHQ Screens 2019   PHQ Not Done -   Little interest or pleasure in doing things Not at all   Feeling down, depressed, irritable, or hopeless Several days   Total Score PHQ 2 1   Trouble falling or staying asleep, or sleeping too much -   Feeling tired or having little energy -   Poor appetite, weight loss, or overeating -   Feeling bad about yourself - or that you are a failure or have let yourself or your family down -   Trouble concentrating on things such as school, work, reading, or watching TV -   Moving or speaking so slowly that other people could have noticed; or the opposite being so fidgety that others notice -   Thoughts of being better off dead, or hurting yourself in some way -   PHQ 9 Score -   How difficult have these problems made it for you to do your work, take care of your home and get along with others -     Fall Risk Assessment:     Fall Risk Assessment, last 12 mths 10/25/2019   Able to walk? Yes   Fall in past 12 months? No   Fall with injury? -   Number of falls in past 12 months -   Fall Risk Score -     Abuse Screening:     Abuse Screening Questionnaire 2018   Do you ever feel afraid of your partner? N   Are you in a relationship with someone who physically or mentally threatens you?  N   Is it safe for you to go home? Y     Coordination of Care Questionaire:   1. Have you been to the ER, urgent care clinic since your last visit? Hospitalized since your last visit? NO    2. Have you seen or consulted any other health care providers outside of the [address] [place] [address] since your last visit? Include any pap smears or colon screening. NO    Advanced Directive:   1. Do you have an Advanced Directive? NO    2. Would you like information on Advanced Directives?  NO

## 2019-11-25 NOTE — PROGRESS NOTES
I have personally seen and evaluated the device findings. Interrogation reviewed and I agree with assessment.     Mark Charlton

## 2019-12-11 ENCOUNTER — OFFICE VISIT (OUTPATIENT)
Dept: FAMILY MEDICINE CLINIC | Age: 69
End: 2019-12-11

## 2019-12-11 VITALS
BODY MASS INDEX: 33.18 KG/M2 | OXYGEN SATURATION: 94 % | TEMPERATURE: 97.8 F | HEART RATE: 71 BPM | SYSTOLIC BLOOD PRESSURE: 123 MMHG | HEIGHT: 60 IN | DIASTOLIC BLOOD PRESSURE: 77 MMHG | RESPIRATION RATE: 16 BRPM | WEIGHT: 169 LBS

## 2019-12-11 DIAGNOSIS — Z79.4 TYPE 2 DIABETES MELLITUS WITH OTHER NEUROLOGIC COMPLICATION, WITH LONG-TERM CURRENT USE OF INSULIN (HCC): Primary | ICD-10-CM

## 2019-12-11 DIAGNOSIS — E11.49 TYPE 2 DIABETES MELLITUS WITH OTHER NEUROLOGIC COMPLICATION, WITH LONG-TERM CURRENT USE OF INSULIN (HCC): Primary | ICD-10-CM

## 2019-12-11 DIAGNOSIS — N18.30 STAGE 3 CHRONIC KIDNEY DISEASE (HCC): ICD-10-CM

## 2019-12-11 LAB
ALBUMIN UR QL STRIP: 80 MG/L
CREATININE, URINE POC: 50 MG/DL
HBA1C MFR BLD HPLC: 12.6 %
MICROALBUMIN/CREAT RATIO POC: NORMAL MG/G

## 2019-12-11 RX ORDER — DOXYLAMINE SUCCINATE 25 MG
TABLET ORAL 2 TIMES DAILY
Qty: 57 G | Refills: 0 | Status: SHIPPED | OUTPATIENT
Start: 2019-12-11

## 2019-12-11 NOTE — PROGRESS NOTES
Assessment/Plan:    1. Type 2 diabetes mellitus with other neurologic complication, with long-term current use of insulin ([facility])  -switch to 70/30 as planned (now that old supply of insulin is used). F/u in 1mo. - AMB POC HEMOGLOBIN A1C  - AMB POC URINE, MICROALBUMIN, SEMIQUANT (3 RESULTS)  - LIPID PANEL W/ REFLX DIRECT LDL; Future  - METABOLIC PANEL, COMPREHENSIVE; Future    2. Stage 3 chronic kidney disease ([facility]  -labs prior to next visit. - METABOLIC PANEL, COMPREHENSIVE; Future      The plan was discussed with the patient. The patient verbalized understanding and is in agreement with the plan. All medication potential side effects were discussed with the patient. Health Maintenance:   Health Maintenance   Topic Date Due    MICROALBUMIN Q1  02/28/2019    LIPID PANEL Q1  02/28/2019    MEDICARE YEARLY EXAM  08/09/2019    EYE EXAM RETINAL OR DILATED  10/05/2019    Shingrix Vaccine Age 50> (1 of 2) 09/30/2020 (Originally 4/7/2001)    HEMOGLOBIN A1C Q6M  03/16/2020    FOOT EXAM Q1  09/16/2020    GLAUCOMA SCREENING Q2Y  04/17/2021    BREAST CANCER SCRN MAMMOGRAM  05/20/2021    COLONOSCOPY  04/17/2024    DTaP/Tdap/Td series (2 - Td) 07/26/2026    Hepatitis C Screening  Completed    Bone Densitometry (Dexa) Screening  Completed    Influenza Age 5 to Adult  Completed    Pneumococcal 65+ years  Completed       Claire Sprague is a 77 y.o. female and presents with Diabetes (follow up)     Subjective:  dm2 - pt was changed to 70/30 instead of basal/bolus due to dementia.  was supposed to help dose meds. However, they are still doing the old insulin (80 units lantus + 25 units of novolog in am only). They bring in one worth of bs - bs in later half are much better when she eliminated grapes. A1c is 12.6. ROS:  Constitutional: No recent weight change. No weakness/fatigue. No f/c. Cardiovascular: No CP/palpitations. No NINA/orthopnea/PND.    Respiratory: No cough/sputum, dyspnea, wheezing. Gastointestinal: No dysphagia, reflux. No n/v. No constipation/diarrhea. No melena/rectal bleeding. The problem list was updated as a part of today's visit. Patient Active Problem List   Diagnosis Code    Vitamin D deficiency E55.9    Hx of breast cancer L71.2    Diastolic CHF, chronic ([facility]) I50.32    GERD (gastroesophageal reflux disease) K21.9    Tobacco dependence F17.200    Chronic radicular lumbar pain M54.16, G89.29    Scoliosis M41.9    Essential hypertension I10    Pure hypercholesterolemia E78.00    CAD (coronary artery disease) I25.10    Spinal stenosis of lumbar region with neurogenic claudication M48.062    S/P lumbar fusion Z98.1    Osteoporosis M81.0    Hemianopsia H53.47    Reactive depression F32.9    Stenosis of left carotid artery I65.22    Carpal tunnel syndrome of right wrist G56.01    Type 2 diabetes mellitus with neurologic complication, with long-term current use of insulin ([facility]) E11.49, Z79.4    History of compression fracture of spine Z87.81    History of stroke Z86.73    Stage 3 chronic kidney disease ([facility]) N18.3    Diabetic nephropathy associated with type 2 diabetes mellitus ([facility]) E11.21    Full code status Z78.9    Functional diarrhea Q33.6    Eosinophilic enteritis F53.45    NSAID induced gastritis K29.60, T39.395A    Candida esophagitis ([facility]) B37.81    LBBB (left bundle branch block) I44.7    Vascular dementia without behavioral disturbance ([facility]) F01.50       The PSH, FH were reviewed.     SH:  Social History     Tobacco Use    Smoking status: Current Every Day Smoker     Packs/day: 0.50     Years: 35.00     Pack years: 17.50     Types: Cigarettes    Smokeless tobacco: Never Used   Substance Use Topics    Alcohol use: No    Drug use: No       Medications/Allergies:  Current Outpatient Medications on File Prior to Visit   Medication Sig Dispense Refill    insulin aspart protamine/insulin aspart (NOVOLOG MIX 70/30) 100 unit/mL (70-30) inpn 25 Units by SubCUTAneous route two (2) times a day. 15 Adjustable Dose Pre-filled Pen Syringe 0    lisinopril (PRINIVIL, ZESTRIL) 10 mg tablet TAKE 1 TABLET BY MOUTH ONCE DAILY at dinner 90 Tab 0    pantoprazole (PROTONIX) 40 mg tablet TAKE 1 TABLET BY MOUTH ONCE DAILY at dinner 90 Tab 3    carvedilol (COREG) 12.5 mg tablet TAKE 1 TABLET BY MOUTH TWICE DAILY WITH  MEALS 180 Tab 0    rivaroxaban (XARELTO) 20 mg tab tablet Take 1 Tab by mouth daily (with dinner). 90 Tab 3    atorvastatin (LIPITOR) 40 mg tablet Take 1 Tab by mouth daily (with dinner). 90 Tab 3    DULoxetine (CYMBALTA) 30 mg capsule Take 1 Cap by mouth daily (with dinner). 90 Cap 2    Insulin Syringe-Needle U-100 (BD INSULIN SYRINGE ULTRA-FINE) 0.3 mL 30 gauge x 1/2\" syrg Use with insulin qid. e11.65 400 Syringe 3    [DISCONTINUED] ferrous sulfate (IRON) 325 mg (65 mg iron) tablet Take 325 mg by mouth Daily (before breakfast). Indications: Iron Deficiency Anemia       No current facility-administered medications on file prior to visit. Allergies   Allergen Reactions    Percocet [Oxycodone-Acetaminophen] Rash and Itching     Can Take Percocet but must take Benadryl for itching     Perfume Ht52 Rash     Perfume specific:  MUSK    Pravastatin Itching     Not sure, pt denies       Objective:  Visit Vitals  /77 (BP 1 Location: Right arm, BP Patient Position: Sitting)   Pulse 71   Temp 97.8 °F (36.6 °C) (Oral)   Resp 16   Ht 5' (1.524 m)   Wt 169 lb (76.7 kg)   SpO2 94%   BMI 33.01 kg/m²      Constitutional: Well developed, nourished, no distress, alert, obese habitus, appears older than stated age   CV: S1, S2.  RRR. No murmurs/rubs. No edema. Pulm: No abnormalities on inspection. Clear to auscultation bilaterally. No wheezing/rhonchi. Normal effort. GI: Soft, nontender, nondistended. Normal active bowel sounds.

## 2019-12-11 NOTE — PROGRESS NOTES
Jose Wang is a 77 y.o. female (: 1950) presenting to address:    Chief Complaint   Patient presents with    Diabetes     follow up    Vaginal Itching     x 2 weeks       Vitals:    19 1448   BP: 123/77   Pulse: 71   Resp: 16   Temp: 97.8 °F (36.6 °C)   TempSrc: Oral   SpO2: 94%   Weight: 169 lb (76.7 kg)   Height: 5' (1.524 m)   PainSc:   0 - No pain       Hearing/Vision:   No exam data present    Learning Assessment:     Learning Assessment 10/22/2015   PRIMARY LEARNER Patient   HIGHEST LEVEL OF EDUCATION - PRIMARY LEARNER  -   BARRIERS PRIMARY LEARNER -   CO-LEARNER CAREGIVER -   PRIMARY LANGUAGE ENGLISH   LEARNER PREFERENCE PRIMARY READING   ANSWERED BY pt   RELATIONSHIP SELF     Depression Screening:     3 most recent PHQ Screens 2019   PHQ Not Done -   Little interest or pleasure in doing things Not at all   Feeling down, depressed, irritable, or hopeless Several days   Total Score PHQ 2 1   Trouble falling or staying asleep, or sleeping too much -   Feeling tired or having little energy -   Poor appetite, weight loss, or overeating -   Feeling bad about yourself - or that you are a failure or have let yourself or your family down -   Trouble concentrating on things such as school, work, reading, or watching TV -   Moving or speaking so slowly that other people could have noticed; or the opposite being so fidgety that others notice -   Thoughts of being better off dead, or hurting yourself in some way -   PHQ 9 Score -   How difficult have these problems made it for you to do your work, take care of your home and get along with others -     Fall Risk Assessment:     Fall Risk Assessment, last 12 mths 2019   Able to walk? Yes   Fall in past 12 months? No   Fall with injury? -   Number of falls in past 12 months -   Fall Risk Score -     Abuse Screening:     Abuse Screening Questionnaire 2018   Do you ever feel afraid of your partner?  N   Are you in a relationship with someone who physically or mentally threatens you? N   Is it safe for you to go home? Y     Coordination of Care Questionaire:   1. Have you been to the ER, urgent care clinic since your last visit? Hospitalized since your last visit? NO    2. Have you seen or consulted any other health care providers outside of the [address] [place] [address] since your last visit? Include any pap smears or colon screening. NO    Advanced Directive:   1. Do you have an Advanced Directive? NO    2. Would you like information on Advanced Directives?  NO

## 2020-02-18 PROBLEM — I50.22 CHRONIC SYSTOLIC CONGESTIVE HEART FAILURE (HCC): Status: ACTIVE | Noted: 2020-02-18

## 2020-02-20 RX ORDER — CALCIUM CITRATE/VITAMIN D3 200MG-6.25
TABLET ORAL
Qty: 200 STRIP | Refills: 0 | Status: SHIPPED | OUTPATIENT
Start: 2020-02-20

## 2020-02-24 PROBLEM — Z51.5 HOSPICE CARE: Status: ACTIVE | Noted: 2017-07-07

## 2020-02-25 ENCOUNTER — PATIENT OUTREACH (OUTPATIENT)
Dept: CASE MANAGEMENT | Age: 70
End: 2020-02-25

## 2021-11-03 NOTE — TELEPHONE ENCOUNTER
It appears Dr. Beau You has been prescribing her norco? Is she taking this? If she is out and needs a refill, may make an apt with an NP prior to the apt w/ Dr. Soo Lee. [FreeTextEntry1] : SPI performed in office show \par FEV1: 61% \par FEV1/FVC Ratio: 104% \par YBG30-25%: 82% \par \par \par

## 2024-10-14 NOTE — TELEPHONE ENCOUNTER
Patient called in requesting to know if she is supposed to drop off her CT cd from [facility] she was asked to go get for Dr. Fred Parada. She states she is having SX 02/19/18 but has no upcoming appt. Please advise patient at [phone]. n/a

## 2024-12-23 NOTE — PROCEDURES
Procedure Note    Patient Name: Dana Jacobs    Date of Procedure: December 12, 2017    Preoperative Diagnosis: Sacroiliitis    Post Operative Diagnosis: same    Procedure: SI Joint Intraarticular and Extra-articular Injection - Bilateral    Consent: Informed consent was obtained prior to the procedure. The patient was given the opportunity to ask questions regarding the procedure and its associated risks. In addition to the potential risks associated with the procedure itself, the patient was informed both verbally and in writing of potential side effects of the use of glucocorticoids. The patient appeared to comprehend the informed consent and desired to have the procedure performed. Procedure: The patient was placed in the prone position on the flouroscopy table and the back was prepped and draped in the usual sterile manner. After local Lidocaine !% infiltration, a #22 gauge spinal needle was then advanced to lie within the SI joint. The procedure was repeated for the contralateral SI joint. Yes a small amount of Isovue was used to confirm placement, no vascular uptake was identified. A total of 30 mg of Dexamethasone  and 5 ml of Lidocaine was introduced in and around the SI joint from two different needle placements. The injection area was cleaned and bandaids applied. No excessive bleeding was noted. Patient dressed and was discharged to home with instructions. Discussion:  (x) The patient tolerated the procedure well.     ( ) ________________________________________________      [address], [place]  December 12, 2017 Duration Of Freeze Thaw-Cycle (Seconds): 0 Post-Care Instructions: I reviewed with the patient in detail post-care instructions. Patient is to wear sunprotection, and avoid picking at any of the treated lesions. Pt may apply Vaseline to crusted or scabbing areas. Render Post-Care Instructions In Note?: no Show Aperture Variable?: Yes Detail Level: Zone Consent: The patient's consent was obtained including but not limited to risks of crusting, scabbing, blistering, scarring, darker or lighter pigmentary change, recurrence, incomplete removal and infection. Yes

## (undated) DEVICE — GOWN,PREVENTION PLUS,XL,ST,24/CS: Brand: MEDLINE

## (undated) DEVICE — INTENDED FOR TISSUE SEPARATION, AND OTHER PROCEDURES THAT REQUIRE A SHARP SURGICAL BLADE TO PUNCTURE OR CUT.: Brand: BARD-PARKER ®  SAFETY SCALPED

## (undated) DEVICE — TABLE COVER: Brand: CONVERTORS

## (undated) DEVICE — FLEX ADVANTAGE 1500CC: Brand: FLEX ADVANTAGE

## (undated) DEVICE — 3M™ STERI-STRIP™ REINFORCED ADHESIVE SKIN CLOSURES, R1541, 1/4 IN X 3 IN (6 MM X 75 MM), 3 STRIPS/ENVELOPE: Brand: 3M™ STERI-STRIP™

## (undated) DEVICE — SUTURE MCRYL SZ 4-0 L18IN ABSRB UD L19MM PS-2 3/8 CIR PRIM Y496G

## (undated) DEVICE — GAUZE SPONGES,12 PLY: Brand: CURITY

## (undated) DEVICE — BITE BLK ENDOSCP AD 54FR GRN POLYETH ENDOSCP W STRP SLD

## (undated) DEVICE — 3M™ TEGADERM™ TRANSPARENT FILM DRESSING FRAME STYLE, 1626W, 4 IN X 4-3/4 IN (10 CM X 12 CM), 50/CT 4CT/CASE: Brand: 3M™ TEGADERM™

## (undated) DEVICE — MEDI-VAC NON-CONDUCTIVE SUCTION TUBING: Brand: CARDINAL HEALTH

## (undated) DEVICE — SYR 10ML CTRL LR LCK NSAF LF --

## (undated) DEVICE — KENDALL 500 SERIES DIAPHORETIC FOAM MONITORING ELECTRODE - TEAR DROP SHAPE ( 30/PK): Brand: KENDALL

## (undated) DEVICE — GOWN,AURORA,NON-REINFORCED,2XL: Brand: MEDLINE

## (undated) DEVICE — NDL PRT INJ NSAF BLNT 18GX1.5 --

## (undated) DEVICE — SYR 50ML SLIP TIP NSAF LF STRL --

## (undated) DEVICE — PREP CHLORAPREP 10.5 ML ORG --

## (undated) DEVICE — TRAY MYEL SFTY +

## (undated) DEVICE — NEEDLE HYPO 25GA L1.5IN BVL ORIENTED ECLIPSE

## (undated) DEVICE — COLONOSCOPE CYTOLOGY BRUSH: Brand: COOK

## (undated) DEVICE — (D)SYR 10ML 1/5ML GRAD NSAF -- PKGING CHANGE USE ITEM 338027

## (undated) DEVICE — DRAPE,ANGIO,BRACH,STERILE,38X44: Brand: MEDLINE

## (undated) DEVICE — COVADERM: Brand: DEROYAL

## (undated) DEVICE — KIT COLON W/ 1.1OZ LUB AND 2 END

## (undated) DEVICE — CONTAINER PREFIL FRMLN 15ML --

## (undated) DEVICE — MEDIA CONTRAST 10ML 200MG/ML 41%

## (undated) DEVICE — FORCEPS BX L240CM JAW DIA2.8MM L CAP W/ NDL MIC MESH TOOTH

## (undated) DEVICE — STERILE POLYISOPRENE POWDER-FREE SURGICAL GLOVES: Brand: PROTEXIS

## (undated) DEVICE — BASIN EMESIS 500CC ROSE 250/CS 60/PLT: Brand: MEDEGEN MEDICAL PRODUCTS, LLC

## (undated) DEVICE — 3M™ STERI-STRIP™ COMPOUND BENZOIN TINCTURE 40 BAGS/CARTON 4 CARTONS/CASE C1544: Brand: 3M™ STERI-STRIP™

## (undated) DEVICE — (D)BNDG ADHESIVE FABRIC 3/4X3 -- DISC BY MFR USE ITEM 357960

## (undated) DEVICE — DEVICE INFL 60ML 12ATM CONVENIENT LOK REL HNDL HI PRSS FLX